# Patient Record
Sex: FEMALE | Race: WHITE | NOT HISPANIC OR LATINO | Employment: FULL TIME | URBAN - METROPOLITAN AREA
[De-identification: names, ages, dates, MRNs, and addresses within clinical notes are randomized per-mention and may not be internally consistent; named-entity substitution may affect disease eponyms.]

---

## 2021-04-03 ENCOUNTER — APPOINTMENT (EMERGENCY)
Dept: CT IMAGING | Facility: HOSPITAL | Age: 56
DRG: 563 | End: 2021-04-03
Payer: COMMERCIAL

## 2021-04-03 ENCOUNTER — HOSPITAL ENCOUNTER (INPATIENT)
Facility: HOSPITAL | Age: 56
LOS: 2 days | DRG: 563 | End: 2021-04-07
Attending: EMERGENCY MEDICINE | Admitting: INTERNAL MEDICINE
Payer: COMMERCIAL

## 2021-04-03 ENCOUNTER — APPOINTMENT (EMERGENCY)
Dept: RADIOLOGY | Facility: HOSPITAL | Age: 56
DRG: 563 | End: 2021-04-03
Payer: COMMERCIAL

## 2021-04-03 DIAGNOSIS — S00.81XA FOREHEAD ABRASION: Primary | ICD-10-CM

## 2021-04-03 DIAGNOSIS — S06.9X9A HEAD INJURY WITH LOSS OF CONSCIOUSNESS (HCC): ICD-10-CM

## 2021-04-03 DIAGNOSIS — S63.502A WRIST SPRAIN, LEFT, INITIAL ENCOUNTER: ICD-10-CM

## 2021-04-03 DIAGNOSIS — S80.211A KNEE ABRASION, RIGHT, INITIAL ENCOUNTER: ICD-10-CM

## 2021-04-03 DIAGNOSIS — R26.2 AMBULATORY DYSFUNCTION: ICD-10-CM

## 2021-04-03 DIAGNOSIS — S82.141A CLOSED FRACTURE OF RIGHT TIBIAL PLATEAU, INITIAL ENCOUNTER: ICD-10-CM

## 2021-04-03 DIAGNOSIS — M25.561 RIGHT KNEE PAIN: ICD-10-CM

## 2021-04-03 PROBLEM — S63.509A WRIST SPRAIN: Status: ACTIVE | Noted: 2021-04-03

## 2021-04-03 PROBLEM — K21.9 GERD (GASTROESOPHAGEAL REFLUX DISEASE): Status: ACTIVE | Noted: 2021-04-03

## 2021-04-03 PROBLEM — E03.9 HYPOTHYROIDISM: Status: ACTIVE | Noted: 2021-04-03

## 2021-04-03 PROBLEM — S82.143A TIBIAL PLATEAU FRACTURE: Status: ACTIVE | Noted: 2021-04-03

## 2021-04-03 PROBLEM — E66.9 OBESITY: Status: ACTIVE | Noted: 2021-04-03

## 2021-04-03 LAB
ANION GAP SERPL CALCULATED.3IONS-SCNC: 10 MMOL/L (ref 4–13)
BASOPHILS # BLD AUTO: 0.04 THOUSANDS/ΜL (ref 0–0.1)
BASOPHILS NFR BLD AUTO: 1 % (ref 0–1)
BUN SERPL-MCNC: 14 MG/DL (ref 5–25)
CALCIUM SERPL-MCNC: 9.8 MG/DL (ref 8.3–10.1)
CHLORIDE SERPL-SCNC: 105 MMOL/L (ref 100–108)
CO2 SERPL-SCNC: 26 MMOL/L (ref 21–32)
CREAT SERPL-MCNC: 0.74 MG/DL (ref 0.6–1.3)
EOSINOPHIL # BLD AUTO: 0.03 THOUSAND/ΜL (ref 0–0.61)
EOSINOPHIL NFR BLD AUTO: 0 % (ref 0–6)
ERYTHROCYTE [DISTWIDTH] IN BLOOD BY AUTOMATED COUNT: 13 % (ref 11.6–15.1)
GFR SERPL CREATININE-BSD FRML MDRD: 91 ML/MIN/1.73SQ M
GLUCOSE P FAST SERPL-MCNC: 112 MG/DL (ref 65–99)
GLUCOSE SERPL-MCNC: 112 MG/DL (ref 65–140)
HCT VFR BLD AUTO: 37.8 % (ref 34.8–46.1)
HGB BLD-MCNC: 12.5 G/DL (ref 11.5–15.4)
IMM GRANULOCYTES # BLD AUTO: 0.03 THOUSAND/UL (ref 0–0.2)
IMM GRANULOCYTES NFR BLD AUTO: 0 % (ref 0–2)
LYMPHOCYTES # BLD AUTO: 1.71 THOUSANDS/ΜL (ref 0.6–4.47)
LYMPHOCYTES NFR BLD AUTO: 22 % (ref 14–44)
MCH RBC QN AUTO: 26.3 PG (ref 26.8–34.3)
MCHC RBC AUTO-ENTMCNC: 33.1 G/DL (ref 31.4–37.4)
MCV RBC AUTO: 79 FL (ref 82–98)
MONOCYTES # BLD AUTO: 0.56 THOUSAND/ΜL (ref 0.17–1.22)
MONOCYTES NFR BLD AUTO: 7 % (ref 4–12)
NEUTROPHILS # BLD AUTO: 5.39 THOUSANDS/ΜL (ref 1.85–7.62)
NEUTS SEG NFR BLD AUTO: 70 % (ref 43–75)
NRBC BLD AUTO-RTO: 0 /100 WBCS
PLATELET # BLD AUTO: 165 THOUSANDS/UL (ref 149–390)
PMV BLD AUTO: 11.9 FL (ref 8.9–12.7)
POTASSIUM SERPL-SCNC: 4 MMOL/L (ref 3.5–5.3)
RBC # BLD AUTO: 4.76 MILLION/UL (ref 3.81–5.12)
SODIUM SERPL-SCNC: 141 MMOL/L (ref 136–145)
WBC # BLD AUTO: 7.76 THOUSAND/UL (ref 4.31–10.16)

## 2021-04-03 PROCEDURE — 73700 CT LOWER EXTREMITY W/O DYE: CPT

## 2021-04-03 PROCEDURE — 73564 X-RAY EXAM KNEE 4 OR MORE: CPT

## 2021-04-03 PROCEDURE — 99285 EMERGENCY DEPT VISIT HI MDM: CPT

## 2021-04-03 PROCEDURE — 80048 BASIC METABOLIC PNL TOTAL CA: CPT | Performed by: EMERGENCY MEDICINE

## 2021-04-03 PROCEDURE — 99284 EMERGENCY DEPT VISIT MOD MDM: CPT | Performed by: EMERGENCY MEDICINE

## 2021-04-03 PROCEDURE — 73110 X-RAY EXAM OF WRIST: CPT

## 2021-04-03 PROCEDURE — 85025 COMPLETE CBC W/AUTO DIFF WBC: CPT | Performed by: EMERGENCY MEDICINE

## 2021-04-03 PROCEDURE — 70450 CT HEAD/BRAIN W/O DYE: CPT

## 2021-04-03 PROCEDURE — 99220 PR INITIAL OBSERVATION CARE/DAY 70 MINUTES: CPT | Performed by: INTERNAL MEDICINE

## 2021-04-03 PROCEDURE — 36415 COLL VENOUS BLD VENIPUNCTURE: CPT | Performed by: EMERGENCY MEDICINE

## 2021-04-03 RX ORDER — MAGNESIUM HYDROXIDE/ALUMINUM HYDROXICE/SIMETHICONE 120; 1200; 1200 MG/30ML; MG/30ML; MG/30ML
30 SUSPENSION ORAL EVERY 6 HOURS PRN
Status: DISCONTINUED | OUTPATIENT
Start: 2021-04-03 | End: 2021-04-07 | Stop reason: HOSPADM

## 2021-04-03 RX ORDER — ACETAMINOPHEN 325 MG/1
650 TABLET ORAL EVERY 6 HOURS PRN
Status: DISCONTINUED | OUTPATIENT
Start: 2021-04-03 | End: 2021-04-07 | Stop reason: HOSPADM

## 2021-04-03 RX ORDER — DOCUSATE SODIUM 100 MG/1
100 CAPSULE, LIQUID FILLED ORAL 2 TIMES DAILY
Status: DISCONTINUED | OUTPATIENT
Start: 2021-04-03 | End: 2021-04-07 | Stop reason: HOSPADM

## 2021-04-03 RX ORDER — PANTOPRAZOLE SODIUM 20 MG/1
20 TABLET, DELAYED RELEASE ORAL
Status: DISCONTINUED | OUTPATIENT
Start: 2021-04-04 | End: 2021-04-07 | Stop reason: HOSPADM

## 2021-04-03 RX ORDER — SIMETHICONE 80 MG
80 TABLET,CHEWABLE ORAL 4 TIMES DAILY PRN
Status: DISCONTINUED | OUTPATIENT
Start: 2021-04-03 | End: 2021-04-07 | Stop reason: HOSPADM

## 2021-04-03 RX ORDER — NAPROXEN 500 MG/1
500 TABLET ORAL 2 TIMES DAILY WITH MEALS
Qty: 14 TABLET | Refills: 0 | Status: SHIPPED | OUTPATIENT
Start: 2021-04-03 | End: 2021-04-17 | Stop reason: HOSPADM

## 2021-04-03 RX ORDER — ONDANSETRON 2 MG/ML
4 INJECTION INTRAMUSCULAR; INTRAVENOUS EVERY 6 HOURS PRN
Status: DISCONTINUED | OUTPATIENT
Start: 2021-04-03 | End: 2021-04-07 | Stop reason: HOSPADM

## 2021-04-03 RX ORDER — CALCIUM CARBONATE 200(500)MG
1000 TABLET,CHEWABLE ORAL DAILY PRN
Status: DISCONTINUED | OUTPATIENT
Start: 2021-04-03 | End: 2021-04-07 | Stop reason: HOSPADM

## 2021-04-03 RX ORDER — ACETAMINOPHEN 325 MG/1
650 TABLET ORAL ONCE
Status: COMPLETED | OUTPATIENT
Start: 2021-04-03 | End: 2021-04-03

## 2021-04-03 RX ORDER — OXYCODONE HYDROCHLORIDE 5 MG/1
5 TABLET ORAL EVERY 4 HOURS PRN
Status: DISCONTINUED | OUTPATIENT
Start: 2021-04-03 | End: 2021-04-07 | Stop reason: HOSPADM

## 2021-04-03 RX ORDER — CYCLOBENZAPRINE HCL 10 MG
5 TABLET ORAL 3 TIMES DAILY PRN
Status: DISCONTINUED | OUTPATIENT
Start: 2021-04-03 | End: 2021-04-07 | Stop reason: HOSPADM

## 2021-04-03 RX ORDER — HYDROMORPHONE HCL/PF 1 MG/ML
0.2 SYRINGE (ML) INJECTION EVERY 4 HOURS PRN
Status: DISCONTINUED | OUTPATIENT
Start: 2021-04-03 | End: 2021-04-06

## 2021-04-03 RX ORDER — OXYCODONE HYDROCHLORIDE 5 MG/1
2.5 TABLET ORAL EVERY 4 HOURS PRN
Status: DISCONTINUED | OUTPATIENT
Start: 2021-04-03 | End: 2021-04-07 | Stop reason: HOSPADM

## 2021-04-03 RX ORDER — OMEPRAZOLE 20 MG/1
20 CAPSULE, DELAYED RELEASE ORAL DAILY
COMMUNITY
Start: 2021-01-29

## 2021-04-03 RX ADMIN — ACETAMINOPHEN 650 MG: 325 TABLET, FILM COATED ORAL at 18:08

## 2021-04-03 RX ADMIN — OXYCODONE HYDROCHLORIDE 5 MG: 5 TABLET ORAL at 22:22

## 2021-04-03 RX ADMIN — DOCUSATE SODIUM 100 MG: 100 CAPSULE, LIQUID FILLED ORAL at 22:22

## 2021-04-03 NOTE — ED CARE HANDOFF
Emergency Department Sign Out Note        Sign out and transfer of care from Dr Krystin Plasencia  See Separate Emergency Department note  The patient, Qamar Hurt, was evaluated by the previous provider for Mechanical fall, head, left wrist and right knee injury  Workup Completed:  XRs, Right Knee CT  ED Course / Workup Pending (followup):                                    ED Course as of Apr 03 2130   Sat Apr 03, 2021 2006 Patient received in sign-out from Dr Krystin Plasencia for follow of CT results, that shows Comminuted, nondisplaced tibial plateau fracture  2007 Patient evaluated at bedside, informed about the CT findings, will put knee immobilizer for right knee  Patient also complains of left wrist pain, mild swelling noted, x-rays reviewed, no clearly displaced fracture noted, however  subtle fracture of right lower radius possible, will put sugar-tong splint for left wrist/forearm  Patient is up-to-date with her tetanus, states that she got it at [facility] last year after accident  Will try ambulation with walker after knee immobilizer and wrist splint  2038 Case was discussed with Dr Nael Fraga, agree with Knee immobilizer, NWB, likely non-op; also possible fracture lower end of radius, agree with splinting; d/c from ortho standpoint, follow up in Office      2112 Patient not able get up or walk, lives by herself, we will admit for ambulatory dysfunction          Procedures  MDM    Disposition  Final diagnoses:   Forehead abrasion   Knee abrasion, right, initial encounter   Head injury with loss of consciousness ([facility] [address] )   Wrist sprain, left, initial encounter   Right knee pain   Closed fracture of right tibial plateau, initial encounter   Ambulatory dysfunction     Time reflects when diagnosis was documented in both MDM as applicable and the Disposition within this note     Time User Action Codes Description Comment    4/3/2021  5:49 PM Sylvia Torrez Add [S00 81XA] Forehead abrasion 4/3/2021  5:49 PM Sylvia Torrez Add [S80 211A] Knee abrasion, right, initial encounter     4/3/2021  6:11 PM Ibrahima Torrez 48 [Z52 1A4N] Head injury with loss of consciousness ([facility] [address] )     4/3/2021  6:51 PM Ibrahima Torrez 48 [U90 863R] Wrist sprain, left, initial encounter     4/3/2021  6:51 PM Shan, [address] Right knee pain     4/3/2021  8:50 PM Kelsie Vega Add [S07 080C] Closed fracture of right tibial plateau, initial encounter     4/3/2021  9:14 PM Kelsie Vega Add [R26 2] Ambulatory dysfunction       ED Disposition     ED Disposition Condition Date/Time Comment    Admit Stable Sat Apr 3, 2021  9:13 PM Case was discussed with Dr Aurelio Purvis and the patient's admission status was agreed to be Admission Status: observation status to the service of Dr Aurelio Purvis  Follow-up Information    None       Patient's Medications   Discharge Prescriptions    NAPROXEN (NAPROSYN) 500 MG TABLET    Take 1 tablet (500 mg total) by mouth 2 (two) times a day with meals       Start Date: 4/3/2021  End Date: --       Order Dose: 500 mg       Quantity: 14 tablet    Refills: 0     No discharge procedures on file         ED Provider  Electronically Signed by     Chris Delgadillo MD  04/03/21 1715

## 2021-04-03 NOTE — ED PROVIDER NOTES
History  Chief Complaint   Patient presents with    Head Injury w/LOC     patient with mechanical fall forward landing on face and hands  left wrist pain and forhead abrasion  patient states was out for approx a minute and once she woke up was "in a daze" patient currently awake and alert O x4     63 y o  F p/w fall  Pt states she missed a step and fell forward  Struck head  Reports LOC  Also having left wrist and right knee pain  Abrasion to forehead and right knee  Reports UTD w/ Tetanus vaccination  Denies changes in vision, CP, abd pain, N/V, focal deficits, numbness/tingling, anticoagulant use  History provided by:  Patient   used: No    Fall  Mechanism of injury: fall    Injury location:  Head/neck, shoulder/arm and leg  Head/neck injury location:  Head  Shoulder/arm injury location:  L wrist  Leg injury location:  R knee  Arrived directly from scene: yes    Fall:     Fall occurred:  Standing  Prior to arrival data:     Loss of consciousness: yes      Amnesic to event: no      Immobilization:  None  Associated symptoms: headaches and loss of consciousness    Associated symptoms: no abdominal pain, no chest pain, no nausea, no neck pain and no vomiting    Risk factors: no anticoagulation therapy        Prior to Admission Medications   Prescriptions Last Dose Informant Patient Reported? Taking? cyclobenzaprine (FLEXERIL) 5 mg tablet   No No   Sig: Take 1 tablet by mouth 3 (three) times a day as needed for muscle spasms   levothyroxine 200 mcg tablet   Yes Yes   Sig: Take 200 mcg by mouth daily   omeprazole (PriLOSEC) 20 mg delayed release capsule   Yes Yes   Sig: Take 20 mg by mouth daily      Facility-Administered Medications: None       Past Medical History:   Diagnosis Date    Disease of thyroid gland        Past Surgical History:   Procedure Laterality Date    GASTRIC BYPASS      HERNIA REPAIR         History reviewed  No pertinent family history    I have reviewed and agree with the history as documented  E-Cigarette/Vaping     E-Cigarette/Vaping Substances     Social History     Tobacco Use    Smoking status: Never Smoker    Smokeless tobacco: Never Used   Substance Use Topics    Alcohol use: No    Drug use: No       Review of Systems   Cardiovascular: Negative for chest pain  Gastrointestinal: Negative for abdominal pain, nausea and vomiting  Musculoskeletal: Negative for neck pain  Left wrist pain, right knee pain   Skin: Positive for wound (Abrasion to forehead and right knee)  Neurological: Positive for loss of consciousness and headaches  All other systems reviewed and are negative  Physical Exam  Physical Exam  Vitals signs and nursing note reviewed  Constitutional:       General: She is not in acute distress  Appearance: Normal appearance  She is well-developed  She is not ill-appearing, toxic-appearing or diaphoretic  HENT:      Head: Normocephalic and atraumatic  No raccoon eyes, Ruiz's sign, abrasion, contusion or laceration  Right Ear: Tympanic membrane and external ear normal  No laceration or drainage  No hemotympanum  Left Ear: Tympanic membrane and external ear normal  No laceration or drainage  No hemotympanum  Nose: Nose normal    Eyes:      General:         Right eye: No discharge  Left eye: No discharge  Extraocular Movements: Extraocular movements intact  Conjunctiva/sclera:      Right eye: No hemorrhage  Left eye: No hemorrhage  Pupils: Pupils are equal, round, and reactive to light  Neck:      Musculoskeletal: Full passive range of motion without pain and normal range of motion  Normal range of motion  No spinous process tenderness or muscular tenderness  Vascular: No JVD  Trachea: Trachea normal  No tracheal tenderness or tracheal deviation  Cardiovascular:      Rate and Rhythm: Normal rate and regular rhythm  Heart sounds: Normal heart sounds  No murmur   No friction rub  Pulmonary:      Effort: Pulmonary effort is normal  No accessory muscle usage, respiratory distress or retractions  Breath sounds: Normal breath sounds  No stridor  No decreased breath sounds, wheezing, rhonchi or rales  Chest:      Chest wall: No tenderness  Abdominal:      General: There is no distension  Palpations: Abdomen is soft  Abdomen is not rigid  There is no mass  Tenderness: There is no abdominal tenderness  There is no guarding or rebound  Musculoskeletal: Normal range of motion  General: No deformity  Left wrist: She exhibits tenderness  She exhibits no swelling, no crepitus and no deformity  Right knee: She exhibits no ecchymosis and no deformity  Tenderness (generalized) found  Cervical back: She exhibits no bony tenderness  Thoracic back: She exhibits no bony tenderness  Lumbar back: She exhibits no bony tenderness  Skin:     General: Skin is warm and dry  Coloration: Skin is not pale  Findings: Abrasion (Forehead, right knee) present  No bruising, ecchymosis or laceration  Neurological:      Mental Status: She is alert  GCS: GCS eye subscore is 4  GCS verbal subscore is 5  GCS motor subscore is 6  Cranial Nerves: No cranial nerve deficit  Sensory: No sensory deficit  Motor: Motor function is intact  Psychiatric:         Behavior: Behavior normal  Behavior is cooperative           Vital Signs  ED Triage Vitals   Temperature Pulse Respirations Blood Pressure SpO2   04/03/21 1811 04/03/21 1739 04/03/21 1739 04/03/21 1739 04/03/21 1739   98 5 °F (36 9 °C) 72 18 153/67 100 %      Temp Source Heart Rate Source Patient Position - Orthostatic VS BP Location FiO2 (%)   04/03/21 1811 04/03/21 1739 -- -- --   Oral Monitor         Pain Score       04/03/21 1808       Worst Possible Pain           Vitals:    04/03/21 1739   BP: 153/67   Pulse: 72         Visual Acuity  Visual Acuity      Most Recent Value R Pupil Size (mm)  3          ED Medications  Medications   acetaminophen (TYLENOL) tablet 650 mg (650 mg Oral Given 4/3/21 1808)       Diagnostic Studies  Results Reviewed     None                 CT head without contrast   Final Result by Khris Johns MD (04/03 1823)      No acute intracranial abnormality  Workstation performed: [id number]         XR knee 4+ vw right injury    (Results Pending)   XR wrist 3+ views LEFT    (Results Pending)   CT lower extremity wo contrast right    (Results Pending)              Procedures  Procedures         ED Course  ED Course as of Apr 03 1856   Sat Apr 03, 2021 1808 Pt given Tylenol  1850 Questionable tibial plateau fx vs nutrient channel on knee xray  Will order CT scan to r/o fx  Xray wrist without obvious fx    Pt updated on CT head and xray wrist                                               MDM  Number of Diagnoses or Management Options     Amount and/or Complexity of Data Reviewed  Tests in the radiology section of CPT®: ordered and reviewed        Disposition  Final diagnoses:   Forehead abrasion   Knee abrasion, right, initial encounter   Head injury with loss of consciousness ([address] )   Wrist sprain, left, initial encounter   Right knee pain     Time reflects when diagnosis was documented in both MDM as applicable and the Disposition within this note     Time User Action Codes Description Comment    4/3/2021  5:49 PM Shan, [address] Forehead abrasion     4/3/2021  5:49 PM Shan, [address] Knee abrasion, right, initial encounter     4/3/2021  6:11 PM Mamadou Maxwell [L90 7Y2W] Head injury with loss of consciousness ([address] )     4/3/2021  6:51 PM Bri Remerton Wrist sprain, left, initial encounter     4/3/2021  6:51 PM Shan, [address] Right knee pain       ED Disposition     None      Follow-up Information    None         Patient's Medications   Discharge Prescriptions NAPROXEN (NAPROSYN) 500 MG TABLET    Take 1 tablet (500 mg total) by mouth 2 (two) times a day with meals       Start Date: 4/3/2021  End Date: --       Order Dose: 500 mg       Quantity: 14 tablet    Refills: 0     No discharge procedures on file      PDMP Review     None          ED Provider  Electronically Signed by           Cary Leary 24, DO  04/03/21 7558

## 2021-04-04 LAB
ANION GAP SERPL CALCULATED.3IONS-SCNC: 8 MMOL/L (ref 4–13)
BUN SERPL-MCNC: 11 MG/DL (ref 5–25)
CALCIUM SERPL-MCNC: 9 MG/DL (ref 8.3–10.1)
CHLORIDE SERPL-SCNC: 107 MMOL/L (ref 100–108)
CO2 SERPL-SCNC: 27 MMOL/L (ref 21–32)
CREAT SERPL-MCNC: 0.72 MG/DL (ref 0.6–1.3)
ERYTHROCYTE [DISTWIDTH] IN BLOOD BY AUTOMATED COUNT: 13.1 % (ref 11.6–15.1)
GFR SERPL CREATININE-BSD FRML MDRD: 94 ML/MIN/1.73SQ M
GLUCOSE SERPL-MCNC: 113 MG/DL (ref 65–140)
HCT VFR BLD AUTO: 33.9 % (ref 34.8–46.1)
HGB BLD-MCNC: 10.9 G/DL (ref 11.5–15.4)
MCH RBC QN AUTO: 25.8 PG (ref 26.8–34.3)
MCHC RBC AUTO-ENTMCNC: 32.2 G/DL (ref 31.4–37.4)
MCV RBC AUTO: 80 FL (ref 82–98)
PLATELET # BLD AUTO: 129 THOUSANDS/UL (ref 149–390)
PMV BLD AUTO: 11.3 FL (ref 8.9–12.7)
POTASSIUM SERPL-SCNC: 3.9 MMOL/L (ref 3.5–5.3)
RBC # BLD AUTO: 4.22 MILLION/UL (ref 3.81–5.12)
SODIUM SERPL-SCNC: 142 MMOL/L (ref 136–145)
WBC # BLD AUTO: 5.03 THOUSAND/UL (ref 4.31–10.16)

## 2021-04-04 PROCEDURE — 99244 OFF/OP CNSLTJ NEW/EST MOD 40: CPT | Performed by: PHYSICIAN ASSISTANT

## 2021-04-04 PROCEDURE — 85027 COMPLETE CBC AUTOMATED: CPT | Performed by: INTERNAL MEDICINE

## 2021-04-04 PROCEDURE — 99232 SBSQ HOSP IP/OBS MODERATE 35: CPT | Performed by: INTERNAL MEDICINE

## 2021-04-04 PROCEDURE — 80048 BASIC METABOLIC PNL TOTAL CA: CPT | Performed by: INTERNAL MEDICINE

## 2021-04-04 RX ADMIN — OXYCODONE HYDROCHLORIDE 5 MG: 5 TABLET ORAL at 05:14

## 2021-04-04 RX ADMIN — PANTOPRAZOLE SODIUM 20 MG: 20 TABLET, DELAYED RELEASE ORAL at 06:05

## 2021-04-04 RX ADMIN — ENOXAPARIN SODIUM 40 MG: 40 INJECTION SUBCUTANEOUS at 09:01

## 2021-04-04 RX ADMIN — OXYCODONE HYDROCHLORIDE 5 MG: 5 TABLET ORAL at 17:11

## 2021-04-04 RX ADMIN — LEVOTHYROXINE SODIUM 175 MCG: 100 TABLET ORAL at 06:05

## 2021-04-04 RX ADMIN — HYDROMORPHONE HYDROCHLORIDE 0.2 MG: 1 INJECTION, SOLUTION INTRAMUSCULAR; INTRAVENOUS; SUBCUTANEOUS at 21:08

## 2021-04-04 NOTE — OCCUPATIONAL THERAPY NOTE
Occupational Therapy Cancellation Note        Patient Name: Kulwant Fulton  OBZFU'C Date: 4/4/2021      OT consult received  Pt w/ R tibial plateau fracture after a fall, will await orthopedic consult prior to completing OT evaluation      Gary Tan MS, OTR/L

## 2021-04-04 NOTE — CONSULTS
Orthopedics   Ludin Marlena 63 y o  female MRN: [id number]  Unit/Bed#: E2 -01      Chief Complaint:   right knee pain, left wrist pain    HPI:   63 y o  female status post fall complaining of right knee pain and left wrist pain  Patient states she tripped and fell forward onto her right knee and left wrist and bumped her head on the curb  She currently notes severe right knee pain as well as left wrist pain  She does have a small abrasion on her forehead  Pain is made worse with motion or contact to the area and improves somewhat with rest   Patient denies any paresthesias at the knee or hand  No chest pain or shortness of breath  She does note significant difficulty ambulating  Review Of Systems:   · Skin: Normal  · Neuro: See HPI  · Musculoskeletal: See HPI  · 14 point review of systems negative except as stated above     Past Medical History:   Past Medical History:   Diagnosis Date    Disease of thyroid gland        Past Surgical History:   Past Surgical History:   Procedure Laterality Date    GASTRIC BYPASS      HERNIA REPAIR         Family History:  Family history reviewed and non-contributory  History reviewed  No pertinent family history      Social History:  Social History     Socioeconomic History    Marital status: /Civil Union     Spouse name: None    Number of children: None    Years of education: None    Highest education level: None   Occupational History    None   Social Needs    Financial resource strain: None    Food insecurity     Worry: None     Inability: None    Transportation needs     Medical: None     Non-medical: None   Tobacco Use    Smoking status: Never Smoker    Smokeless tobacco: Never Used   Substance and Sexual Activity    Alcohol use: No     Frequency: Never     Drinks per session: Patient refused     Binge frequency: Never    Drug use: No    Sexual activity: Not Currently   Lifestyle    Physical activity     Days per week: None     Minutes per session: None    Stress: None   Relationships    Social connections     Talks on phone: None     Gets together: None     Attends Mu-ism service: None     Active member of club or organization: None     Attends meetings of clubs or organizations: None     Relationship status: None    Intimate partner violence     Fear of current or ex partner: None     Emotionally abused: None     Physically abused: None     Forced sexual activity: None   Other Topics Concern    None   Social History Narrative    None       Allergies:   No Known Allergies        Labs:  0   Lab Value Date/Time    HCT 33 9 (L) 04/04/2021 0513    HCT 37 8 04/03/2021 2121    HCT 34 (L) 10/27/2016 2226    HGB 10 9 (L) 04/04/2021 0513    HGB 12 5 04/03/2021 2121    HGB 11 6 10/27/2016 2226    WBC 5 03 04/04/2021 0513    WBC 7 76 04/03/2021 2121       Meds:    Current Facility-Administered Medications:     acetaminophen (TYLENOL) tablet 650 mg, 650 mg, Oral, Q6H PRN, Barry Tim DO    aluminum-magnesium hydroxide-simethicone (MYLANTA) oral suspension 30 mL, 30 mL, Oral, Q6H PRN, Barry Tim DO    calcium carbonate (TUMS) chewable tablet 1,000 mg, 1,000 mg, Oral, Daily PRN, Barry Tim DO    cyclobenzaprine (FLEXERIL) tablet 5 mg, 5 mg, Oral, TID PRN, Pee Watkins DO    docusate sodium (COLACE) capsule 100 mg, 100 mg, Oral, BID, Barry Tim DO, 100 mg at 04/03/21 2222    enoxaparin (LOVENOX) subcutaneous injection 40 mg, 40 mg, Subcutaneous, Q24H [address], Barry Tim DO, 40 mg at 04/04/21 0901    HYDROmorphone (DILAUDID) injection 0 2 mg, 0 2 mg, Intravenous, Q4H PRN, Barry Tim DO    levothyroxine tablet 175 mcg, 175 mcg, Oral, Early Morning, Barry Tim DO, 175 mcg at 04/04/21 0605    ondansetron (ZOFRAN) injection 4 mg, 4 mg, Intravenous, Q6H PRN, Barry D Tim, DO    oxyCODONE (ROXICODONE) IR tablet 2 5 mg, 2 5 mg, Oral, Q4H PRN, Barry Tim DO    oxyCODONE (ROXICODONE) IR tablet 5 mg, 5 mg, Oral, Q4H PRN, Pee Watkins DO, 5 mg at 04/04/21 0514    pantoprazole (PROTONIX) EC tablet 20 mg, 20 mg, Oral, Early Morning, Barry Tim DO, 20 mg at 04/04/21 0605    simethicone (MYLICON) chewable tablet 80 mg, 80 mg, Oral, 4x Daily PRN, Barry Tim DO    Blood Culture:   No results found for: BLOODCX    Wound Culture:   No results found for: WOUNDCULT    Ins and Outs:  No intake/output data recorded  Physical Exam:   /69 (BP Location: Right arm)   Pulse 90   Temp 98 9 °F (37 2 °C) (Temporal)   Resp 18   Ht 5' 5" (1 651 m)   Wt 99 7 kg (219 lb 12 8 oz)   SpO2 97%   BMI 36 58 kg/m²   Gen: Alert and oriented to person, place, time  HEENT: EOMI, eyes clear, moist mucus membranes, hearing intact  Respiratory: Bilateral chest rise  No audible wheezing found  Cardiovascular: Regular Rate and Rhythm  Abdomen: soft nontender/nondistended  Musculoskeletal: right lower extremity  · Skin with some superficial healing abrasions, no erythema or signs of infection   · Knee immobilizer in place  · Tender to palpation over entire knee and proximal tibia  · Painful knee range of motion  · Unable to tolerate varus/valgus stress secondary to pain  · Sensation intact dp/sp/tib/abhishek/saph  · Intact ankle dorsi/plantar flexion, EHL/FHL  · Leg soft and compressible, no pain with passive stretch  · Palpable PT and DP pulses    Left upper extremity:       Radiology:   I personally reviewed the films  X-rays right knee shows nondisplaced tibial plateau fracture  CT right knee shows same with fracture lines intersecting at the medial and lateral plateaus  No significant articular incongruity    Xrays of the left wrist with small cortical abnormality noted at the dorsal rim of the distal radius, no additional osseous abnormalities noted   No official read as of yet      _*_*_*_*_*_*_*_*_*_*_*_*_*_*_*_*_*_*_*_*_*_*_*_*_*_*_*_*_*_*_*_*_*_*_*_*_*_*_*_*_*    Assessment:  63 y o  female status post fall with right non-displaced tibial plateau fracture and left wrist sprain vs occult nondisplaced fracture    Plan:   · Non weight bearing right lower extremity in knee immobilizer, NWB left wrist  · Discussed with the patient that this fracture is non-displaced and could be treated either surgically or nonoperatively with close outpatient monitoring  Will continue with non-operative treatment for now, but will discuss with Dr Ming Galeana regarding possible fixation  NPO at midnight   · For now maintain KI, if decision to proceed nonop advise transition to TROM locked in extension  NWB either way  · Analgesics for pain  · PT/OT for ambulation, recommend platform walker due to wrist injury   · Will await official read for wrist xray, possibly small dorsal fracture but overall alignment is anatomic  Maintain splint and NWB   · Body mass index is 36 58 kg/m²  moderately obese  Recommend behavior modifications, nutrition and physical activity    · Dispo: Ortho will follow      Edu Loja PA-C

## 2021-04-04 NOTE — H&P
[facility] 1965[facility] 63 [facility]  [facility] MRN: [id number]  Unit/Bed#: ED 17 Encounter: [id number]  Primary Care Provider: Charles Temple MD   Date and time admitted to hospital: 4/3/2021  5:31 PM    Assessment and Plan  Wrist sprain  Assessment & Plan  Right wrist sprain, currently splinted    GERD (gastroesophageal reflux disease)  Assessment & Plan  Resume home medication    Hypothyroidism  Assessment & Plan  Recently reduced to 175 mcg of levothyroxine - will resume    Tibial plateau fracture  Assessment & Plan  Comminuted, nondisplaced tibial plateau fracture  Formal consultation placed with the orthopedic service    * Ambulatory dysfunction  Assessment & Plan  Patient with ambulation dysfunction after fall  She fell out of her car and subsequently struck her head  There was a brief loss of consciousness  In the emergency room she is unable to ambulate due to pain  She does have a right leg tibial plateau fracture as well as a left wrist sprain  Will place formal orthopedics consultation  PT OT consult  Pain control          Code Status: Level 1 - Full Code **    VTE Prophylaxis: Enoxaparin (Lovenox)  / sequential compression device     POLST: There is no POLST form on file for this patient (pre-hospital)  Discussion with family:  Discussed with daughter at bedside    Anticipated Length of Stay:  Patient will be admitted on an Observation basis with an anticipated length of stay of  less than 2 midnights  Justification for Hospital Stay: Ambulatory dysfunction     Total Time for Visit, including Counseling / Coordination of Care: 45 minutes  Greater than 50% of this total time spent on direct patient counseling and coordination of care  Chief Complaint:     Chief Complaint   Patient presents with    Head Injury w/LOC     patient with mechanical fall forward landing on face and hands  left wrist pain and forhead abrasion   patient states was out for approx a minute and once she woke up was "in a daze" patient currently awake and alert O x4       History of Present Illness:    Claudio Espinoza is a 63 y o  female who presents with fall while getting out of her car  Patient subsequently struck her head on a curb, was a brief loss of consciousness however she was able to call for help  She was brought to the emergency room and complained of right leg pain as well as left wrist pain  She was found to have a left wrist fracture as well as a right tibial plateau fracture  At the time my examination the patient's leg is splinted, her pain is well controlled  She denies any fevers or chills, no nausea vomiting or diarrhea  She was unable to ambulate in the emergency room due to her pain  Per the emergency room staff the patient lives alone although her daughter is at bedside  Review of Systems:    A complete and comprehensive 14 point organ system review was performed and all other systems are negative other than stated above in the HPI    Past Medical and Surgical History:     Past Medical History:   Diagnosis Date    Disease of thyroid gland        Past Surgical History:   Procedure Laterality Date    GASTRIC BYPASS      HERNIA REPAIR         Meds/Allergies:    Prior to Admission medications    Medication Sig Start Date End Date Taking? Authorizing Provider   levothyroxine 200 mcg tablet Take 200 mcg by mouth daily   Yes Historical Provider, MD   omeprazole (PriLOSEC) 20 mg delayed release capsule Take 20 mg by mouth daily 1/29/21  Yes Historical Provider, MD   cyclobenzaprine (FLEXERIL) 5 mg tablet Take 1 tablet by mouth 3 (three) times a day as needed for muscle spasms 10/27/16   Nay Sierra MD   naproxen (NAPROSYN) 500 mg tablet Take 1 tablet (500 mg total) by mouth 2 (two) times a day with meals 4/3/21   Sylvia Torrez, DO     I have reviewed home medications with patient personally      Allergies: No Known Allergies    Social History:     Marital Status: /Civil Union   Occupation:  Unknown    Substance Use History:   Social History     Substance and Sexual Activity   Alcohol Use No     Social History     Tobacco Use   Smoking Status Never Smoker   Smokeless Tobacco Never Used     Social History     Substance and Sexual Activity   Drug Use No       Family History:    History reviewed  No pertinent family history  Physical Exam:     Vitals:   Blood Pressure: 153/67 (04/03/21 1739)  Pulse: 72 (04/03/21 1739)  Temperature: 98 5 °F (36 9 °C) (04/03/21 1811)  Temp Source: Oral (04/03/21 1811)  Respirations: 18 (04/03/21 1739)  SpO2: 100 % (04/03/21 1739)      General: well appearing, no acute distress  HEENT: atraumatic, PERRLA, moist mucosa, normal pharynx, normal tonsils and adenoids, normal tongue, no fluid in sinuses  Neck: Trachea midline, no carotid bruit, no masses  Respiratory: normal chest wall expansion, CTA B, no r/r/w, no rubs  Cardiovascular: RRR, no m/r/g, Normal S1 and S2  Abdomen: Soft, non-tender, non-distended, normal bowel sounds in all quadrants, no hepatosplenomegaly, no tympany  Rectal: deferred  Musculoskeletal:  Right leg splinted, left wrist splinted  Integumentary: warm, dry, and pink, with no rash, purpura, or petechia  Heme/Lymph: no lymphadenopathy, no bruises  Neurological: Cranial Nerves II-XII grossly intact  Psychiatric: cooperative with normal mood, affect, and cognition    Additional Data:     Lab Results: I have personally reviewed pertinent reports  Results from last 7 days   Lab Units 04/03/21  2121   WBC Thousand/uL 7 76   HEMOGLOBIN g/dL 12 5   HEMATOCRIT % 37 8   PLATELETS Thousands/uL 165   NEUTROS PCT % 70   LYMPHS PCT % 22   MONOS PCT % 7   EOS PCT % 0                           Imaging: I have personally reviewed pertinent reports  CT lower extremity wo contrast right   Final Result by Geronimo Davis DO (04/03 1956)      Comminuted, nondisplaced tibial plateau fracture           The study was marked in [facility] 3 for immediate notification  Workstation performed: [id number]         CT head without contrast   Final Result by Kelly Santos MD (04/03 1823)      No acute intracranial abnormality  Workstation performed: [id number]         XR knee 4+ vw right injury    (Results Pending)   XR wrist 3+ views LEFT    (Results Pending)       EKG, Pathology, and Other Studies Reviewed on Admission:   · Reviewed previous imaging study    Allscripts / Epic Records Reviewed: Yes     ** Please Note: This note was completed in part utilizing M-Modal Fluency Direct Software  Grammatical errors, random word insertions, spelling mistakes, and incomplete sentences may be an occasional consequence of this system secondary to software limitations, ambient noise, and hardware issues  If you have any questions or concerns about the content, text, or information contained within the body of this dictation, please contact the provider for clarification  **

## 2021-04-04 NOTE — ASSESSMENT & PLAN NOTE
Patient with ambulation dysfunction after fall  She fell out of her car and subsequently struck her head  There was a brief loss of consciousness    In the emergency room she is unable to ambulate due to pain  She does have a right leg tibial plateau fracture as well as a left wrist sprain  Will place formal orthopedics consultation  PT OT consult  Pain control

## 2021-04-04 NOTE — PROGRESS NOTES
Progress Note - Elva Harry 63 y o  female MRN: [id number]    Unit/Bed#: E2 -01 Encounter: [id number]    Assessment/Plan:    Tibial plateau fracture   await orthopedic consult and evaluation narcotic pain control with knee immobilizer    Wrist sprain    Splinted, await orthopedic evaluation    Hypothyroid    continue Synthroid 175 micrograms daily    Ambulatory dysfunction  PT to evaluate    GERD     continue PPI for acid    Subjective:   Complains of severe pain in the right knee, discomfort of left wrist denies chest pain shortness of breath nausea vomiting no fevers chills appetite good      Objective:     Vitals: Blood pressure 122/69, pulse 90, temperature 98 9 °F (37 2 °C), temperature source Temporal, resp  rate 18, height 5' 5" (1 651 m), weight 99 7 kg (219 lb 12 8 oz), SpO2 97 %  ,Body mass index is 36 58 kg/m²        Results from last 7 days   Lab Units 04/04/21  0513   WBC Thousand/uL 5 03   HEMOGLOBIN g/dL 10 9*   HEMATOCRIT % 33 9*   PLATELETS Thousands/uL 129*     Results from last 7 days   Lab Units 04/04/21  0513   POTASSIUM mmol/L 3 9   CHLORIDE mmol/L 107   CO2 mmol/L 27   BUN mg/dL 11   CREATININE mg/dL 0 72   CALCIUM mg/dL 9 0       Scheduled Meds:  Current Facility-Administered Medications   Medication Dose Route Frequency Provider Last Rate    acetaminophen  650 mg Oral Q6H PRN Barry Tim, DO      aluminum-magnesium hydroxide-simethicone  30 mL Oral Q6H PRN Ash Bowling, DO      calcium carbonate  1,000 mg Oral Daily PRN Ash Bowling, DO      cyclobenzaprine  5 mg Oral TID PRN Ash Bowling, DO      docusate sodium  100 mg Oral BID Barry Tim, DO      enoxaparin  40 mg Subcutaneous Q24H [facility] Barry Calhoun, DO      HYDROmorphone  0 2 mg Intravenous Q4H PRN Ash Bowling, DO      levothyroxine  175 mcg Oral Early Morning Barry Tim, DO      ondansetron  4 mg Intravenous Q6H PRN Ash Bowling, DO      oxyCODONE  2 5 mg Oral Q4H PRN Ash Bowling, DO      oxyCODONE  5 mg Oral Q4H PRN Kartik Sun, DO      pantoprazole  20 mg Oral Early Morning Barry Tim, DO      simethicone  80 mg Oral 4x Daily PRN Barry Tim, DO         Continuous Infusions:         Physical exam:  General appearance:  Alert no distress interaction appropriate  Head/Eyes:  Nonicteric PERRLOEMI  Neck:  Supple  Lungs: CTA bilateral no wheezing rhonchi or rales  Heart: normal S1 S2 regular  Abdomen: Soft nontender with bowel sounds  Extremities: no edema right knee swollen mobilizer, left wrist Ace wrapped  Skin: no rash    Invasive Devices     Peripheral Intravenous Line            Peripheral IV 04/03/21 Right Forearm less than 1 day                  VTE Pharmacologic Prophylaxis:  Lovenox      Counseling / Coordination of Care  Total floor / unit time spent today 30  minutes  Greater than 50% of total time was spent with the patient and / or family counseling and / or coordination of care    A description of the counseling / coordination of care:

## 2021-04-04 NOTE — UTILIZATION REVIEW
Initial Clinical Review    Admission: Date/Time/Statement: e  Admission Orders (From admission, onward)     Ordered        04/03/21 2115  Place in Observation  Once         04/03/21 2116  Place in Observation  Once                 romaine  Orders Placed This Encounter   Procedures    Place in Observation     Standing Status:   Standing     Number of Occurrences:   1     Order Specific Question:   Level of Care     Answer:   Med Surg [16]    Place in Observation     Standing Status:   Standing     Number of Occurrences:   1     Order Specific Question:   Level of Care     Answer:   Med Surg [16]     Order Specific Question:   Bed Type     Answer:   Of[place] [4]     ED Arrival Information     Expected Arrival Acuity Means of Arrival Escorted By Service Admission Type    - 4/3/2021 17:31 Urgent Ambulance [facility] ([address]) [address] Urgent    Arrival Complaint    Fall        Chief Complaint   Patient presents with    Head Injury w/LOC     patient with mechanical fall forward landing on face and hands  left wrist pain and forhead abrasion  patient states was out for approx a minute and once she woke up was "in a daze" patient currently awake and alert O x4     Assessment/Plan:  63 y o  female who presented to ED via EMS after falling while getting out of her car  Patient subsequently struck her head on a curb, was a brief loss of consciousness however she was able to call for help  She was brought to the emergency room and complained of right leg pain as well as left wrist pain  She was found to have a left wrist fracture as well as a right tibial plateau fracture   Patient unable to walk due to pain   PMHx GERD,hypothyroidism Plan PT/OT pain control     04-04-21   await orthopedic consult and evaluation narcotic pain control with knee immobilizer    ED Triage Vitals   Temperature Pulse Respirations Blood Pressure SpO2   04/03/21 1811 04/03/21 1739 04/03/21 1739 04/03/21 1739 04/03/21 1739   98 5 °F (36 9 °C) 72 18 153/67 100 %      Temp Source Heart Rate Source Patient Position - Orthostatic VS BP Location FiO2 (%)   04/03/21 1811 04/03/21 1739 04/03/21 2202 04/03/21 2202 --   Oral Monitor Lying Right arm       Pain Score       04/03/21 1808       Worst Possible Pain       Pl  Additional Vital Signs:   Date/Time  Temp  Pulse  Resp  BP  MAP (mmHg)  SpO2  O2 Device  Patient Position - Orthostatic VS   04/04/21 0700  98 9 °F (37 2 °C)  90  18  122/69  --  97 %  None (Room air)  Lying   04/03/21 2202  98 6 °F (37 °C)  68  18  125/60  80  100 %  None (Room air)  Lying   04/03/21 1811  98 5 °F (36 9 °C)  --  --  --  --  --  --         Pertinent Labs/Diagnostic Test Results:       Results from last 7 days   Lab Units 04/04/21  0513 04/03/21 2121   WBC Thousand/uL 5 03 7 76   HEMOGLOBIN g/dL 10 9* 12 5   HEMATOCRIT % 33 9* 37 8   PLATELETS Thousands/uL 129* 165   NEUTROS ABS Thousands/µL  --  5 39         Results from last 7 days   Lab Units 04/04/21  0513 04/03/21  2121   SODIUM mmol/L 142 141   POTASSIUM mmol/L 3 9 4 0   CHLORIDE mmol/L 107 105   CO2 mmol/L 27 26   ANION GAP mmol/L 8 10   BUN mg/dL 11 14   CREATININE mg/dL 0 72 0 74   EGFR ml/min/1 73sq m 94 91   CALCIUM mg/dL 9 0 9 8             Results from last 7 days   Lab Units 04/04/21  0513 04/03/21 2121   GLUCOSE RANDOM mg/dL 113 112     CT lower extremity  04-03-21  Comminuted, nondisplaced tibial plateau fracture     CT head 04-03-21  WNL          ED Treatment:   Medication Administration from 04/03/2021 1731 to 04/03/2021 2200       Date/Time Order Dose Route Action     04/03/2021 1808 acetaminophen (TYLL) tablet 650 mg 650 mg Oral Given        Past Medical History:   Diagnosis Date    Disease of thyroid gland      Present on Admission:  **None**      Admitting Diagnosis: Head injury [S09 90XA]  Right knee pain [M25 561]  Forehead abrasion [S00 81XA]  Knee abrasion, right, initial encounter [S80 211A]  Wrist sprain, left, initial encounter [S63 502A]  Ambulatory dysfunction [R26 2]  Closed fracture of right tibial plateau, initial encounter [S82 141A]  Head injury with loss of consciousness ([facility] [address] ) [I40 8Q5S]  Age/Sex: 63 y o  female  Admission Orders:  Scheduled Medications:  docusate sodium, 100 mg, Oral, BID  enoxaparin, 40 mg, Subcutaneous, Q24H DELROY  levothyroxine, 175 mcg, Oral, Early Morning  pantoprazole, 20 mg, Oral, Early Morning      Continuous IV Infusions:     PRN Meds:  acetaminophen, 650 mg, Oral, Q6H PRN  aluminum-magnesium hydroxide-simethicone, 30 mL, Oral, Q6H PRN  calcium carbonate, 1,000 mg, Oral, Daily PRN  cyclobenzaprine, 5 mg, Oral, TID PRN  HYDROmorphone, 0 2 mg, Intravenous, Q4H PRN  ondansetron, 4 mg, Intravenous, Q6H PRN  oxyCODONE, 2 5 mg, Oral, Q4H PRN  oxyCODONE, 5 mg, Oral, Q4H PRN  x2  simethicone, 80 mg, Oral, 4x Daily PRN        IP CONSULT TO ORTHOPEDIC SURGERY   PT/OT  Knee immobilizer  Left wrist slpint    Network Utilization Review Department  ATTENTION: Please call with any questions or concerns to [phone] and carefully listen to the prompts so that you are directed to the right person  All voicemails are confidential   Vasile Vera all requests for admission clinical reviews, approved or denied determinations and any other requests to dedicated fax number below belonging to the campus where the patient is receiving treatment   List of dedicated fax numbers for the Facilities:  [address] DENIALS (Administrative/Medical Necessity) [phone]   [address] (Maternity/NICU/Pediatrics) [phone]   [address] (  Eloy Magdaleno "Tatiana" 103) [address] [facility] [phone] 187 [facility] Sherwin Rodriguez 1481 [address] [place] [address] [facility] [place]51 [phone]

## 2021-04-04 NOTE — PLAN OF CARE
Problem: Prexisting or High Potential for Compromised Skin Integrity  Goal: Skin integrity is maintained or improved  Description: INTERVENTIONS:  - Identify patients at risk for skin breakdown  - Assess and monitor skin integrity  - Assess and monitor nutrition and hydration status  - Monitor labs   - Assess for incontinence   - Turn and reposition patient  - Assist with mobility/ambulation  - Relieve pressure over bony prominences  - Avoid friction and shearing  - Provide appropriate hygiene as needed including keeping skin clean and dry  - Evaluate need for skin moisturizer/barrier cream  - Collaborate with interdisciplinary team   - Patient/family teaching  - Consider wound care consult   Outcome: Progressing     Problem: DISCHARGE PLANNING - CARE MANAGEMENT  Goal: Discharge to post-acute care or home with appropriate resources  Description: INTERVENTIONS:  - Conduct assessment to determine patient/family and health care team treatment goals, and need for post-acute services based on payer coverage, community resources, and patient preferences, and barriers to discharge  - Address psychosocial, clinical, and financial barriers to discharge as identified in assessment in conjunction with the patient/family and health care team  - Arrange appropriate level of post-acute services according to patients   needs and preference and payer coverage in collaboration with the physician and health care team  - Communicate with and update the patient/family, physician, and health care team regarding progress on the discharge plan  - Arrange appropriate transportation to post-acute venues  Outcome: Progressing     Problem: NEUROSENSORY - ADULT  Goal: Achieves stable or improved neurological status  Description: INTERVENTIONS  - Monitor and report changes in neurological status  - Monitor vital signs such as temperature, blood pressure, glucose, and any other labs ordered   - Initiate measures to prevent increased The pharmacy called stating the iron script that was sent is not covered under insurance because it is over the counter  The pharmacy is requesting a script for Ferrous sulfate 324mg EC tablets to be sent instead  intracranial pressure  - Monitor for seizure activity and implement precautions if appropriate      Outcome: Progressing  Goal: Achieves maximal functionality and self care  Description: INTERVENTIONS  - Monitor swallowing and airway patency with patient fatigue and changes in neurological status  - Encourage and assist patient to increase activity and self care     - Encourage visually impaired, hearing impaired and aphasic patients to use assistive/communication devices  Outcome: Progressing     Problem: SKIN/TISSUE INTEGRITY - ADULT  Goal: Skin integrity remains intact  Description: INTERVENTIONS  - Identify patients at risk for skin breakdown  - Assess and monitor skin integrity  - Assess and monitor nutrition and hydration status  - Monitor labs (i e  albumin)  - Assess for incontinence   - Turn and reposition patient  - Assist with mobility/ambulation  - Relieve pressure over bony prominences  - Avoid friction and shearing  - Provide appropriate hygiene as needed including keeping skin clean and dry  - Evaluate need for skin moisturizer/barrier cream  - Collaborate with interdisciplinary team (i e  Nutrition, Rehabilitation, etc )   - Patient/family teaching  Outcome: Progressing  Goal: Incision(s), wounds(s) or drain site(s) healing without S/S of infection  Description: INTERVENTIONS  - Assess and document risk factors for skin impairment   - Assess and document dressing, incision, wound bed, drain sites and surrounding tissue  - Consider nutrition services referral as needed  - Oral mucous membranes remain intact  - Provide patient/ family education  Outcome: Progressing     Problem: MUSCULOSKELETAL - ADULT  Goal: Maintain or return mobility to safest level of function  Description: INTERVENTIONS:  - Assess patient's ability to carry out ADLs; assess patient's baseline for ADL function and identify physical deficits which impact ability to perform ADLs (bathing, care of mouth/teeth, toileting, grooming, dressing, etc )  - Assess/evaluate cause of self-care deficits   - Assess range of motion  - Assess patient's mobility  - Assess patient's need for assistive devices and provide as appropriate  - Encourage maximum independence but intervene and supervise when necessary  - Involve family in performance of ADLs  - Assess for home care needs following discharge   - Consider OT consult to assist with ADL evaluation and planning for discharge  - Provide patient education as appropriate  Outcome: Progressing  Goal: Maintain proper alignment of affected body part  Description: INTERVENTIONS:  - Support, maintain and protect limb and body alignment  - Provide patient/ family with appropriate education  Outcome: Progressing     Problem: PAIN - ADULT  Goal: Verbalizes/displays adequate comfort level or baseline comfort level  Description: Interventions:  - Encourage patient to monitor pain and request assistance  - Assess pain using appropriate pain scale  - Administer analgesics based on type and severity of pain and evaluate response  - Implement non-pharmacological measures as appropriate and evaluate response  - Consider cultural and social influences on pain and pain management  - Notify physician/advanced practitioner if interventions unsuccessful or patient reports new pain  Outcome: Progressing     Problem: INFECTION - ADULT  Goal: Absence or prevention of progression during hospitalization  Description: INTERVENTIONS:  - Assess and monitor for signs and symptoms of infection  - Monitor lab/diagnostic results  - Monitor all insertion sites, i e  indwelling lines, tubes, and drains  - Monitor endotracheal if appropriate and nasal secretions for changes in amount and color  - [place] appropriate cooling/warming therapies per order  - Administer medications as ordered  - Instruct and encourage patient and family to use good hand hygiene technique  - Identify and instruct in appropriate isolation precautions for identified infection/condition  Outcome: Progressing  Goal: Absence of fever/infection during neutropenic period  Description: INTERVENTIONS:  - Monitor WBC    Outcome: Progressing     Problem: SAFETY ADULT  Goal: Patient will remain free of falls  Description: INTERVENTIONS:  - Assess patient frequently for physical needs  -  Identify cognitive and physical deficits and behaviors that affect risk of falls    -  [place] fall precautions as indicated by assessment   - Educate patient/family on patient safety including physical limitations  - Instruct patient to call for assistance with activity based on assessment  - Modify environment to reduce risk of injury  - Consider OT/PT consult to assist with strengthening/mobility  Outcome: Progressing  Goal: Maintain or return to baseline ADL function  Description: INTERVENTIONS:  -  Assess patient's ability to carry out ADLs; assess patient's baseline for ADL function and identify physical deficits which impact ability to perform ADLs (bathing, care of mouth/teeth, toileting, grooming, dressing, etc )  - Assess/evaluate cause of self-care deficits   - Assess range of motion  - Assess patient's mobility; develop plan if impaired  - Assess patient's need for assistive devices and provide as appropriate  - Encourage maximum independence but intervene and supervise when necessary  - Involve family in performance of ADLs  - Assess for home care needs following discharge   - Consider OT consult to assist with ADL evaluation and planning for discharge  - Provide patient education as appropriate  Outcome: Progressing  Goal: Maintain or return mobility status to optimal level  Description: INTERVENTIONS:  - Assess patient's baseline mobility status (ambulation, transfers, stairs, etc )    - Identify cognitive and physical deficits and behaviors that affect mobility  - Identify mobility aids required to assist with transfers and/or ambulation (gait belt, sit-to-stand, lift, walker, cane, etc )  - [place] fall precautions as indicated by assessment  - Record patient progress and toleration of activity level on Mobility SBAR; progress patient to next Phase/Stage  - Instruct patient to call for assistance with activity based on assessment  - Consider rehabilitation consult to assist with strengthening/weightbearing, etc   Outcome: Progressing     Problem: DISCHARGE PLANNING  Goal: Discharge to home or other facility with appropriate resources  Description: INTERVENTIONS:  - Identify barriers to discharge w/patient and caregiver  - Arrange for needed discharge resources and transportation as appropriate  - Identify discharge learning needs (meds, wound care, etc )  - Arrange for interpretive services to assist at discharge as needed  - Refer to Case Management Department for coordinating discharge planning if the patient needs post-hospital services based on physician/advanced practitioner order or complex needs related to functional status, cognitive ability, or social support system  Outcome: Progressing     Problem: Knowledge Deficit  Goal: Patient/family/caregiver demonstrates understanding of disease process, treatment plan, medications, and discharge instructions  Description: Complete learning assessment and assess knowledge base    Interventions:  - Provide teaching at level of understanding  - Provide teaching via preferred learning methods  Outcome: Progressing

## 2021-04-04 NOTE — PHYSICAL THERAPY NOTE
PT Cancel Note      PT orders received, chart reviewed  Pt  NWB at this time for L wrist and R LE s/p fall  Imaging pending and ortho consult placed  TBD if surgical candidate vs non-operative management  PT cancel for now, continue to follow      Víctor Anderson PT

## 2021-04-04 NOTE — ASSESSMENT & PLAN NOTE
Comminuted, nondisplaced tibial plateau fracture    Formal consultation placed with the orthopedic service

## 2021-04-04 NOTE — PLAN OF CARE
Problem: Prexisting or High Potential for Compromised Skin Integrity  Goal: Skin integrity is maintained or improved  Description: INTERVENTIONS:  - Identify patients at risk for skin breakdown  - Assess and monitor skin integrity  - Assess and monitor nutrition and hydration status  - Monitor labs   - Assess for incontinence   - Turn and reposition patient  - Assist with mobility/ambulation  - Relieve pressure over bony prominences  - Avoid friction and shearing  - Provide appropriate hygiene as needed including keeping skin clean and dry  - Evaluate need for skin moisturizer/barrier cream  - Collaborate with interdisciplinary team   - Patient/family teaching  - Consider wound care consult   Outcome: Progressing     Problem: DISCHARGE PLANNING - CARE MANAGEMENT  Goal: Discharge to post-acute care or home with appropriate resources  Description: INTERVENTIONS:  - Conduct assessment to determine patient/family and health care team treatment goals, and need for post-acute services based on payer coverage, community resources, and patient preferences, and barriers to discharge  - Address psychosocial, clinical, and financial barriers to discharge as identified in assessment in conjunction with the patient/family and health care team  - Arrange appropriate level of post-acute services according to patients   needs and preference and payer coverage in collaboration with the physician and health care team  - Communicate with and update the patient/family, physician, and health care team regarding progress on the discharge plan  - Arrange appropriate transportation to post-acute venues  Outcome: Progressing     Problem: NEUROSENSORY - ADULT  Goal: Achieves stable or improved neurological status  Description: INTERVENTIONS  - Monitor and report changes in neurological status  - Monitor vital signs such as temperature, blood pressure, glucose, and any other labs ordered   - Initiate measures to prevent increased intracranial pressure  - Monitor for seizure activity and implement precautions if appropriate      Outcome: Progressing  Goal: Achieves maximal functionality and self care  Description: INTERVENTIONS  - Monitor swallowing and airway patency with patient fatigue and changes in neurological status  - Encourage and assist patient to increase activity and self care     - Encourage visually impaired, hearing impaired and aphasic patients to use assistive/communication devices  Outcome: Progressing     Problem: SKIN/TISSUE INTEGRITY - ADULT  Goal: Skin integrity remains intact  Description: INTERVENTIONS  - Identify patients at risk for skin breakdown  - Assess and monitor skin integrity  - Assess and monitor nutrition and hydration status  - Monitor labs (i e  albumin)  - Assess for incontinence   - Turn and reposition patient  - Assist with mobility/ambulation  - Relieve pressure over bony prominences  - Avoid friction and shearing  - Provide appropriate hygiene as needed including keeping skin clean and dry  - Evaluate need for skin moisturizer/barrier cream  - Collaborate with interdisciplinary team (i e  Nutrition, Rehabilitation, etc )   - Patient/family teaching  Outcome: Progressing  Goal: Incision(s), wounds(s) or drain site(s) healing without S/S of infection  Description: INTERVENTIONS  - Assess and document risk factors for skin impairment   - Assess and document dressing, incision, wound bed, drain sites and surrounding tissue  - Consider nutrition services referral as needed  - Oral mucous membranes remain intact  - Provide patient/ family education  Outcome: Progressing     Problem: MUSCULOSKELETAL - ADULT  Goal: Maintain or return mobility to safest level of function  Description: INTERVENTIONS:  - Assess patient's ability to carry out ADLs; assess patient's baseline for ADL function and identify physical deficits which impact ability to perform ADLs (bathing, care of mouth/teeth, toileting, grooming, dressing, etc )  - Assess/evaluate cause of self-care deficits   - Assess range of motion  - Assess patient's mobility  - Assess patient's need for assistive devices and provide as appropriate  - Encourage maximum independence but intervene and supervise when necessary  - Involve family in performance of ADLs  - Assess for home care needs following discharge   - Consider OT consult to assist with ADL evaluation and planning for discharge  - Provide patient education as appropriate  Outcome: Progressing  Goal: Maintain proper alignment of affected body part  Description: INTERVENTIONS:  - Support, maintain and protect limb and body alignment  - Provide patient/ family with appropriate education  Outcome: Progressing     Problem: PAIN - ADULT  Goal: Verbalizes/displays adequate comfort level or baseline comfort level  Description: Interventions:  - Encourage patient to monitor pain and request assistance  - Assess pain using appropriate pain scale  - Administer analgesics based on type and severity of pain and evaluate response  - Implement non-pharmacological measures as appropriate and evaluate response  - Consider cultural and social influences on pain and pain management  - Notify physician/advanced practitioner if interventions unsuccessful or patient reports new pain  Outcome: Progressing     Problem: INFECTION - ADULT  Goal: Absence or prevention of progression during hospitalization  Description: INTERVENTIONS:  - Assess and monitor for signs and symptoms of infection  - Monitor lab/diagnostic results  - Monitor all insertion sites, i e  indwelling lines, tubes, and drains  - Monitor endotracheal if appropriate and nasal secretions for changes in amount and color  - [place] appropriate cooling/warming therapies per order  - Administer medications as ordered  - Instruct and encourage patient and family to use good hand hygiene technique  - Identify and instruct in appropriate isolation precautions for identified infection/condition  Outcome: Progressing  Goal: Absence of fever/infection during neutropenic period  Description: INTERVENTIONS:  - Monitor WBC    Outcome: Progressing     Problem: SAFETY ADULT  Goal: Patient will remain free of falls  Description: INTERVENTIONS:  - Assess patient frequently for physical needs  -  Identify cognitive and physical deficits and behaviors that affect risk of falls    -  [place] fall precautions as indicated by assessment   - Educate patient/family on patient safety including physical limitations  - Instruct patient to call for assistance with activity based on assessment  - Modify environment to reduce risk of injury  - Consider OT/PT consult to assist with strengthening/mobility  Outcome: Progressing  Goal: Maintain or return to baseline ADL function  Description: INTERVENTIONS:  -  Assess patient's ability to carry out ADLs; assess patient's baseline for ADL function and identify physical deficits which impact ability to perform ADLs (bathing, care of mouth/teeth, toileting, grooming, dressing, etc )  - Assess/evaluate cause of self-care deficits   - Assess range of motion  - Assess patient's mobility; develop plan if impaired  - Assess patient's need for assistive devices and provide as appropriate  - Encourage maximum independence but intervene and supervise when necessary  - Involve family in performance of ADLs  - Assess for home care needs following discharge   - Consider OT consult to assist with ADL evaluation and planning for discharge  - Provide patient education as appropriate  Outcome: Progressing  Goal: Maintain or return mobility status to optimal level  Description: INTERVENTIONS:  - Assess patient's baseline mobility status (ambulation, transfers, stairs, etc )    - Identify cognitive and physical deficits and behaviors that affect mobility  - Identify mobility aids required to assist with transfers and/or ambulation (gait belt, sit-to-stand, lift, walker, cane, etc )  - [place] fall precautions as indicated by assessment  - Record patient progress and toleration of activity level on Mobility SBAR; progress patient to next Phase/Stage  - Instruct patient to call for assistance with activity based on assessment  - Consider rehabilitation consult to assist with strengthening/weightbearing, etc   Outcome: Progressing     Problem: DISCHARGE PLANNING  Goal: Discharge to home or other facility with appropriate resources  Description: INTERVENTIONS:  - Identify barriers to discharge w/patient and caregiver  - Arrange for needed discharge resources and transportation as appropriate  - Identify discharge learning needs (meds, wound care, etc )  - Arrange for interpretive services to assist at discharge as needed  - Refer to Case Management Department for coordinating discharge planning if the patient needs post-hospital services based on physician/advanced practitioner order or complex needs related to functional status, cognitive ability, or social support system  Outcome: Progressing     Problem: Knowledge Deficit  Goal: Patient/family/caregiver demonstrates understanding of disease process, treatment plan, medications, and discharge instructions  Description: Complete learning assessment and assess knowledge base  Interventions:  - Provide teaching at level of understanding  - Provide teaching via preferred learning methods  Outcome: Progressing     Problem: Potential for Falls  Goal: Patient will remain free of falls  Description: INTERVENTIONS:  - Assess patient frequently for physical needs  -  Identify cognitive and physical deficits and behaviors that affect risk of falls    -  [place] fall precautions as indicated by assessment   - Educate patient/family on patient safety including physical limitations  - Instruct patient to call for assistance with activity based on assessment  - Modify environment to reduce risk of injury  - Consider OT/PT consult to assist with strengthening/mobility  Outcome: Progressing

## 2021-04-05 PROBLEM — R42 DIZZINESS: Status: ACTIVE | Noted: 2021-04-05

## 2021-04-05 PROCEDURE — 97163 PT EVAL HIGH COMPLEX 45 MIN: CPT

## 2021-04-05 PROCEDURE — 99231 SBSQ HOSP IP/OBS SF/LOW 25: CPT | Performed by: PHYSICIAN ASSISTANT

## 2021-04-05 PROCEDURE — 99254 IP/OBS CNSLTJ NEW/EST MOD 60: CPT

## 2021-04-05 PROCEDURE — 97167 OT EVAL HIGH COMPLEX 60 MIN: CPT

## 2021-04-05 PROCEDURE — 99232 SBSQ HOSP IP/OBS MODERATE 35: CPT | Performed by: INTERNAL MEDICINE

## 2021-04-05 PROCEDURE — 97760 ORTHOTIC MGMT&TRAING 1ST ENC: CPT

## 2021-04-05 RX ORDER — SODIUM CHLORIDE 9 MG/ML
100 INJECTION, SOLUTION INTRAVENOUS CONTINUOUS
Status: DISCONTINUED | OUTPATIENT
Start: 2021-04-05 | End: 2021-04-07

## 2021-04-05 RX ADMIN — HYDROMORPHONE HYDROCHLORIDE 0.2 MG: 1 INJECTION, SOLUTION INTRAMUSCULAR; INTRAVENOUS; SUBCUTANEOUS at 09:10

## 2021-04-05 RX ADMIN — ENOXAPARIN SODIUM 40 MG: 40 INJECTION SUBCUTANEOUS at 09:52

## 2021-04-05 RX ADMIN — DOCUSATE SODIUM 100 MG: 100 CAPSULE, LIQUID FILLED ORAL at 17:34

## 2021-04-05 RX ADMIN — PANTOPRAZOLE SODIUM 20 MG: 20 TABLET, DELAYED RELEASE ORAL at 05:37

## 2021-04-05 RX ADMIN — DOCUSATE SODIUM 100 MG: 100 CAPSULE, LIQUID FILLED ORAL at 09:52

## 2021-04-05 RX ADMIN — CYCLOBENZAPRINE HYDROCHLORIDE 5 MG: 10 TABLET, FILM COATED ORAL at 09:51

## 2021-04-05 RX ADMIN — LEVOTHYROXINE SODIUM 175 MCG: 100 TABLET ORAL at 05:37

## 2021-04-05 RX ADMIN — SODIUM CHLORIDE 100 ML/HR: 0.9 INJECTION, SOLUTION INTRAVENOUS at 11:14

## 2021-04-05 RX ADMIN — OXYCODONE HYDROCHLORIDE 5 MG: 5 TABLET ORAL at 23:23

## 2021-04-05 RX ADMIN — SODIUM CHLORIDE 100 ML/HR: 0.9 INJECTION, SOLUTION INTRAVENOUS at 23:16

## 2021-04-05 RX ADMIN — OXYCODONE HYDROCHLORIDE 5 MG: 5 TABLET ORAL at 14:51

## 2021-04-05 RX ADMIN — OXYCODONE HYDROCHLORIDE 5 MG: 5 TABLET ORAL at 09:51

## 2021-04-05 NOTE — UTILIZATION REVIEW
Continued Stay Review    Date: 4/5/21                          Current Patient Class:   Observation  Current Level of Care:    Med surg    HPI:55 y o  female initially admitted on    Observation  4/3  @    2115    With   Left wrist/right  Tibia fracture  04/05/21 1632  Inpatient Admission Once     Question Answer Comment   Level of Care Med Surg    Estimated length of stay More than 2 Midnights    Certification I certify that inpatient services are medically necessary for this patient for a duration of greater than two midnights  See H&P and MD Progress Notes for additional information about the patient's course of treatment  04/05/21 1632     Observation   4/3  @  2115  Changed to Ip admission  4/5  @  1632  The patient will continue to require additional inpatient hospital stay due to Ongoing PT OT evaluation, IV hydration and rehabilitation set up  Ortho consult  ( 4/4)     Plan  NWB  RLE and L wrist   RLE  In  Knee immobilizer  Plan non operative management  For now  Continue pain control as  Needed  Assessment/Plan:   4/5   Continue conservative management for right knee and left wrist, bot fractures are  Nondisplaced at this time  Maintain splint  Left wrist   Remains  NWB  RLE  And  L wrist  Needs  PT/OT  STR  Recommended  Contue  Pain control  4/6      Continue  PT/OT  Continue  Conservative  Treatment for  Right knee and left wrist    Maintain splint  Left wrist   Remains  NWB   RLE  And L wrist   Continue  Pain control  Needs  STR      Pertinent Labs/Diagnostic Results:       Results from last 7 days   Lab Units 04/04/21  0513 04/03/21 2121   WBC Thousand/uL 5 03 7 76   HEMOGLOBIN g/dL 10 9* 12 5   HEMATOCRIT % 33 9* 37 8   PLATELETS Thousands/uL 129* 165   NEUTROS ABS Thousands/µL  --  5 39         Results from last 7 days   Lab Units 04/04/21  0513 04/03/21 2121   SODIUM mmol/L 142 141   POTASSIUM mmol/L 3 9 4 0   CHLORIDE mmol/L 107 105   CO2 mmol/L 27 26   ANION GAP mmol/L 8 10   BUN mg/dL 11 14   CREATININE mg/dL 0 72 0 74   EGFR ml/min/1 73sq m 94 91   CALCIUM mg/dL 9 0 9 8             Results from last 7 days   Lab Units 04/04/21  0513 04/03/21  2121   GLUCOSE RANDOM mg/dL 113 112         Vital Signs:   98 °F (36 7 °C)  94  18  115/55  --  96 %  None (Room air)  Lying     04/05/21 1105  --  --  --  121/75  --  --  --  Sitting - Orthostatic VS   04/05/21 1100  --  --  --  111/63  --  --  --  Lying - Orthostatic VS   04/05/21 0700  97 8 °F (36 6 °C)  81  18  117/73  --  97 %  None (Room air)  Lying         Medications:   Scheduled Medications:  docusate sodium, 100 mg, Oral, BID  enoxaparin, 40 mg, Subcutaneous, Q24H DELROY  levothyroxine, 175 mcg, Oral, Early Morning  pantoprazole, 20 mg, Oral, Early Morning      Continuous IV Infusions:  sodium chloride, 100 mL/hr, Intravenous, Continuous      PRN Meds:  acetaminophen, 650 mg, Oral, Q6H PRN  aluminum-magnesium hydroxide-simethicone, 30 mL, Oral, Q6H PRN  calcium carbonate, 1,000 mg, Oral, Daily PRN  cyclobenzaprine, 5 mg, Oral, TID PRN  HYDROmorphone, 0 2 mg, Intravenous, Q4H PRN   ( x1   4/5  Thus far)  ondansetron, 4 mg, Intravenous, Q6H PRN  oxyCODONE, 2 5 mg, Oral, Q4H PRN  oxyCODONE, 5 mg, Oral, Q4H PRN  simethicone, 80 mg, Oral, 4x Daily PRN        Discharge Plan:    TBD    Network Utilization Review Department  ATTENTION: Please call with any questions or concerns to [phone] and carefully listen to the prompts so that you are directed to the right person  All voicemails are confidential   Lia Ortega all requests for admission clinical reviews, approved or denied determinations and any other requests to dedicated fax number below belonging to the campus where the patient is receiving treatment   List of dedicated fax numbers for the Facilities:  [address] DENIALS (Administrative/Medical Necessity) [phone]   [address] (Maternity/NICU/Pediatrics) [phone] [address] [facility] [phone]   [address] [phone]   [address] [address] [place] [address] Sherwin Rodriguez [facility] [phone]   [place] [id number] [phone]

## 2021-04-05 NOTE — PLAN OF CARE
Problem: OCCUPATIONAL THERAPY ADULT  Goal: Performs self-care activities at highest level of function for planned discharge setting  See evaluation for individualized goals  Description: Treatment Interventions: ADL retraining, Functional transfer training, UE strengthening/ROM, Endurance training, Cognitive reorientation, Patient/family training, Equipment evaluation/education, Compensatory technique education, Fine motor coordination activities, Energy conservation, Activityengagement, Continued evaluation          See flowsheet documentation for full assessment, interventions and recommendations  Note: Limitation: Decreased ADL status, Decreased UE strength, Decreased Safe judgement during ADL, Decreased cognition, Decreased endurance, Decreased self-care trans, Decreased high-level ADLs(NWB LUE & RLE)  Prognosis: Good  Assessment: Pt is a 63 y o  female admitted to [address] [id number] on 4/3/21 with c/o L wrist pain and R LE pain after falling out of her car and hitting her head on the curb c brief LOC  Pt noted with ambulatory dysfunction c head injury (forehead abrasion) CT head (-), L distal radial fx, and R tibial plateau fx  Pt is currently NWB L wrist w sugar-tong splint, NWB RLE in hinged knee brace locked in extension and is a high falls risk  Pt is able to utilize platform walker for OOB mobility per Ortho  Pt PMH includes Gastric bypass, hypothyroidism, and GERD  At baseline PTA, pt states independence with all aspects of her ADLs, transfers, ambulation with no AD; + falls=1, +, + home alone  During evaluation of functional mobility, pt demonstrated mod A x2 c sit<>stand transfers using platform RW with cueing throughout for safety and positioning  Pt had dizziness, increased pain, and hypotension when standing  BP taken and reported to [facility] [place]  Limited functional mobility assessment 2* hypotension and pain   For the initial eval, pt demonstrated deficits c activity tolerance (currently fair, 10-15 min), functional mobility, functional balance, ADL status, L UE strength, and transfer safety  Pt would benefit from continued OT tx to improve their level of independence and address the above deficits, 3-5xwk/1-2 wks  Currently recommend D/C to [facility] when medically appropriate  OT will continue to follow pt on OT caseload with the following goals       OT Discharge Recommendation: Post-Acute Rehabilitation Services  OT - OK to Discharge: Yes(recommend d/c to STR when medically appropriate)

## 2021-04-05 NOTE — PROGRESS NOTES
Orthopaedic Surgery - Progress Note  Elzbieta Biggs (50 y o  female)   : 1965   MRN: [id number]  Date: 2021   Encounter: [id number]   Unit/Bed#: E2 -01    Assessment / Plan  Right knee nondisplaced tibial plateau fracture  Left wrist distal radius nondisplaced fracture    · Continue conservative treatment for both the right knee and left wrist   Both fractures are nondisplaced at this time  · Maintain sugar-tong splint left wrist   · Patient will be fitted with a TROM brace right knee locked in extension  · Patient should maintain nonweightbearing on both the right leg and left wrist   Order was placed for a platform walker on the left side so she can bear weight through her elbow in the sugar-tong splint  · Continue with ice and analgesics as needed  · PT for gait training with platform walker  · Patient's placement versus discharge home pending therapy  Will need to plan outpatient follow-up with Dr Misti Berry in 1 week for repeat x-rays of both the left wrist and right knee  Subjective  53-year-old female status post fall on 2021 where she sustained a right tibial plateau fracture and left wrist distal radius fracture  While lying in bed the patient feels her pain is controlled but she does not feel she can move her right leg well at this time  She has not been out of bed with physical therapy up to this point  She denies any distal numbness or tingling in either the right leg or the left arm at this time  She is tolerating the left sugar-tong splint well  Vitals  Temp:  [97 8 °F (36 6 °C)-98 2 °F (36 8 °C)] 97 8 °F (36 6 °C)  HR:  [81-90] 81  Resp:  [16-18] 18  BP: (117-140)/(60-75) 117/73  Body mass index is 36 58 kg/m²  I/O last 24 hours: In: 118 [P O :118]  Out: 1900 [Urine:1900]    Right Knee Exam  Alignment:  Normal knee alignment  Inspection:  Patient does have moderate right knee swelling with some superficial ecchymosis and excoriations anteriorly    Palpation: Moderate tenderness at Area generally around the right knee at this time  ROM:  Not tested  Strength:  5/5 AT, GSC, PT, and peroneals  Stability:  Not tested  Tests:  No pertinent positive or negative tests  Patella:  Not tested  Neurovascular:  Sensation intact in DP/SP/Cochran/Sa/T nerve distributions  Sensation intact in all digital nerve distributions  Toes warm and perfused  Gait:  Not tested  Left Hand & Wrist Exam  Alignment:  Alignment is maintained in sugar-tong splint at this time  Inspection:  Known notable swelling or discoloration of the hand or fingers  Sugar-tong splint is clean in place at this time  Palpation:  Mild tenderness at The area around the fracture site  ROM:  Not tested  Strength:  Not tested  Stability:  Not tested  Tests:  No pertinent positive or negative tests  Neurovascular:  Sensation intact in Ax/R/M/U nerve distributions  Sensation intact in all digital nerve distributions  Fingers warm and perfused       Lab Results  (I have personally reviewed pertinent lab results )  Results from last 7 days   Lab Units 04/04/21  0513 04/03/21  2121   WBC Thousand/uL 5 03 7 76   HEMOGLOBIN g/dL 10 9* 12 5   HEMATOCRIT % 33 9* 37 8   PLATELETS Thousands/uL 129* 165         Results from last 7 days   Lab Units 04/04/21  0513 04/03/21  2121   POTASSIUM mmol/L 3 9 4 0   CHLORIDE mmol/L 107 105   CO2 mmol/L 27 26   BUN mg/dL 11 14   CREATININE mg/dL 0 72 0 74   EGFR ml/min/1 73sq m 94 91   CALCIUM mg/dL 9 0 9 8               Angel Duncan PA-C

## 2021-04-05 NOTE — CONSULTS
PHYSICAL MEDICINE AND REHABILITATION CONSULT NOTE  Cameron Ashford 63 y o  female MRN: [id number]  Unit/Bed#: E2 -01 Encounter: [id number]    Requested by:   Carmen Bingham MD  Reason for Consultation:  Rehabilitation medicine evaluation and assistance with appropriate disposition  Primary Rehabilitation Diagnosis:   Orthopedic Disorders:  08 4  Major Multiple Fractures    Etiologic Diagnosis:  R tibial plateau fracture, L distal radius fracture      Assessment and Recommendations:  Cameron Ashford is a 63 y o  female with PMH of hypothyroidism, GERD, who recently fell while getting out of her care in which she apparently fell on her face and hands, hitting her head with brief LOC followed by pain in L wrist and R leg who presented to hospital unable to ambulate  CT head showed mild frontal/forehead hematoma without acute intracranial findings  Imaging also revealed R comminuted nondisplaced tibial plateau fracture and left distal radius fracture  Orthopedics consulted and recommended NWB of R leg and L wrist   She is to use sugar tong splint for wrist fracture and TROM brace locked in extension for R leg  Patient may WB thru L elbow and use platform walker for ambulation  Patient recommended ortho follow-up with repeat x-rays in about 1 week (around 4/12)  Course also notable for some dizziness and orthostatic hypotension and she has been receiving IVF  Prior Level of Function and Social history:    Independent with ADLs, ambulation, and cognition  [place] with 2-3 MICHELLE  She works 2 [employer]  Son lives with her but works during day  Has other supportive daughter that lives in area and could possibly assist or have her stay with her        Current Level of Function:    Bed mobility mod assist x2, transfers mod assist x2  Pending OT assessment > expected to have significant ADLs needs    Decline in ADLs and mobility:  Multifactorial:      Acute: Pain, Decreased ROM - RLE knee brace locked into extension, LUE sugar tong brace, NWB precautions RUE and L wrist  Chronic:  Obesity  - Optimal management of each as listed    Primary rehab diagnosis:    - R comminuted nondisplaced tibial plateau fracture and left distal radius fracture    - NWB of RLE and NWB L wrist can WB thru L elbow using platform walker; TROM brace locked in extension for R leg    - Continue PT, OT while in acute setting to improve functional mobility, transfers, upper and lower body strengthening, conditioning, balance, and gait training with appropriate assistive device    - Follow-up orthopedics with repeat films around 4/12    Overall Medical Status:  - +symptomatic orthostasis otherwise vitals stable, patient saturating well on room air; anemia, constipation   - Recent labs - Hb dropped from 12 5 to 10 9 since yesterday; platelet count dropped from 165 to 129  - Additional Work-up/management prior to potential transfer to rehab - none   - Continue to monitor vitals, labs, exam closely     Anemia - fair drop in Hb since yesterday  - Monitor labs, vitals including orthostatics  - Consider ferrous sulfate and Vit C    Dizziness + orthostasis  - Monitor for symptomatic orthostasis - daily and PRN  - DEMETRIUS hose and abdominal binder prior to and when OOB PRN   - Monitor Hb and hydration status     Pain - significant at times particularly RLE   - Mgmt per primary team currently: APAP 650mg Q6H PRN; Oxy 2 5-5mg Q4H PRN; dilaudid IV 0 2mg IV Q4H PRN  for BTP, cyclobenzaprine 5mg TID PRN spasms   Counseled on and continue to encourage deep breathing/relaxation/behavioral pain management techniques  - hold opiates, muscle relaxants, for oversedation, AMS, or RR<12   - Optimize pain control but limit oversedation    Mood - Risk of difficulty coping   - Supportive counseling     Cognition - Grossly intact  -  Monitor for potential deficits     Hydration -   - ensure adequate hydration but avoid fluid overload     Nutrition -   - ensure optimal intake Bladder function - intact  - monitor symptoms and output     Bowel function - LBM a few days ago but does not want to take more meds for fear of needing to stool in bed  - Pt education on importance of adequate stooling; mobilize OOB as able   - When patient allows intensify regimen - colace/senna BID; PRN miralax/dulcolax supp     Encounter for skin care - risk of skin breakdown   - Frequent turning, Q2H, EHOB cushion when OOB in chair  - Consider hydragard BID for buttocks, sacrum, and heels  - Monitor closely       VTE prophylaxis   - As outlined by primary team      # Other medical issues:     Per primary service     Disposition recommendation:   IRF/ARC - patient is expected to be a good candidate for transfer to ARC/IRF pending insurance authorization and completion of OT showing appropriate functional needs which is expected    RATIONALE:   I have reviewed this patient's medical and functional history, hospital course, skilled therapy notes, and have evaluated the patient in person  In my professional judgment and rehabilitation experience, this patient MEETS the medical necessity criteria for an inpatient ARU stay:   A  Condition requires supervision by a rehabilitation physician, defined as a licensed physician with specialized training and experience in inpatient rehabilitation  The requirement for medical supervision means that the rehabilitation physician must conduct face-to-face visits with the patient at least 3 days per week throughout the patient's stay in the IRF to assess the patient both medically and functionally, as well as to modify the course of treatment as needed to maximize the patient's capacity to benefit from the rehabilitation process  B  Patient requires an intensive and coordinated interdisciplinary team approach of providing rehabilitation    Under current industry standards, this intensive rehabilitation therapy program generally consists of at least 3 hours of therapy per day at least 5 days per week  In certain well-documented cases, this intensive rehabilitation therapy program might instead consist of at least 15 hours of intensive rehabilitation therapy within a 7 consecutive day period, beginning with the date of admission to the IRF  C  Patient is reasonably expected to adequately participate, and benefit significantly from, the intensive rehabilitation therapy program at time of admission to the IRF  The patient can only be expected to benefit significantly from the intensive rehabilitation therapy program if the patient's condition and functional status are such that the patient can reasonably be expected to make measurable improvement (that will be of practical value to improve the patient's functional capacity or adaptation to impairments) as a result of the rehabilitation treatment, and if such improvement can be expected to be made within a prescribed period of time  The patient need not be expected to achieve complete independence in the domain of self-care nor be expected to return to his or her prior level of functioning in order to meet this standard  D  The patient must require the active and ongoing therapeutic intervention of multiple therapy disciplines (PT, OT, SLP, or prosthetics/orthotics), one of which must be PT or OT  Medicare Benefit Policy Manual Chapter 1 - Inpatient Hospital Services Covered Under Part A; 110 2 - Inpatient Rehabilitation Facility Medical Necessity Criteria NameRegulator es  pdf       ==================================================================    Chief Complaint:  Pain    History of Present Illness:   Zev Driscoll is a 63 y o  female with PMH of hypothyroidism, GERD, who recently fell while getting out of her care in which she apparently fell on her face and hands, hitting her head with brief LOC followed by pain in L wrist and R leg who presented to hospital unable to ambulate  CT head showed mild frontal/forehead hematoma without acute intracranial findings  Imaging also revealed R comminuted nondisplaced tibial plateau fracture and left distal radius fracture  Orthopedics consulted and recommended NWB of R leg and L wrist   She is to use sugar tong splint for wrist fracture and TROM brace locked in extension for R leg  Patient may WB thru L elbow and use platform walker for ambulation  Patient recommended ortho follow-up with repeat x-rays in about 1 week (around 4/12)  Course also notable for some dizziness and orthostatic hypotension and she has been receiving IVF  On eval, patient reports significant R knee/leg pain at times and at times throbbing worse with moving; She reports less significant L wrist pain  She notes some soreness where she hit her forehead but no other headache, visual changes, focal changes, in strength or sensation, or light sensitivity; She noted some lightheadedness with changes in position she reports rarely having that at home  Patient denies CP, SOB, cough  She reports last BM a few days ago but is hesitant to take meds and stool in bed  She denies urinary problem fever, chills, sweats, calf pain, or other new complaints  Review of Systems:     Complete review of systems obtained  Please see HPI for details with other significant symptoms or history listed here: Otherwise, 14 point review of systems completed and was otherwise unremarkable      No Known Allergies    Current Facility-Administered Medications:     acetaminophen (TYLENOL) tablet 650 mg, 650 mg, Oral, Q6H PRN, Barry Tim DO    aluminum-magnesium hydroxide-simethicone (MYLANTA) oral suspension 30 mL, 30 mL, Oral, Q6H PRN, Reina Ness, DO    calcium carbonate (TUMS) chewable tablet 1,000 mg, 1,000 mg, Oral, Daily PRN, Reina Epp, DO    cyclobenzaprine (FLEXERIL) tablet 5 mg, 5 mg, Oral, TID PRN, Reina Ness DO, 5 mg at 04/05/21 0951    docusate sodium (COLACE) capsule 100 mg, 100 mg, Oral, BID, Barry Tim, DO, 100 mg at 04/05/21 7113    enoxaparin (LOVENOX) subcutaneous injection 40 mg, 40 mg, Subcutaneous, Q24H DELROY, Barry Tim, DO, 40 mg at 04/05/21 6460    HYDROmorphone (DILAUDID) injection 0 2 mg, 0 2 mg, Intravenous, Q4H PRN, Barry Tim, DO, 0 2 mg at 04/05/21 0910    levothyroxine tablet 175 mcg, 175 mcg, Oral, Early Morning, Barry Tim, DO, 175 mcg at 04/05/21 0537    ondansetron (ZOFRAN) injection 4 mg, 4 mg, Intravenous, Q6H PRN, Barry Tim DO    oxyCODONE (ROXICODONE) IR tablet 2 5 mg, 2 5 mg, Oral, Q4H PRN, Barry Tim, DO    oxyCODONE (ROXICODONE) IR tablet 5 mg, 5 mg, Oral, Q4H PRN, Barry Tim, DO, 5 mg at 04/05/21 1451    pantoprazole (PROTONIX) EC tablet 20 mg, 20 mg, Oral, Early Morning, Barry Tim, DO, 20 mg at 04/05/21 0537    simethicone (MYLICON) chewable tablet 80 mg, 80 mg, Oral, 4x Daily PRN, Barry Tim, DO    sodium chloride 0 9 % infusion, 100 mL/hr, Intravenous, Continuous, Elsi Du MD, Last Rate: 100 mL/hr at 04/05/21 1114, 100 mL/hr at 04/05/21 1114    Past Medical History:   Diagnosis Date    Disease of thyroid gland        Past Surgical History:   Procedure Laterality Date    GASTRIC BYPASS      HERNIA REPAIR        History reviewed  No pertinent family history      Social History available currently in EMR:  (See additional SH as outlined above)   Social History     Socioeconomic History    Marital status: /Civil Union     Spouse name: None    Number of children: None    Years of education: None    Highest education level: None   Occupational History    None   Social Needs    Financial resource strain: None    Food insecurity     Worry: None     Inability: None    Transportation needs     Medical: None     Non-medical: None   Tobacco Use    Smoking status: Never Smoker    Smokeless tobacco: Never Used   Substance and Sexual Activity    Alcohol use: Not Currently     Frequency: Never     Drinks per session: Patient refused     Binge frequency: Never    Drug use: No    Sexual activity: Not Currently   Lifestyle    Physical activity     Days per week: None     Minutes per session: None    Stress: None   Relationships    Social connections     Talks on phone: None     Gets together: None     Attends Muslim service: None     Active member of club or organization: None     Attends meetings of clubs or organizations: None     Relationship status: None    Intimate partner violence     Fear of current or ex partner: None     Emotionally abused: None     Physically abused: None     Forced sexual activity: None   Other Topics Concern    None   Social History Narrative    None       Physical Examination:   Temp:  [97 8 °F (36 6 °C)-98 2 °F (36 8 °C)] 97 8 °F (36 6 °C)  HR:  [81-90] 81  Resp:  [16-18] 18  BP: (111-140)/(60-75) 121/75  General: Awake, alert in NAD  HENT: MMM, L forehead swelling/contusion  Neck: Supple, no lymphadenopathy  Respiratory: Unlabored breathing, breath sounds equal, Lungs CTA, no wheezes, rales, or rhonchi  Cardiovascular: Regular rate and rhythm, no murmurs, rubs, or gallops  Gastrointestinal: Soft, non-tender, somewhat-distended,hypooactive bowel sounds  Genitourinary: No payne  SkiN/MSK/Extremities:    L arm in sugar tong brace able to wiggle fingers with adequate temp and cap refill; RLE in brace locked in extension; neurovascularly intact  Neurologic/Psych:   MENTAL STATUS: awake, oriented to person, place, time, and situation  Affect: Euthymic   CN II-XII: intact  Strength/MMT:  RUE full; prox RUE full; LLE full; RLE limited due to brace  FTN intact on R     Data:  Lab Results   Component Value Date    HGB 10 9 (L) 04/04/2021    HCT 33 9 (L) 04/04/2021    WBC 5 03 04/04/2021    K 3 9 04/04/2021     04/04/2021    GLUCOSE 110 10/27/2016    CREATININE 0 72 04/04/2021    BUN 11 04/04/2021         Xr Wrist 3+ Views Left    Result Date: 4/4/2021  Impression: Likely acute, nondisplaced distal radial metaphysis fracture  This is evidenced by cortical step off over the dorsal radial aspect of the bone  Workstation performed: [id number]     Xr Knee 4+ Vw Right Injury    Result Date: 4/4/2021  Impression: Acute, nondisplaced, nondepressed tibial plateau fracture  Workstation performed: [id number]     Ct Head Without Contrast    Result Date: 4/3/2021  Impression: No acute intracranial abnormality  Workstation performed: [id number]     Ct Lower Extremity Wo Contrast Right    Result Date: 4/3/2021  Impression: Comminuted, nondisplaced tibial plateau fracture  The study was marked in [facility] for immediate notification  Workstation performed: [id number]       Pertinent labs and imaging reviewed  Additional medical goals while in ARF:    Orthopedic Disorder: polytrauma R tibial fx, L radial fx with braces and NWB precautions causing impaired mobility, ADLs, and gait:  intensive skilled therapies with physical therapy and occupational therapy with close oversight and management by rehab specialized physician in acute rehabilitation setting to most expeditiously and effectively improve functional mobility, transfers, upper and lower body strengthening, conditioning, balance, and gait training with appropriate assistive device  Patient will have optimal supervision and management of patient's underlying orthopedic disorder with specialized rehabilitation physician during this period of recovery to ensure most expeditious and optimal recovery with decreased risks of fall/injury and other complications including acute worsening of ortho disorder, decrease risk of VTE, PNA, and skin ulceration    Orthostasis/Cardiopulmonary function/Anemia: Ensure cardiopulmonary stability and optimize cardiopulmonary function not only at rest but with activity as patient's activity level significantly increases in acute rehab compared with prior to transfer in preparation for safe discharge from ARC   Must closely and frequently monitor blood pressure, HR, anemia to ensure adequate cardiac output during ADLs and ambulation as patient is at increased risk for orthostatic hypotension/syncope and potential injury if not monitored for and managed adequately  Blood pressure management:    Frequent monitoring of blood pressure with appropriate adjustments in blood pressure medication management to optimize blood pressure control and prevent/limit renal complications  Monitoring impact of blood pressure and side-effects of blood pressure medications at rest and with activity  Pain management:  Pain will improve with frequent evaluation of pain, careful adjustments in medications, frequent re-evaluation of patient's pain and medical/neurologic status to ensure optimal pain control, avoidance of potential serious and even life-threatening side-effects and drug interactions, as well as weaning pain medications as soon as possible to decrease risk of short and long-term use  Inpatient rehabilitation education/teaching: To be provided to patient and typically family/caregiver (if able to be identified) by all skilled therapists, rehab nursing, case management, and rehab specialized physician to ensure optimal recovery and decrease risks of complications in both acute rehabilitation setting as well as after discharge  Anxiety: Patient's anxiety and it's impact on therapy participation and functional recovery will improve during course with supportive counseling, relaxation/breathing techniques and if necessary medication management  Requires frequent re-assessment and close management to ensure anxiety/depression management during acute rehab course with planning for appropriate outpatient management to ensure optimal mental health and functional recovery      Bowel dysfunction: Appropriate bowel management with appropriate toileting program from rehab nursing and staff with oversight management by rehabilitation physicians which include adjustments in medications to optimize appropriate bowel function and prevent/decrease risk of constipation and bowel obstruction  Obesity:  Close monitoring of nutrition status, nutrition specialist with adjustments in diet   on appropriate short and long term nutrition and activity  Obesity increases complexity of patient's overall condition and causes unique challenges during this part of patient's recovery process  Supervise and if necessary make adjustments in rehab nursing care and skilled therapy care to ensure appropriate toileting, bed mobility, other ADLs, and ambulation to decrease risk of falls/injuries, VTE, skin breakdown/ulceration and optimize functional recovery  Thank you for allowing the PM&R service to participate in the care of this patient  We will continue to follow Cris Hyde's progress with you  Please do not hesitate to call with questions or concerns      Gaetano Grey MD, [facility]  Physical Medicine and Rehabilitation  Brain Injury Medicine

## 2021-04-05 NOTE — PLAN OF CARE
Problem: PHYSICAL THERAPY ADULT  Goal: Performs mobility at highest level of function for planned discharge setting  See evaluation for individualized goals  Description: Treatment/Interventions: Functional transfer training, LE strengthening/ROM, Elevations, Therapeutic exercise, Patient/family training, Equipment eval/education, Bed mobility, Compensatory technique education, Continued evaluation, Spoke to nursing, OT, Gait training(wc mobility)  Equipment Recommended: (will continue to evaluate DME needs)       See flowsheet documentation for full assessment, interventions and recommendations  Note: Prognosis: Good  Problem List: Decreased strength, Orthopedic restrictions, Pain, Decreased range of motion, Decreased endurance, Impaired balance, Decreased mobility, Obesity  Assessment: Phoenix Ugarte is a 63 y o  female admitted to [facility] on 4/3/2021 for Ambulatory dysfunction  Per ortho note 4/5/21: "Right knee nondisplaced tibial plateau fracture  Left wrist distal radius nondisplaced fracture  Continue conservative treatment for both the right knee and left wrist " PT was consulted and pt was seen on 4/5/2021 for mobility assessment and d/c planning  Pt presents NWB L wrist w splint, NWB RLE in HKB locked in extension, high fall risk  Pt is currently functioning at a moderate assistance x2 level for bed mobility, moderate assistance x2 level for transfers  Also mod Ax1-2 to laterally scoot along EOB 2'  Pt demonstrated good understanding of NWB LUE and RLE  Agreeable to HKB at this time  Brace fit in supine w no reported change in pain with brace on  However did experience some discomfort with repositioning of RLE  OT present to help with repositioning/support of RLE when brace being fit  Demonstrated good compliance w NWB L wrist during bed mobility   Able to stand w LUE on platform RW and RUE pushing from EOB w bed height slightly elevated, Ax1 to support stand from the hips/trunk and RLE supported Ax1 for comfort  Upon standing pt w increase in pain  Continue to require Ax1 to support RLE  Tolerated standing about 20" prior to needing to sit d/t pain and onset of dizziness  Vital signs did show drop in BP w positional changes; improved w return to supine positioning  Depending on progress in therapy (compliance w WBS, standing endurance, ambulatory balance), would also consider trial of slide board transf and wc mobility vs hopping w platform RW  Pt will benefit from continued skilled IP PT to address the above mentioned impairments  in order to maximize recovery and increase functional independence when completing mobility and ADLs  At this time PT recommendations for d/c are STR given environmental and social barriers, decline in function secondary to abovementioned factors, need for further mobility training d/t change in WBS, high fall risk  Barriers to Discharge: Inaccessible home environment, Decreased caregiver support  Barriers to Discharge Comments: [place]ona, +home alone  PT Discharge Recommendation: Post-Acute Rehabilitation Services     PT - OK to Discharge: Yes    See flowsheet documentation for full assessment

## 2021-04-05 NOTE — PROGRESS NOTES
[address] 47  Progress Note Dioni Ni 1965, 63 y o  female MRN: [id number]  Unit/Bed#: E2 -01 Encounter: [id number]  Primary Care Provider: Herbert Head MD   Date and time admitted to hospital: 4/3/2021  5:31 PM    * Tibial plateau fracture  Assessment & Plan  Comminuted, nondisplaced RT  tibial plateau fracture  Plan for conservative treatment and non operative management  Currently has a right knee immobilizer in place  Patient will be fitted with a TROM brace right knee locked in extension  Patient should maintain nonweightbearing onright leg and left wrist   Ambulate using a  platform walker on the left side so she can bear weight through her elbow in the sugar-tong splint  Continue with ice and analgesics as needed  · Patient seen by Physical therapy and recommended rehabilitation  Physiatry consultation will be obtained  Dizziness  Assessment & Plan  Noted to have orthostatic hypotension upon PT evaluation  Unclear if that contributed to her fall  Continue with IV hydration  Continue to follow orthostatic vitals  Left wrist distal radius nondisplaced fracture  Assessment & Plan  Status post fall  X-rays with left wrist distal radius nondisplaced fracture  Currently has splint in place  Continue with nonweightbearing and ambulate with a platform walker  Seen by orthopedics  Non operative management  Close outpatient orthopedic follow-up on discharge    GERD (gastroesophageal reflux disease)  Assessment & Plan  Resume home medication with Protonix and Mylanta  Hypothyroidism  Assessment & Plan  Recently reduced to 175 mcg of levothyroxine -      VTE Pharmacologic Prophylaxis:   Pharmacologic: Heparin  Mechanical VTE Prophylaxis in Place: No    Patient Centered Rounds: I have performed bedside rounds with nursing staff today      Discussions with Specialists or Other Care Team Provider:  Discussed with orthopedics in case management    Education and Discussions with Family / Patient:  Discussed with patient's sister over the phone  Time Spent for Care: 20 minutes  More than 50% of total time spent on counseling and coordination of care as described above  Current Length of Stay: 0 day(s)    Current Patient Status: Observation   Certification Statement: The patient will continue to require additional inpatient hospital stay due to Ongoing PT OT evaluation, IV hydration and rehabilitation set up  Discharge Plan:  Currently not medically stable for discharge  Any greater than 2 midnight stay for IV hydration and rehabilitation set up    Code Status: Level 1 - Full Code      Subjective:   Patient seen and examined at bedside, reports dizziness  Complains of moderate pain in the right lower extremity and left wrist   Denies any chest pain or shortness of breath  Objective:     Vitals:   Temp (24hrs), Av °F (36 7 °C), Min:97 8 °F (36 6 °C), Max:98 2 °F (36 8 °C)    Temp:  [97 8 °F (36 6 °C)-98 2 °F (36 8 °C)] 97 8 °F (36 6 °C)  HR:  [81-90] 81  Resp:  [16-18] 18  BP: (111-140)/(60-75) 121/75  SpO2:  [97 %-99 %] 97 %  Body mass index is 36 58 kg/m²  Input and Output Summary (last 24 hours): Intake/Output Summary (Last 24 hours) at 2021 1438  Last data filed at 2021 0501  Gross per 24 hour   Intake 118 ml   Output 1200 ml   Net -1082 ml       Physical Exam:     Physical Exam  Constitutional:       Appearance: She is obese  HENT:      Head: Normocephalic  Mouth/Throat:      Mouth: Mucous membranes are moist    Eyes:      Pupils: Pupils are equal, round, and reactive to light  Cardiovascular:      Rate and Rhythm: Normal rate and regular rhythm  Pulses: Normal pulses  Heart sounds: Normal heart sounds  Pulmonary:      Effort: Pulmonary effort is normal       Breath sounds: Normal breath sounds  Abdominal:      General: Abdomen is flat  Palpations: Abdomen is soft     Musculoskeletal:      Comments: Right knee immobilizer in place  Left wrist splint in place   Neurological:      General: No focal deficit present  Mental Status: She is alert and oriented to person, place, and time  Mental status is at baseline  Psychiatric:         Mood and Affect: Mood normal          Additional Data:     Labs:    Results from last 7 days   Lab Units 04/04/21  0513 04/03/21  2121   WBC Thousand/uL 5 03 7 76   HEMOGLOBIN g/dL 10 9* 12 5   HEMATOCRIT % 33 9* 37 8   PLATELETS Thousands/uL 129* 165   NEUTROS PCT %  --  70   LYMPHS PCT %  --  22   MONOS PCT %  --  7   EOS PCT %  --  0     Results from last 7 days   Lab Units 04/04/21  0513   SODIUM mmol/L 142   POTASSIUM mmol/L 3 9   CHLORIDE mmol/L 107   CO2 mmol/L 27   BUN mg/dL 11   CREATININE mg/dL 0 72   ANION GAP mmol/L 8   CALCIUM mg/dL 9 0   GLUCOSE RANDOM mg/dL 113                           * I Have Reviewed All Lab Data Listed Above  * Additional Pertinent Lab Tests Reviewed:  [facility] 65 Admission Reviewed    Imaging:    Imaging Reports Reviewed Today Include: Xrays reviewed      Recent Cultures (last 7 days):           Last 24 Hours Medication List:   Current Facility-Administered Medications   Medication Dose Route Frequency Provider Last Rate    acetaminophen  650 mg Oral Q6H PRN Barry Tim, DO      aluminum-magnesium hydroxide-simethicone  30 mL Oral Q6H PRN Manny Moritz, DO      calcium carbonate  1,000 mg Oral Daily PRN Manny Moritz, DO      cyclobenzaprine  5 mg Oral TID PRN Manny Moritz, DO      docusate sodium  100 mg Oral BID Barry Tim, DO      enoxaparin  40 mg Subcutaneous Q24H [facility] Barry Bo, DO      HYDROmorphone  0 2 mg Intravenous Q4H PRN Manny Moritz, DO      levothyroxine  175 mcg Oral Early Morning Barry Tim, DO      ondansetron  4 mg Intravenous Q6H PRN Manny Moritz, DO      oxyCODONE  2 5 mg Oral Q4H PRN Manny Moritz, DO      oxyCODONE  5 mg Oral Q4H PRN Manny Moritz, DO      pantoprazole  20 mg Oral Early Morning Hamilton Rung, DO      simethicone  80 mg Oral 4x Daily PRN Rommel Rung, DO      sodium chloride  100 mL/hr Intravenous Continuous Jurgen Lujan  mL/hr (04/05/21 1114)        Today, Patient Was Seen By: Jurgen Lujan MD    ** Please Note: Dictation voice to text software may have been used in the creation of this document   **

## 2021-04-05 NOTE — PROGRESS NOTES
Due to the patients difficulty standing, we are allowed to do lying and sitting Ortho pressures   Per provider

## 2021-04-05 NOTE — ASSESSMENT & PLAN NOTE
Noted to have orthostatic hypotension upon PT evaluation  Unclear if that contributed to her fall  Continue with IV hydration  Continue to follow orthostatic vitals

## 2021-04-05 NOTE — ASSESSMENT & PLAN NOTE
Status post fall  X-rays with left wrist distal radius nondisplaced fracture  Currently has splint in place  Continue with nonweightbearing and ambulate with a platform walker  Seen by orthopedics  Non operative management    Close outpatient orthopedic follow-up on discharge

## 2021-04-05 NOTE — CASE MANAGEMENT
LOS: 2 GMLOS: N/A RISK OF READMISSION: N/A BUNDLE: NO    CM met with pt along with Ame Mckee, [facility] liaison  Pt called her dtr Magali Apgar to include her in the conversation  Pt lives in a ranch home; [address], with her son  Pt's son works full time; dtr states she can stay w/ her after rehab  Dtr also works full time but can accommodate a w//c for her if needed  ADL's are normally completed independently with no DME use  Dtr asking about DME for at home; CM advised her a CM at rehab will assist   Ame Mckee presented dtr and pt with information about the [facility]  Pt's dtr is an OT and understands what care her mom is going to require  Pt and dtr both agreeable to [facility] starting the authorization process  CM will advise when a determination has been made  A post acute care recommendation was made by your care team for Acute Rehab  Discussed [facility] with both patient and POA  List of facilities given to both patient and POA via in person  both patient and POA aware the list is custom filtered for them by preference  and that [facility] post acute providers are designated  CM reviewed d/c planning process including the following: identifying help at home, patient preference for d/c planning needs, Discharge Lounge, [facility] Meds to Bed program, availability of treatment team to discuss questions or concerns patient and/or family may have regarding understanding medications and recognizing signs and symptoms once discharged  CM also encouraged patient to follow up with all recommended appointments after discharge  Patient advised of importance for patient and family to participate in managing patients medical well being

## 2021-04-05 NOTE — ASSESSMENT & PLAN NOTE
Comminuted, nondisplaced RT  tibial plateau fracture  Plan for conservative treatment and non operative management  Currently has a right knee immobilizer in place  Patient will be fitted with a TROM brace right knee locked in extension  Patient should maintain nonweightbearing onright leg and left wrist   Ambulate using a  platform walker on the left side so she can bear weight through her elbow in the sugar-tong splint  Continue with ice and analgesics as needed  · Patient seen by Physical therapy and recommended rehabilitation  Physiatry consultation will be obtained

## 2021-04-05 NOTE — PLAN OF CARE
Problem: Prexisting or High Potential for Compromised Skin Integrity  Goal: Skin integrity is maintained or improved  Description: INTERVENTIONS:  - Identify patients at risk for skin breakdown  - Assess and monitor skin integrity  - Assess and monitor nutrition and hydration status  - Monitor labs   - Assess for incontinence   - Turn and reposition patient  - Assist with mobility/ambulation  - Relieve pressure over bony prominences  - Avoid friction and shearing  - Provide appropriate hygiene as needed including keeping skin clean and dry  - Evaluate need for skin moisturizer/barrier cream  - Collaborate with interdisciplinary team   - Patient/family teaching  - Consider wound care consult   Outcome: Progressing     Problem: DISCHARGE PLANNING - CARE MANAGEMENT  Goal: Discharge to post-acute care or home with appropriate resources  Description: INTERVENTIONS:  - Conduct assessment to determine patient/family and health care team treatment goals, and need for post-acute services based on payer coverage, community resources, and patient preferences, and barriers to discharge  - Address psychosocial, clinical, and financial barriers to discharge as identified in assessment in conjunction with the patient/family and health care team  - Arrange appropriate level of post-acute services according to patients   needs and preference and payer coverage in collaboration with the physician and health care team  - Communicate with and update the patient/family, physician, and health care team regarding progress on the discharge plan  - Arrange appropriate transportation to post-acute venues  Outcome: Progressing     Problem: NEUROSENSORY - ADULT  Goal: Achieves stable or improved neurological status  Description: INTERVENTIONS  - Monitor and report changes in neurological status  - Monitor vital signs such as temperature, blood pressure, glucose, and any other labs ordered   - Initiate measures to prevent increased intracranial pressure  - Monitor for seizure activity and implement precautions if appropriate      Outcome: Progressing  Goal: Achieves maximal functionality and self care  Description: INTERVENTIONS  - Monitor swallowing and airway patency with patient fatigue and changes in neurological status  - Encourage and assist patient to increase activity and self care     - Encourage visually impaired, hearing impaired and aphasic patients to use assistive/communication devices  Outcome: Progressing     Problem: SKIN/TISSUE INTEGRITY - ADULT  Goal: Skin integrity remains intact  Description: INTERVENTIONS  - Identify patients at risk for skin breakdown  - Assess and monitor skin integrity  - Assess and monitor nutrition and hydration status  - Monitor labs (i e  albumin)  - Assess for incontinence   - Turn and reposition patient  - Assist with mobility/ambulation  - Relieve pressure over bony prominences  - Avoid friction and shearing  - Provide appropriate hygiene as needed including keeping skin clean and dry  - Evaluate need for skin moisturizer/barrier cream  - Collaborate with interdisciplinary team (i e  Nutrition, Rehabilitation, etc )   - Patient/family teaching  Outcome: Progressing  Goal: Incision(s), wounds(s) or drain site(s) healing without S/S of infection  Description: INTERVENTIONS  - Assess and document risk factors for skin impairment   - Assess and document dressing, incision, wound bed, drain sites and surrounding tissue  - Consider nutrition services referral as needed  - Oral mucous membranes remain intact  - Provide patient/ family education  Outcome: Progressing     Problem: MUSCULOSKELETAL - ADULT  Goal: Maintain or return mobility to safest level of function  Description: INTERVENTIONS:  - Assess patient's ability to carry out ADLs; assess patient's baseline for ADL function and identify physical deficits which impact ability to perform ADLs (bathing, care of mouth/teeth, toileting, grooming, dressing, etc )  - Assess/evaluate cause of self-care deficits   - Assess range of motion  - Assess patient's mobility  - Assess patient's need for assistive devices and provide as appropriate  - Encourage maximum independence but intervene and supervise when necessary  - Involve family in performance of ADLs  - Assess for home care needs following discharge   - Consider OT consult to assist with ADL evaluation and planning for discharge  - Provide patient education as appropriate  Outcome: Progressing  Goal: Maintain proper alignment of affected body part  Description: INTERVENTIONS:  - Support, maintain and protect limb and body alignment  - Provide patient/ family with appropriate education  Outcome: Progressing     Problem: PAIN - ADULT  Goal: Verbalizes/displays adequate comfort level or baseline comfort level  Description: Interventions:  - Encourage patient to monitor pain and request assistance  - Assess pain using appropriate pain scale  - Administer analgesics based on type and severity of pain and evaluate response  - Implement non-pharmacological measures as appropriate and evaluate response  - Consider cultural and social influences on pain and pain management  - Notify physician/advanced practitioner if interventions unsuccessful or patient reports new pain  Outcome: Progressing     Problem: INFECTION - ADULT  Goal: Absence or prevention of progression during hospitalization  Description: INTERVENTIONS:  - Assess and monitor for signs and symptoms of infection  - Monitor lab/diagnostic results  - Monitor all insertion sites, i e  indwelling lines, tubes, and drains  - Monitor endotracheal if appropriate and nasal secretions for changes in amount and color  - [place] appropriate cooling/warming therapies per order  - Administer medications as ordered  - Instruct and encourage patient and family to use good hand hygiene technique  - Identify and instruct in appropriate isolation precautions for identified infection/condition  Outcome: Progressing  Goal: Absence of fever/infection during neutropenic period  Description: INTERVENTIONS:  - Monitor WBC    Outcome: Progressing     Problem: DISCHARGE PLANNING  Goal: Discharge to home or other facility with appropriate resources  Description: INTERVENTIONS:  - Identify barriers to discharge w/patient and caregiver  - Arrange for needed discharge resources and transportation as appropriate  - Identify discharge learning needs (meds, wound care, etc )  - Arrange for interpretive services to assist at discharge as needed  - Refer to Case Management Department for coordinating discharge planning if the patient needs post-hospital services based on physician/advanced practitioner order or complex needs related to functional status, cognitive ability, or social support system  Outcome: Progressing     Problem: Knowledge Deficit  Goal: Patient/family/caregiver demonstrates understanding of disease process, treatment plan, medications, and discharge instructions  Description: Complete learning assessment and assess knowledge base  Interventions:  - Provide teaching at level of understanding  - Provide teaching via preferred learning methods  Outcome: Progressing     Problem: Potential for Falls  Goal: Patient will remain free of falls  Description: INTERVENTIONS:  - Assess patient frequently for physical needs  -  Identify cognitive and physical deficits and behaviors that affect risk of falls    -  [place] fall precautions as indicated by assessment   - Educate patient/family on patient safety including physical limitations  - Instruct patient to call for assistance with activity based on assessment  - Modify environment to reduce risk of injury  - Consider OT/PT consult to assist with strengthening/mobility  Outcome: Progressing

## 2021-04-05 NOTE — PHYSICAL THERAPY NOTE
PHYSICAL THERAPY EVALUATION          Patient Name: Marc Graham  ABNIH'W Date: 4/5/2021   Orthotic Fit: [id number]  PT EVALUATION: [id number]    63 y o     [id number]    Head injury [S09 90XA]  Right knee pain [M25 561]  Forehead abrasion [S00 81XA]  Knee abrasion, right, initial encounter [S80 211A]  Wrist sprain, left, initial encounter [S63 502A]  Ambulatory dysfunction [R26 2]  Closed fracture of right tibial plateau, initial encounter [S82 141A]  Head injury with loss of consciousness ([facility] [address] ) [D46 5K3G]    Past Medical History:   Diagnosis Date    Disease of thyroid gland      Past Surgical History:   Procedure Laterality Date    GASTRIC BYPASS      HERNIA REPAIR          04/05/21 1013   PT Last Visit   PT Visit Date 04/05/21   Note Type   Note type Evaluation   Pain Assessment   Pain Assessment Tool 0-10   Pain Score Worst Possible Pain   Pain Location/Orientation Orientation: Right;Location: Leg   Hospital Pain Intervention(s) Repositioned; Ambulation/increased activity; Elevated; Emotional support; Rest  (declining ice)   Multiple Pain Sites Yes   Pain 2   Pain Score 2 8   Pain Location/Orientation 2 Orientation: Left; Other (Comment)  (wrist)   Hospital Pain Intervention(s) 2 Cold applied;Repositioned; Ambulation/increased activity; Elevated; Emotional support; Rest   Home Living   Type of [address] [place] [address] One level;Stairs to enter without rails   Bathroom Shower/Tub Tub/shower unit   [employer] Standard   Additional Comments 2 MICHELLE  Prior Function   Level of [place] Independent with ADLs and functional mobility   Lives With Son  (adult son)   ADL Assistance Independent   IADLs Independent   Falls in the last 6 months 1 to 4  (1 related to admission)   Vocational Full time employment  (two full time jobs)   Comments pt indep pta without use of an AD  +   reports local family however both children work days/ full time   Restrictions/Precautions   Weight Bearing Precautions Per Order Yes   ADAN Weight Bearing Per Order NWB  (distally at wrist; appropriate for platform walker per ortho)   RLE Weight Bearing Per Order NWB   Braces or Orthoses LE Immobilizer;Splint  (HKB locked in extension RLE; long splint LUE)   Other Precautions WBS; Fall Risk;Pain  (ortho restrictions)   General   Additional Pertinent History pt admitted 4/3/21 for ambulatory dysfunction  activity as tolerated orders  per ortho, NWB LUE at the wrist w sugar tong splint, NWB RLE w HKB locked in extension  Cognition   Overall Cognitive Status WFL   Arousal/Participation Cooperative   Orientation Level Oriented X4   Memory Within functional limits   Following Commands Follows all commands and directions without difficulty   RLE Assessment   RLE Assessment X  (2- hip abd/add and ankle d/t pain)   LLE Assessment   LLE Assessment WFL  (4/5)   Coordination   Movements are Fluid and Coordinated 0   Coordination and Movement Description antalgic   Sensation WFL   Light Touch   RLE Light Touch Grossly intact   LLE Light Touch Grossly intact   Bed Mobility   Supine to Sit 3  Moderate assistance   Additional items Assist x 2;Bedrails;HOB elevated; Increased time required;Verbal cues;LE management   Sit to Supine 3  Moderate assistance   Additional items Assist x 2; Increased time required;Verbal cues;LE management   Additional Comments A for RLE and trunk support  cues for technique   Transfers   Sit to Stand 3  Moderate assistance   Additional items Assist x 2; Increased time required;Verbal cues; Other  (platform RW; bed elevated)   Stand to Sit 3  Moderate assistance   Additional items Assist x 2; Increased time required;Verbal cues; Other  (platform walker)   Ambulation/Elevation   Gait pattern Not tested  (d/t poor standing tolerance)   Balance   Static Sitting Fair   Dynamic Sitting Fair -   Static Standing Poor +   Dynamic Standing Poor   Endurance Deficit   Endurance Deficit Yes   Endurance Deficit Description pain, hypotension w positional changes  vitals during session: 109/66 EOB pre standing, 84/72 seated post standing, 119/67 supine end of session   Activity Tolerance   Activity Tolerance Patient limited by pain;Treatment limited secondary to medical complications (Comment); Other (Comment)  (hypotension, WBS)   Medical Staff Made Aware Luke LENTZ; Gabo Blair OTS   Nurse Made Aware Emily RN   Assessment   Prognosis Good   Problem List Decreased strength;Orthopedic restrictions;Pain;Decreased range of motion;Decreased endurance; Impaired balance;Decreased mobility;Obesity   Assessment Bolivar López is a 63 y o  female admitted to [facility] on 4/3/2021 for Ambulatory dysfunction  Per ortho note 4/5/21: "Right knee nondisplaced tibial plateau fracture  Left wrist distal radius nondisplaced fracture  Continue conservative treatment for both the right knee and left wrist " PT was consulted and pt was seen on 4/5/2021 for mobility assessment and d/c planning  Pt presents NWB L wrist w splint, NWB RLE in HKB locked in extension, high fall risk  Pt is currently functioning at a moderate assistance x2 level for bed mobility, moderate assistance x2 level for transfers  Also mod Ax1-2 to laterally scoot along EOB 2'  Pt demonstrated good understanding of NWB LUE and RLE  Agreeable to HKB at this time  Brace fit in supine w no reported change in pain with brace on  However did experience some discomfort with repositioning of RLE  OT present to help with repositioning/support of RLE when brace being fit  Demonstrated good compliance w NWB L wrist during bed mobility  Able to stand w LUE on platform RW and RUE pushing from EOB w bed height slightly elevated, Ax1 to support stand from the hips/trunk and RLE supported Ax1 for comfort  Upon standing pt w increase in pain  Continue to require Ax1 to support RLE  Tolerated standing about 20" prior to needing to sit d/t pain and onset of dizziness   Vital signs did show drop in BP w positional changes; improved w return to supine positioning  Depending on progress in therapy (compliance w WBS, standing endurance, ambulatory balance), would also consider trial of slide board transf and wc mobility vs hopping w platform RW  Pt will benefit from continued skilled IP PT to address the above mentioned impairments  in order to maximize recovery and increase functional independence when completing mobility and ADLs  At this time PT recommendations for d/c are STR given environmental and social barriers, decline in function secondary to abovementioned factors, need for further mobility training d/t change in WBS, high fall risk  Barriers to Discharge Inaccessible home environment;Decreased caregiver support   Barriers to Discharge Comments keyona, +home alone   Goals   Patient Goals to be able to be indep again and go home   STG Expiration Date 04/19/21   Short Term Goal #1 1)  Pt will perform bed mobility with S demonstrating appropriate technique 100% of the time in order to improve function  2)  Perform all transfers (sit<>stand, pivot, slide board) with min A demonstrating safe and appropriate technique 100% of the time in order to improve ability to negotiate safely in home environment  3) PT for ambulation when appropriate  4)  Improve overall strength and balance 1/2 grade in order to optimize ability to perform functional tasks and reduce fall risk  5) Increase activity tolerance to 45 minutes in order to improve endurance to functional tasks  6) wc mobility >50' at mod I level to demonstrate ability to negotiate household distances  7) PT for ongoing patient and family/caregiver education, DME needs and d/c planning in order to promote highest level of function in least restrictive environment  8) Maintain WBS of LUE and % during functional tasks  PT Treatment Day 1   Plan   Treatment/Interventions Functional transfer training;LE strengthening/ROM; Elevations; Therapeutic exercise;Patient/family training;Equipment eval/education; Bed mobility; Compensatory technique education;Continued evaluation;Spoke to nursing;OT;Gait training  (wc mobility)   PT Frequency Other (Comment)  (5-7x)   Recommendation   PT Discharge Recommendation Post-Acute Rehabilitation Services   Equipment Recommended   (will continue to evaluate DME needs)   PT - OK to Discharge Yes   Additional Comments when medically stable   AM-PAC Basic Mobility Inpatient   Turning in Bed Without Bedrails 1   Lying on Back to Sitting on Edge of Flat Bed 1   Moving Bed to Chair 1   Standing Up From Chair 1   Walk in Room 1   Climb 3-5 Stairs 1   Basic Mobility Inpatient Raw Score 6   Turning Head Towards Sound 4   Follow Simple Instructions 4   Low Function Basic Mobility Raw Score 14   Low Function Basic Mobility Standardized Score 22 01   History: co - morbidities, social background, fall risk, WBS/ ortho restricitions  Exam: impairments in systems including musculoskeletal (strength), neuromuscular (balance, transfers, motor function and sensation), AM-PAC, joint integrity, cardiopulmonary, cognition  Clinical: unstable/unpredictable; hypotension, discharge planning, therapy needs  Complexity:high      Jesús Bryan, PT     The patient's AM-PAC Basic Mobility Inpatient Short Form Low Function Raw Score 14 , Standardized Score is 22 01  A standardized score less 42 9 suggests the patient may benefit from discharge to post-acute rehab services  Please also refer to the recommendation of the Physical Therapist for safe discharge planning

## 2021-04-05 NOTE — OCCUPATIONAL THERAPY NOTE
Occupational Therapy Evaluation (Time=0940-1014)     Patient Name: Guy Sommer  DVADRIANY'Q Date: 4/5/2021  Problem List  Principal Problem:    Tibial plateau fracture  Active Problems:    Ambulatory dysfunction    Hypothyroidism    GERD (gastroesophageal reflux disease)    Left wrist distal radius nondisplaced fracture    Obesity    Dizziness    Past Medical History  Past Medical History:   Diagnosis Date    Disease of thyroid gland      Past Surgical History  Past Surgical History:   Procedure Laterality Date    GASTRIC BYPASS      HERNIA REPAIR          04/05/21 1014   OT Last Visit   OT Visit Date 04/05/21   Note Type   Note type Evaluation   Restrictions/Precautions   Weight Bearing Precautions Per Order Yes   LUE Weight Bearing Per Order NWB  (NWB L wrist; allowed use of platform walker)   RLE Weight Bearing Per Order NWB   Braces or Orthoses LE Immobilizer;Splint  (sugar-tong splint to LUE; TROM locked in extensions)   Other Precautions WBS; Fall Risk;Pain  (Orthostatic Blood pressure)   Pain Assessment   Pain Assessment Tool 0-10   Pain Score Worst Possible Pain   Pain Location/Orientation Orientation: Right;Location: Leg   Hospital Pain Intervention(s) Repositioned; Emotional support;Medication (See MAR)   Multiple Pain Sites Yes   Pain 2   Pain Score 2 8   Pain Location/Orientation 2 Orientation: Left  (wrist)   Hospital Pain Intervention(s) 2 Cold applied;Repositioned; Ambulation/increased activity; Emotional support   Home Living   Type of [address] [place] [address] One level;Performs ADLs on one level; Able to live on main level with bedroom/bathroom;Stairs to enter without rails  (2 keyona without rails)   Bathroom Shower/Tub Tub/shower unit   Bathroom Toilet Standard   Bathroom Equipment   (States having none)   Home Equipment   (States having no DME)   Prior Function   Level of [place] Independent with ADLs and functional mobility   Lives With Son  (Son works throughout the day)   Receives Help From Family   ADL Assistance Independent   IADLs Independent   Falls in the last 6 months 1 to 4  (1)   Vocational Full time employment  (Two full-time jobs (works at [employer] and [employer]))   Lifestyle   Autonomy PTA, pt states independence with all aspects of her ADLs, transfers, ambulation with no AD; + falls=1, +, + home alone  Reciprocal Relationships Supportive Son   Service to Others Works at [email or web address] and [employer] in [employer] [facility] Getting good sleep   Psychosocial   Psychosocial (WDL) WDL   Subjective   Subjective "I am open to therapy, I need it "   ADL   Where Assessed Edge of bed   Eating Assistance 6  Modified independent   Grooming Assistance 6  Modified Independent   UB Bathing Assistance 4  Minimal Assistance   LB [address] 1  [address] 4  [address] 1  [address]  2  Maximal Assistance   Bed Mobility   Rolling R 3  Moderate assistance   Additional items Increased time required;Verbal cues;LE management;Assist x 2   Supine to Sit 3  Moderate assistance   Additional items Assist x 2; Increased time required;LE management;Verbal cues   Sit to Supine 3  Moderate assistance   Additional items Assist x 2; Increased time required;Verbal cues;LE management   Transfers   Sit to Stand 3  Moderate assistance   Additional items Assist x 2; Increased time required;Verbal cues  (Platform RW, bed elevated)   Stand to Sit 3  Moderate assistance   Additional items Assist x 2; Increased time required;Verbal cues  (LE management; platform RW)   Other 3  Moderate assistance   Additional items Assist x 2  (Lateral scoots;  A for LE management and trunk support)   Additional Comments BP=99/62 (1st EOB), 109/66 (2nd EOB post leg pumps), 84/72 (post stand at bedside), 119/67 (supine post activity); HR=82-94 bpm; SpO2=99% on RA; nsg aware   Functional Mobility   Functional Mobility   (Limited assessment 2* pain and hypotension)   Additional Comments Pt was able to stand at bedside c platform walker while adhering to WBS precautions for 45-60 sec  Balance   Static Sitting Fair   Dynamic Sitting Fair -   Static Standing Poor +   Dynamic Standing Poor   Activity Tolerance   Activity Tolerance Patient limited by fatigue;Patient limited by pain;Treatment limited secondary to medical complications (Comment)  (hypotension, NWB status)   Medical Staff Made Aware TRINITY [place]; RN Chau Ruibn   Nurse Made Aware Yes   RUE Assessment   RUE Assessment WFL   RUE Strength   RUE Overall Strength Within Functional Limits - strength 5/5   LUE Assessment   LUE Assessment X  (Shr AROM=WFL;elbow/wrist=NT,actively moving digits)   LUE Strength   LUE Overall Strength Other (Comment)  (Shr strength= 4+/5 2* distal pain; elbow=NT)   Hand Function   Gross Motor Coordination Impaired  (R= WFL; L=limited 2* radial fx/pain)   Fine Motor Coordination Impaired  (R= WFL; L=limited 2* radial fx/pain)   Sensation   Light Touch No apparent deficits   Proprioception   Proprioception No apparent deficits   Vision-Basic Assessment   Current Vision Wears glasses all the time   Vision - Complex Assessment   Acuity Able to read clock/calendar on wall without difficulty   Perception   Inattention/Neglect Appears intact   Cognition   Overall Cognitive Status WFL   Arousal/Participation Alert; Responsive; Cooperative   Attention Within functional limits   Orientation Level Oriented X4   Memory Within functional limits   Following Commands Follows all commands and directions without difficulty   Assessment   Limitation Decreased ADL status; Decreased UE strength;Decreased Safe judgement during ADL;Decreased cognition;Decreased endurance;Decreased self-care trans;Decreased high-level ADLs  (NWB LUE & RLE)   Prognosis Good   Assessment Pt is a 63 y o  female admitted to [facility]  on 4/3/21 with c/o L wrist pain and R LE pain after falling out of her car and hitting her head on the curb c brief LOC  Pt noted with ambulatory dysfunction c head injury (forehead abrasion) CT head (-), L distal radial fx, and R tibial plateau fx  Pt is currently NWB L wrist w sugar-tong splint, NWB RLE in hinged knee brace locked in extension and is a high falls risk  Pt is able to utilize platform walker for OOB mobility per Ortho  Pt PMH includes Gastric bypass, hypothyroidism, and GERD  At baseline PTA, pt states independence with all aspects of her ADLs, transfers, ambulation with no AD; + falls=1, +, + home alone  During evaluation of functional mobility, pt demonstrated mod A x2 c sit<>stand transfers using platform RW with cueing throughout for safety and positioning  Pt had dizziness, increased pain, and hypotension when standing  BP taken and reported to ns  Limited functional mobility assessment 2* hypotension and pain  For the initial eval, pt demonstrated deficits c activity tolerance (currently fair, 10-15 min), functional mobility, functional balance, ADL status, L UE strength, and transfer safety  Pt would benefit from continued OT tx to improve their level of independence and address the above deficits, 3-5xwk/1-2 wks  Currently recommend D/C to STR when medically appropriate  OT will continue to follow pt on OT caseload with the following goals     Goals   Patient Goals To maximize independence and return home   STG Time Frame   (1-7)   Short Term Goal #1 Pt will independently identify 2-3 fall risks and their appropriate corrective actions to decrease falls risk in their home environment   Short Term Goal #2 Pt will improve functional mobility to min A for ADL/IADL/leisure tasks with platform walker demonstrating G balance/safe technique/adherence to WBS   Short Term Goal  Pt will demonstrate safe transfer technique and adherence to WBS when transferring between all room surfaces (i e  bed, recliner, toilet) 100% of the time   LTG Time Frame   (7-14) Long Term Goal #1 Pt will demonstrate improved activity tolerance to G (20-30 minutes) with 1-2 rest breaks throughout and standing tolerance for 5-7 minutes while adhering to NWB status for improved engagement with functional tasks  Long Term Goal #2 Pt will demonstrate improved functional balance by 1 grade to assist with ADLs   Long Term Goal Pt will complete all UE and LE bathing/dressing ADL activities with Mod I   Plan   Treatment Interventions ADL retraining;Functional transfer training;UE strengthening/ROM; Endurance training;Cognitive reorientation;Patient/family training;Equipment evaluation/education; Compensatory technique education; Fine motor coordination activities; Energy conservation; Activityengagement;Continued evaluation   Goal Expiration Date 04/19/21   OT Treatment Day 0   OT Frequency 3-5x/wk   Recommendation   OT Discharge Recommendation Post-Acute Rehabilitation Services   OT - OK to Discharge Yes  (recommend d/c to STR when medically appropriate)   AM-PAC Daily Activity Inpatient   Lower Body Dressing 1   Bathing 2   Toileting 2   Upper Body Dressing 2   Grooming 3   Eating 4   Daily Activity Raw Score 14   Daily Activity Standardized Score (Calc for Raw Score >=11) 33 39   AM-PAC Applied Cognition Inpatient   Following a Speech/Presentation 4   Understanding Ordinary Conversation 4   Taking Medications 4   Remembering Where Things Are Placed or Put Away 4   Remembering List of 4-5 Errands 3   Taking Care of Complicated Tasks 3   Applied Cognition Raw Score 22   Applied Cognition Standardized Score 47 83   Kaylee Dust

## 2021-04-05 NOTE — CASE MANAGEMENT
CM notified by CNA that pt's dtr would like to speak with CM  CM met with pt at bedside  SYLVAIN spoke with Myesha Galvin and she is interested in a referral being sent to [facility] for she works for them  CM submitted a referral  CM will advise if they are able to accept

## 2021-04-06 LAB
ANION GAP SERPL CALCULATED.3IONS-SCNC: 8 MMOL/L (ref 4–13)
BASOPHILS # BLD AUTO: 0.03 THOUSANDS/ΜL (ref 0–0.1)
BASOPHILS NFR BLD AUTO: 1 % (ref 0–1)
BUN SERPL-MCNC: 14 MG/DL (ref 5–25)
CALCIUM SERPL-MCNC: 9 MG/DL (ref 8.3–10.1)
CHLORIDE SERPL-SCNC: 106 MMOL/L (ref 100–108)
CO2 SERPL-SCNC: 27 MMOL/L (ref 21–32)
CREAT SERPL-MCNC: 0.58 MG/DL (ref 0.6–1.3)
EOSINOPHIL # BLD AUTO: 0.12 THOUSAND/ΜL (ref 0–0.61)
EOSINOPHIL NFR BLD AUTO: 3 % (ref 0–6)
ERYTHROCYTE [DISTWIDTH] IN BLOOD BY AUTOMATED COUNT: 13.1 % (ref 11.6–15.1)
GFR SERPL CREATININE-BSD FRML MDRD: 103 ML/MIN/1.73SQ M
GLUCOSE SERPL-MCNC: 97 MG/DL (ref 65–140)
HCT VFR BLD AUTO: 35.8 % (ref 34.8–46.1)
HGB BLD-MCNC: 11.5 G/DL (ref 11.5–15.4)
IMM GRANULOCYTES # BLD AUTO: 0.01 THOUSAND/UL (ref 0–0.2)
IMM GRANULOCYTES NFR BLD AUTO: 0 % (ref 0–2)
LYMPHOCYTES # BLD AUTO: 1.44 THOUSANDS/ΜL (ref 0.6–4.47)
LYMPHOCYTES NFR BLD AUTO: 35 % (ref 14–44)
MCH RBC QN AUTO: 26.2 PG (ref 26.8–34.3)
MCHC RBC AUTO-ENTMCNC: 32.1 G/DL (ref 31.4–37.4)
MCV RBC AUTO: 82 FL (ref 82–98)
MONOCYTES # BLD AUTO: 0.5 THOUSAND/ΜL (ref 0.17–1.22)
MONOCYTES NFR BLD AUTO: 12 % (ref 4–12)
NEUTROPHILS # BLD AUTO: 2.06 THOUSANDS/ΜL (ref 1.85–7.62)
NEUTS SEG NFR BLD AUTO: 49 % (ref 43–75)
NRBC BLD AUTO-RTO: 0 /100 WBCS
PLATELET # BLD AUTO: 121 THOUSANDS/UL (ref 149–390)
PMV BLD AUTO: 11.8 FL (ref 8.9–12.7)
POTASSIUM SERPL-SCNC: 4.2 MMOL/L (ref 3.5–5.3)
RBC # BLD AUTO: 4.39 MILLION/UL (ref 3.81–5.12)
SODIUM SERPL-SCNC: 141 MMOL/L (ref 136–145)
WBC # BLD AUTO: 4.16 THOUSAND/UL (ref 4.31–10.16)

## 2021-04-06 PROCEDURE — 80048 BASIC METABOLIC PNL TOTAL CA: CPT | Performed by: INTERNAL MEDICINE

## 2021-04-06 PROCEDURE — 85025 COMPLETE CBC W/AUTO DIFF WBC: CPT | Performed by: INTERNAL MEDICINE

## 2021-04-06 PROCEDURE — 99232 SBSQ HOSP IP/OBS MODERATE 35: CPT | Performed by: STUDENT IN AN ORGANIZED HEALTH CARE EDUCATION/TRAINING PROGRAM

## 2021-04-06 PROCEDURE — 97110 THERAPEUTIC EXERCISES: CPT

## 2021-04-06 RX ADMIN — OXYCODONE HYDROCHLORIDE 5 MG: 5 TABLET ORAL at 05:05

## 2021-04-06 RX ADMIN — DOCUSATE SODIUM 100 MG: 100 CAPSULE, LIQUID FILLED ORAL at 08:32

## 2021-04-06 RX ADMIN — LEVOTHYROXINE SODIUM 175 MCG: 100 TABLET ORAL at 05:05

## 2021-04-06 RX ADMIN — DOCUSATE SODIUM 100 MG: 100 CAPSULE, LIQUID FILLED ORAL at 21:52

## 2021-04-06 RX ADMIN — PANTOPRAZOLE SODIUM 20 MG: 20 TABLET, DELAYED RELEASE ORAL at 05:05

## 2021-04-06 RX ADMIN — ENOXAPARIN SODIUM 40 MG: 40 INJECTION SUBCUTANEOUS at 08:32

## 2021-04-06 RX ADMIN — OXYCODONE HYDROCHLORIDE 5 MG: 5 TABLET ORAL at 21:53

## 2021-04-06 NOTE — PLAN OF CARE
Problem: PHYSICAL THERAPY ADULT  Goal: Performs mobility at highest level of function for planned discharge setting  See evaluation for individualized goals  Description: Treatment/Interventions: Functional transfer training, LE strengthening/ROM, Elevations, Therapeutic exercise, Patient/family training, Equipment eval/education, Bed mobility, Compensatory technique education, Continued evaluation, Spoke to nursing, OT, Gait training(wc mobility)  Equipment Recommended: (will continue to evaluate DME needs)       See flowsheet documentation for full assessment, interventions and recommendations  Note: Prognosis: Good  Problem List: Decreased strength, Orthopedic restrictions, Pain, Decreased range of motion, Decreased endurance, Impaired balance, Decreased mobility, Obesity  Assessment: Cris was seen for a f/u session per PT POC  Bed level session given hypotension and RLE pain w activity  Tolerated supine therex well on LLE  End of session pt repositioned in bed, all needs in reach and sister present  Ice to L wrist and elevated on pillow  RLE elevated on pillow w HKB on    Barriers to Discharge: Inaccessible home environment, Decreased caregiver support  Barriers to Discharge Comments: keyona, +home alone  PT Discharge Recommendation: [phone]     PT - OK to Discharge: Yes    See flowsheet documentation for full assessment

## 2021-04-06 NOTE — CASE MANAGEMENT
CM notified by [facility] that they do not have any beds available at this time  CM called pt's dtr to make her aware  She is agreeable to [facility] who does have a bed today  ARC to initiate auth  CM will advise when a determination has been made

## 2021-04-06 NOTE — PHYSICAL THERAPY NOTE
PHYSICAL THERAPY NOTE          Patient Name: Tere Sotelo  OGBSI'O Date: 4/6/2021   Time: 3293-2107       04/06/21 1500   PT Last Visit   PT Visit Date 04/06/21   Note Type   Note Type Treatment   Pain Assessment   Pain Assessment Tool 0-10   Pain Score 3   Pain Location/Orientation Orientation: Left; Other (Comment)  (wrist)   Hospital Pain Intervention(s) Cold applied;Repositioned;Elevated   Pain 2   Pain Score 2 8   Pain Location/Orientation 2 Orientation: Right;Location: Leg   Hospital Pain Intervention(s) 2 Rest   Restrictions/Precautions   LUE Weight Bearing Per Order NWB  (wrist)   RLE Weight Bearing Per Order NWB   Braces or Orthoses LE Immobilizer;Splint  (HKB locked in extension RLE; sugar tong splint LUE)   Other Precautions WBS; Fall Risk;Pain   General   Chart Reviewed Yes   Response to Previous Treatment Patient with no complaints from previous session  Family/Caregiver Present Yes  (sister)   Cognition   Overall Cognitive Status WFL   Arousal/Participation Cooperative   Attention Within functional limits   Orientation Level Oriented X4   Memory Within functional limits   Following Commands Follows all commands and directions without difficulty   Subjective   Subjective "What will the goal of rehab be?"   Endurance Deficit   Endurance Deficit No   Activity Tolerance   Activity Tolerance Patient tolerated treatment well;Patient limited by pain;Treatment limited secondary to medical complications (Comment)  (hypotension)   Nurse Made Aware Nano Aguilar RN   Exercises   Heelslides Supine;Left  (2x15)   Hip Flexion Supine;20 reps; Left  (2x10)   Hip Abduction Supine;20 reps; Left  (2x15)   Knee AROM Short Arc Quad Supine;Left  (2x15)   Ankle Pumps Supine;15 reps;Bilateral  (2 sets)   Assessment   Prognosis Good   Problem List Decreased strength;Orthopedic restrictions;Pain;Decreased range of motion;Decreased endurance; Impaired Repeat hemoglobin of 6.6 after pt received 1unit of PRBC. Dr. Rueda notified. Per MD transfuse another unit of PRBC, after transfusion repeat H&H and give 20mg of IV lasix one time. Notify MD if Hgb remains <7. Will continue to monitor.   balance;Decreased mobility;Obesity   Assessment Cris was seen for a f/u session per PT POC  Bed level session given hypotension and RLE pain w activity  Tolerated supine therex well on LLE  End of session pt repositioned in bed, all needs in reach and sister present  Ice to L wrist and elevated on pillow  RLE elevated on pillow w HKB on     Barriers to Discharge Inaccessible home environment;Decreased caregiver support   Barriers to Discharge Comments keyona, +home alone   Goals   Patient Goals go to rehab   STG Expiration Date 04/19/21   Short Term Goal #1 1)  Pt will perform bed mobility with S demonstrating appropriate technique 100% of the time in order to improve function  2)  Perform all transfers (sit<>stand, pivot, slide board) with min A demonstrating safe and appropriate technique 100% of the time in order to improve ability to negotiate safely in home environment  3) PT for ambulation when appropriate  4)  Improve overall strength and balance 1/2 grade in order to optimize ability to perform functional tasks and reduce fall risk  5) Increase activity tolerance to 45 minutes in order to improve endurance to functional tasks  6) wc mobility >50' at mod I level to demonstrate ability to negotiate household distances  7) PT for ongoing patient and family/caregiver education, DME needs and d/c planning in order to promote highest level of function in least restrictive environment  8) Maintain WBS of LUE and % during functional tasks  PT Treatment Day 2   Plan   Treatment/Interventions Functional transfer training;LE strengthening/ROM; Elevations; Therapeutic exercise;Patient/family training;Equipment eval/education; Bed mobility; Compensatory technique education;Continued evaluation;Spoke to nursing;Gait training  (wc mobility)   Progress Slow progress, medical status limitations  (WBS)   PT Frequency Other (Comment)  (5-7x)   Recommendation   PT Discharge Recommendation Post-Acute Rehabilitation Services   PT - OK to Discharge Yes   Additional Comments when medically stable     Librado Loya, PT

## 2021-04-06 NOTE — ASSESSMENT & PLAN NOTE
BP currently soft, noted orthostatic hypotension upon working with PT  Not on any BP meds, stop IV fluids

## 2021-04-06 NOTE — PROGRESS NOTES
PHYSICAL MEDICINE AND REHABILITATION   PREADMISSION ASSESSMENT     Projected [facility] Diagnoses:  Impairment of mobility, safety and Activities of Daily Living (ADLs) due to Orthopedic Disorders:  08 9  Other Orthopedic Right Tibial Plateau Fracture, L Distal Radius Fracture   Etiologic: Tibial Plateau Fracture, Wrist Fracture   Date of Onset: 4/3  Date of surgery:     PATIENT INFORMATION  Name: Stevie Gates Phone #: [phone] (home)   Address: [address] [place] [address]  YOB: 1965 Age: 63 y o  SS#   Marital Status: /Civil Union  Ethnicity:   Employment Status: currently employed was working 2 jobs prior to injury   Extended Emergency Contact Information  Primary Emergency Contact: [address] Phone: [phone]  Relation: Daughter (Daughter is an Occupational Therapist)   Advance Directive: Full Code     INSURANCE/COVERAGE:     Primary Payor: Rayna Lechance / Plan: Thamas Chyle / Product Type: Blue Fee for Service /   Secondary Payer:<NONE>   Payer Contact:  Payer Contact:   Contact Phone:  Contact Phone:     Authorization #:   Coverage Dates: 4/7/2021- 4/13/2021  LCD: 4/13/21 (update due)   MEDICARE #: NA  Medicare Days: NA   Medical Record #: [id number]     As of 4/7 patient has deductible of $1,350 00/ $1,347 61 has been MET  Patient has Out of Pocket MAX of $5,400 00, [id number] is remaining for 2021  This does not include billing from recent acute care hospital stay            REFERRAL SOURCE:   Referring provider: Kaylee Hogan MD  Referring facility: [address] [place] [address]   Room: [place] [address] /E2 -*  PCP: Jhon Preciado MD PCP phone number: [phone]    MEDICAL INFORMATION  HPI:   Stevie Gates is a 63 y o  female with PMH of hypothyroidism, GERD, who recently fell while getting out of her care in which she apparently fell on her face and hands, hitting her head with brief LOC followed by pain in L wrist and R leg who presented to hospital unable to ambulate  CT head showed mild frontal/forehead hematoma without acute intracranial findings  Imaging also revealed R comminuted nondisplaced tibial plateau fracture and left distal radius fracture  Orthopedics consulted and recommended NWB of R leg and L wrist   She is to use sugar tong splint for wrist fracture and TROM brace locked in extension for R leg  Patient may WB thru L elbow and use platform walker for ambulation  Patient recommended ortho follow-up with repeat x-rays in about 1 week (around )  Course also notable for some dizziness and orthostatic hypotension and she received IVF  Patient was evaluated by PT and OT , post-acute rehab was recommended  A PM&R consult was also competed by Dr Reginald Schroeder who felt patient is a good candidate for acute in-patient rehabilitation  Patient is medically stable and ready for discharge to [facility] today  COVID-19 Testin/7 - negative     Past Medical History:   Past Surgical History:    Allergies:     Past Medical History:   Diagnosis Date    Disease of thyroid gland     Past Surgical History:   Procedure Laterality Date    GASTRIC BYPASS      HERNIA REPAIR       No Known Allergies      Comorbidities/Surgeries in the last 100 days: Right tibial fracture, Left radial fracture, orthostasis, anxiety, obesity, GERD, hypothyroidism     CURRENT VITAL SIGNS:   Temp:  [98 3 °F (36 8 °C)-98 4 °F (36 9 °C)] 98 4 °F (36 9 °C)  HR:  [83-85] 85  Resp:  [16-17] 16  BP: (121-127)/(60-70) 127/60   Intake/Output Summary (Last 24 hours) at 2021 1245  Last data filed at 2021 0630  Gross per 24 hour   Intake 360 ml   Output 1225 ml   Net -865 ml        LABORATORY RESULTS:      Lab Results   Component Value Date    HGB 11 5 2021    HCT 35 8 2021    WBC 4 16 (L) 2021     Lab Results   Component Value Date    BUN 14 2021    K 4 2 2021     2021    GLUCOSE 110 10/27/2016    CREATININE 0 58 (L) 04/06/2021     No results found for: PROTIME, INR     DIAGNOSTIC STUDIES:  Xr Wrist 3+ Views Left    Result Date: 4/4/2021  Impression: Likely acute, nondisplaced distal radial metaphysis fracture  This is evidenced by cortical step off over the dorsal radial aspect of the bone  Workstation performed: [id number]     Xr Knee 4+ Vw Right Injury    Result Date: 4/4/2021  Impression: Acute, nondisplaced, nondepressed tibial plateau fracture  Workstation performed: [id number]     Ct Head Without Contrast    Result Date: 4/3/2021  Impression: No acute intracranial abnormality  Workstation performed: [id number]     Ct Lower Extremity Wo Contrast Right    Result Date: 4/3/2021  Impression: Comminuted, nondisplaced tibial plateau fracture  The study was marked in [facility] for immediate notification   Workstation performed: [id number]       PRECAUTIONS/SPECIAL NEEDS:  Weight Bearing Precautions:  NWB RLE with knee brace locked in extension, NWB to L wrist with splint, may use platform walker to weightbear through elbow   , Anticoagulation:  Lovonix, Safety Concerns and Pain Management, Fall Risk     MEDICATIONS:     Current Facility-Administered Medications:     acetaminophen (TYLENOL) tablet 650 mg, 650 mg, Oral, Q6H PRN, Barry Tim DO    aluminum-magnesium hydroxide-simethicone (MYLANTA) oral suspension 30 mL, 30 mL, Oral, Q6H PRN, Barry Tim DO    calcium carbonate (TUMS) chewable tablet 1,000 mg, 1,000 mg, Oral, Daily PRN, Barry Tim DO    cyclobenzaprine (FLEXERIL) tablet 5 mg, 5 mg, Oral, TID PRN, Bibiana Marisol, DO, 5 mg at 04/05/21 0951    docusate sodium (COLACE) capsule 100 mg, 100 mg, Oral, BID, Barry Tim DO, 100 mg at 04/07/21 0856    enoxaparin (LOVENOX) subcutaneous injection 40 mg, 40 mg, Subcutaneous, Q24H DELROY, Barry Tim DO, 40 mg at 04/07/21 0858    levothyroxine tablet 175 mcg, 175 mcg, Oral, Early Morning, Barry Tim DO, 175 mcg at 04/07/21 0585   ondansetron (ZOFRAN) injection 4 mg, 4 mg, Intravenous, Q6H PRN, Barry Tim DO    oxyCODONE (ROXICODONE) IR tablet 2 5 mg, 2 5 mg, Oral, Q4H PRN, Barry Tim DO    oxyCODONE (ROXICODONE) IR tablet 5 mg, 5 mg, Oral, Q4H PRN, Barry Tim, , 5 mg at 04/06/21 2153    pantoprazole (PROTONIX) EC tablet 20 mg, 20 mg, Oral, Early Morning, Barry Tim DO, Stopped at 04/07/21 0608    simethicone (MYLICON) chewable tablet 80 mg, 80 mg, Oral, 4x Daily PRN, Barry Tim DO    sodium chloride 0 9 % infusion, 100 mL/hr, Intravenous, Continuous, Honey Weir MD, Stopped at 04/06/21 1105    SKIN INTEGRITY:   Abrasion right forehead    PRIOR LEVEL OF FUNCTION:  She lives in a(n) single family home  [facility]  Self Care: Independent, Indoor Mobility: Independent, Stairs (in/outdoor): Independent and Cognition: Independent    FALLS IN THE LAST 6 MONTHS: 1    HOME ENVIRONMENT:  The living area: can live on one level  There are 3 steps to enter the home  The patient will not have 24 hour supervision/physical assistance available upon discharge  PREVIOUS DME:  Equipment in home (previous DME): None    FUNCTIONAL STATUS:  Physical Therapy Occupational Therapy Speech Therapy   04/07/21 1008    PT Last Visit   PT Visit Date 04/07/21   Note Type   Note Type Treatment   Pain Assessment   Pain Assessment Tool 0-10   Pain Score 5   Pain Location/Orientation Orientation: Right;Location: Leg   Hospital Pain Intervention(s) Ambulation/increased activity;Repositioned;Cold applied   Restrictions/Precautions   Weight Bearing Precautions Per Order Yes   LUE Weight Bearing Per Order NWB   RLE Weight Bearing Per Order NWB   Braces or Orthoses LE Immobilizer;Splint  (HKB locked in extension RLE; sugar tong splint LUE)   General   Chart Reviewed Yes   Response to Previous Treatment Patient reporting fatigue but able to participate     Family/Caregiver Present No   Subjective   Subjective Willing to participate in therapy this AM    Transfers   Sit to Stand 4  Minimal assistance   Additional items Assist x 2;Armrests; Increased time required;Verbal cues   Stand to Sit 4  Minimal assistance   Additional items Assist x 2;Armrests; Increased time required;Verbal cues   Toilet transfer 4  Minimal assistance   Additional items Assist x 2;Armrests; Increased time required;Verbal cues; Commode   Ambulation/Elevation   Gait pattern Decreased foot clearance; Short stride  (hop c RW)   Gait Assistance 4  Minimal assist   Additional items Assist x 2;Verbal cues; Tactile cues; Other (Comment)  (with 3rd present for close chair follow)   Assistive Device Rolling walker;L platform   Distance 5'   Balance   Static Sitting Fair   Dynamic Sitting Fair -   Static Standing Poor +   Dynamic Standing Poor   Ambulatory Poor   Endurance Deficit   Endurance Deficit Yes   Endurance Deficit Description pain/fatigue   Activity Tolerance   Activity Tolerance Patient tolerated treatment well   Medical Staff Made Aware Rena Douglas 78    Nurse Made Aware yes   Exercises   THR Supine;Sitting;10 reps;AAROM; Bilateral   Assessment   Prognosis Good   Problem List Decreased strength;Decreased range of motion;Decreased endurance; Impaired balance;Decreased mobility; Decreased safety awareness; Obesity; Decreased skin integrity;Orthopedic restrictions;Pain   Assessment Pt  seated on bedside commode upon my arrival, with OTR and OTS present  Performance of transfers being able to complete with A of 2 and cues provided for hand placement/technique  Pt  able to maintain appropriate WBing restrictions throughout treatment session  Pt  able to progress with an increased amb  trial with use of RW and A of 2 with cues provided for LE sequencing and 3rd present for a close chair follow  Pt  returned to seated in bedside chair with ice applied and LUE elevated on pillow at end of treatment session   BP measured seated in bedside chair at 129/81, RN aware at end of treatment session  PT will continue to recommend d/c to rehab when medically stable for continued improvement of noted impairments  04/07/21 0900    OT Last Visit   OT Visit Date 04/07/21   Note Type   Note Type Treatment   Restrictions/Precautions   Weight Bearing Precautions Per Order Yes   LUE Weight Bearing Per Order NWB   RLE Weight Bearing Per Order NWB   Braces or Orthoses LE Immobilizer;Splint  (HKB locked in extension RLE; sugar tong splint LUE)   Other Precautions WBS; Fall Risk;Pain   Pain Assessment   Pain Assessment Tool 0-10   Pain Score 5   Pain Location/Orientation Orientation: Right;Location: Leg   Hospital Pain Intervention(s) Repositioned;Cold applied; Emotional support; Ambulation/increased activity   ADL   Where Assessed Chair   Eating Assistance 6  Modified independent   Eating Deficit Increased time to complete   Eating Comments Pt completed beverage management tasks c straw   Grooming Assistance 4  Minimal Assistance   Grooming Deficit Setup; Increased time to complete; Teeth care   Grooming Comments Pt completed brushing hair, brushing teeth, washing face c A for setup including opening toothpaste  UB Bathing Assistance 4  Minimal Assistance   UB Bathing Deficit Increased time to complete;Setup;Right arm  (A c back cleaning/drying)   UB Bathing Comments Pt completed UB bathing c A for back and R arm   LB Bathing Assistance 3  Moderate Assistance   LB Bathing Deficit Left lower leg including foot;Right lower leg including foot; Increased time to complete;Supervision/safety;Setup;Perineal area   LB Bathing Comments Pt completed bathing tasks c A for standing/steadying during pericare  Pt also required A for initial setup of bathing supplies  UB Dressing Assistance 4  Minimal Assistance   UB Dressing Deficit Thread LUE; Increased time to complete;Supervision/safety;Setup;Verbal cueing; Fasteners   UB Dressing Comments Pt completed UE dressing task c A for managing fasteners/tying gown   LB Dressing Assistance 1  Total Assistance   LB Dressing Deficit Don/doff L sock; Increased time to complete;Setup;Verbal cueing;Supervision/safety   Toileting Assistance  4  Minimal Assistance   Toileting Deficit Setup;Steadying;Verbal cueing;Supervison/safety; Increased time to complete; Bedside commode;Clothing management up;Clothing management down   Toileting Comments Used platform walker; Pt completed toileting tasks including pericare and demonstrating safe technique while adhering to WBS  Pt required A for clothing management, steadying while upright to clean, and for setup  Functional Standing Tolerance   Time 2-3 minutes   Activity Pt participated in bathing task, functional transfers, and toileting tasks using a platform walker c Min A for steadying, safety, and cueing for hand placement  Comments Demonstrated adherence to WBS throughout   Bed Mobility   Rolling R 4  Minimal assistance   Additional items Assist x 2; Increased time required;Verbal cues;LE management;HOB elevated   Supine to Sit 4  Minimal assistance   Additional items Assist x 2; Increased time required;Verbal cues;LE management   Sit to Supine Unable to assess  (Pt was OOB in chair post OT session)   Transfers   Sit to Stand 4  Minimal assistance   Additional items Assist x 2; Increased time required;Verbal cues;Armrests  (Platform RW)   Stand to Sit 4  Minimal assistance   Additional items Assist x 2; Increased time required;Armrests; Verbal cues  (Platform RW)   Additional Comments VD=230/64 (EOB), 123/64 (on commode post standing transfer), 129/81 (in chair post ambulation); HR=89-99bpm; SpO2=% on RA; nsg aware   Functional Mobility   Functional Mobility 4  Minimal assistance   Additional Comments x2 for safety and stability +1 c chair to follow   Additional items Rolling walker  (Platform walker attachment for L UE)   Coordination   Gross Motor Intact; able to perform ADL tasks   Fine Motor Intact;  Pt demonstrated some weakness as she required A for opening containers during ADL tasks, but demonstrated ability to grasp toothbrush, apply toothpast, and manipulate brush/comb when combing hair/brushing teeth   Cognition   Overall Cognitive Status [facility]   Arousal/Participation Alert; Responsive; Cooperative   Attention Within functional limits   Orientation Level Oriented X4   Memory Within functional limits   Following Commands Follows all commands and directions without difficulty   Additional Activities   Additional Activities Other (Comment)  (Education for WBS c ADL participation)   Activity Tolerance   Activity Tolerance Patient limited by fatigue;Patient limited by pain; Patient tolerated treatment well   Medical Staff Made Aware TRINITY Desir, [facility]   Assessment   Assessment Pt was seen by OT for a 55-minute tx session with focus on bed mobility, functional balance, functional mobility, toileting, activity tolerance, ADL status, and functional cognition  Pt demonstrated G/F+ sitting balance (EOB) and P+/P standing balance, min A x2 sit<>stand, and min A x2 with ambulation using L Platform RW +1 c chair to follow with cueing throughout for safety and positioning  BP= 110/64(EOB), 123/64 (on commode), 129/81(in chair post amb)  SpO2= 97-99% on RA; Nsg aware  Pt demonstrated improved activity tolerance, functional mobility, and functional balance requiring A c UE and LE ADLs  Pt participated in feeding, bathing, dressing, toileting, and grooming ADL tasks requiring A for setup and stability during functional transfers  Pt participated in functional mobility activity using platform walker c 1 rest break and verbal cues for technique and safety  Pt demonstrated G balance using platform walker with adherence to WBS throughout  Pt's current deviation from baseline ADL status indicates appropriateness to continue OT tx  Currently recommending d/c to STR when medically appropriate  Will continue to follow pt on OT caseload                  CURRENT GAP IN FUNCTION  Prior to Admission: Functional Status: Patient was independent with mobility/ambulation, transfers, ADL's, IADL's  Estimated length of stay: 10 to 14 days    Anticipated Post-Discharge Disposition/Treatment  Disposition: Return to previous home/apartment  Also able to stay with daughter or have family support upon discharge   Outpatient Services: Physical Therapy (PT), Occupational Therapy (OT) and Speech Therapy    BARRIERS TO DISCHARGE  Weakness, Pain, Balance Difficulty, Dizziness, Fatigue, Caregiver Accessibility, Equipment Needs and Lives Alone    INTERVENTIONS FOR DISCHARGE  Adaptive equipment, Patient/Family/Caregiver Education, Arrange DME needs, Home Modifcations, Therapy exercises and Energy conservation education     REQUIRED THERAPY:  Patient will require PT and OT 90 minutes each per day, five days per week to achieve rehab goals  REQUIRED FUNCTIONAL AND MEDICAL MANAGEMENT FOR INPATIENT REHABILITATION:  Skin:  There are no pressure sores currently, Pain Management: Overall pain is moderately controlled, Deep Vein Thrombosis (DVT) Prophylaxis:  heparin  Patient will require ursing education, bowel/bladder management, Internal medicine to monitor and manage medical conditions, PM&R to maximize function and provide medical oversight of rehabilitation program   Patient will also require continue PT/OT interventions, patient/family education and training and additional consults as needed  RECOMMENDED LEVEL OF CARE: Kimberly Hughes is a 63 y o  female with PMH of hypothyroidism, GERD, who recently fell while getting out of her car  in which she apparently fell on her face and hands, hitting her head with brief LOC followed by pain in L wrist and R leg who presented to hospital unable to ambulate  CT head showed mild frontal/forehead hematoma without acute intracranial findings  Imaging also revealed R comminuted nondisplaced tibial plateau fracture and left distal radius fracture  Orthopedics consulted and recommended NWB of R leg and L wrist   She is to use sugar tong splint for wrist fracture and TROM brace locked in extension for R leg  Patient may WB thru L elbow and use platform walker for ambulation  Patient recommended ortho follow-up with repeat x-rays in about 1 week (around 4/12)  Course also notable for some dizziness and orthostatic hypotension and she received IVF  Prior to admission, patient was fully independent and active  Patient worked two jobs and was independent with all aspects of functional mobility, ADLs and IADLS  Patient currently requires Min A x 2 for transfers and ambulation/hopping with use of platform walker up to 5 feet  Patient is able to feed herself with set up, she can complete grooming, UB bathing and UB dressing with Min A, she requires Mod A with LB bathing and Total A with LB dressing  Patient also requires Min A with all aspects of toileting  Patient will require continued PT/OT interventions, nursing education and care for adriano/bladder training, internal medicine to monitor medical conditons, PM&R to maximize function and provide medical oversight of rehabilitation program   In-patient rehab is required to maximize self-care, mobility, strength, endurance and balance for safe discharge home with [facility] services and family support

## 2021-04-06 NOTE — ASSESSMENT & PLAN NOTE
Status post fall  X-rays with left wrist distal radius nondisplaced fracture      Currently has splint in place  Seen by orthopedics recommends non operative management  Follow up with ortho in 1 week from discharge

## 2021-04-06 NOTE — PROGRESS NOTES
[facility] 47  Progress Note - Nyla Strange 1965, 63 y o  female MRN: [id number]  Unit/Bed#: E2 -01 Encounter: [id number]  Primary Care Provider: Eyad Landeros MD   Date and time admitted to hospital: 4/3/2021  5:31 PM    Dizziness  Assessment & Plan  BP currently soft, noted orthostatic hypotension upon working with PT  Not on any BP meds, stop IV fluids    Left wrist distal radius nondisplaced fracture  Assessment & Plan  Status post fall  X-rays with left wrist distal radius nondisplaced fracture  Currently has splint in place  Seen by orthopedics recommends non operative management  Follow up with ortho in 1 week from discharge    GERD (gastroesophageal reflux disease)  Assessment & Plan  Resume home medication with Protonix and Mylanta    Hypothyroidism  Assessment & Plan  Recently reduced to 175 mcg of levothyroxine, continue    * Tibial plateau fracture  Assessment & Plan  Comminuted, nondisplaced RT  tibial plateau fracture  Plan for conservative treatment and non operative management  Fitted with a TROM brace right knee locked in extension  Patient should maintain nonweightbearing onright leg and left wrist  Continue with ice and analgesics as needed  Pending rehab as recommended per PT  Follow up with Ortho in 1 week        VTE Pharmacologic Prophylaxis:   Pharmacologic: Enoxaparin (Lovenox)  Mechanical VTE Prophylaxis in Place: Yes    Patient Centered Rounds: I have performed bedside rounds with nursing staff today  Discussions with Specialists or Other Care Team Provider: Ortho    Education and Discussions with Family / Patient: Patient    Time Spent for Care: 30 minutes  More than 50% of total time spent on counseling and coordination of care as described above      Current Length of Stay: 1 day(s)    Current Patient Status: Inpatient   Certification Statement: The patient will continue to require additional inpatient hospital stay due to pending authorization    Discharge Plan: Tomorrow to [facility]    Code Status: Level 1 - Full Code      Subjective:   Patient doing well today  No complaints  Discussed timing of next COVID vaccine and okay to be delayed vaccine  Objective:     Vitals:   Temp (24hrs), Av 6 °F (37 °C), Min:97 9 °F (36 6 °C), Max:99 5 °F (37 5 °C)    Temp:  [97 9 °F (36 6 °C)-99 5 °F (37 5 °C)] 98 4 °F (36 9 °C)  HR:  [79-84] 84  Resp:  [16] 16  BP: (100-124)/(59-70) 124/70  SpO2:  [96 %-97 %] 96 %  Body mass index is 36 58 kg/m²  Input and Output Summary (last 24 hours): Intake/Output Summary (Last 24 hours) at 2021 1702  Last data filed at 2021 1545  Gross per 24 hour   Intake 240 ml   Output 1225 ml   Net -985 ml       Physical Exam:     Physical Exam  Constitutional:       Appearance: She is obese  HENT:      Head: Normocephalic  Mouth/Throat:      Mouth: Mucous membranes are moist    Eyes:      Pupils: Pupils are equal, round, and reactive to light  Cardiovascular:      Rate and Rhythm: Normal rate and regular rhythm  Pulses: Normal pulses  Heart sounds: Normal heart sounds  Pulmonary:      Effort: Pulmonary effort is normal       Breath sounds: Normal breath sounds  Abdominal:      General: Abdomen is flat  Palpations: Abdomen is soft  Musculoskeletal:      Comments: Right knee immobilizer in place  Left wrist splint in place   Neurological:      General: No focal deficit present  Mental Status: She is alert and oriented to person, place, and time  Mental status is at baseline     Psychiatric:         Mood and Affect: Mood normal          Additional Data:     Labs:    Results from last 7 days   Lab Units 21  0540   WBC Thousand/uL 4 16*   HEMOGLOBIN g/dL 11 5   HEMATOCRIT % 35 8   PLATELETS Thousands/uL 121*   NEUTROS PCT % 49   LYMPHS PCT % 35   MONOS PCT % 12   EOS PCT % 3     Results from last 7 days   Lab Units 21  0540   SODIUM mmol/L 141   POTASSIUM mmol/L 4 2 CHLORIDE mmol/L 106   CO2 mmol/L 27   BUN mg/dL 14   CREATININE mg/dL 0 58*   ANION GAP mmol/L 8   CALCIUM mg/dL 9 0   GLUCOSE RANDOM mg/dL 97                           * I Have Reviewed All Lab Data Listed Above  * Additional Pertinent Lab Tests Reviewed: All Labs For Current Hospital Admission Reviewed    Imaging:    Xr Wrist 3+ Views Left    Result Date: 4/4/2021  Impression: Likely acute, nondisplaced distal radial metaphysis fracture  This is evidenced by cortical step off over the dorsal radial aspect of the bone  Workstation performed: [id number]     Xr Knee 4+ Vw Right Injury    Result Date: 4/4/2021  Impression: Acute, nondisplaced, nondepressed tibial plateau fracture  Workstation performed: [id number]     Ct Head Without Contrast    Result Date: 4/3/2021  Impression: No acute intracranial abnormality  Workstation performed: [id number]     Ct Lower Extremity Wo Contrast Right    Result Date: 4/3/2021  Impression: Comminuted, nondisplaced tibial plateau fracture  The study was marked in [facility] for immediate notification   Workstation performed: [id number]     Recent Cultures (last 7 days):           Last 24 Hours Medication List:   Current Facility-Administered Medications   Medication Dose Route Frequency Provider Last Rate    acetaminophen  650 mg Oral Q6H PRN Evelene Calico, DO      aluminum-magnesium hydroxide-simethicone  30 mL Oral Q6H PRN Evelene Calico, DO      calcium carbonate  1,000 mg Oral Daily PRN Evelene Calico, DO      cyclobenzaprine  5 mg Oral TID PRN Evelene Calico, DO      docusate sodium  100 mg Oral BID Barry Tim, DO      enoxaparin  40 mg Subcutaneous Q24H [address] Barry Cruz, DO      levothyroxine  175 mcg Oral Early Morning Barry Tim, DO      ondansetron  4 mg Intravenous Q6H PRN Evelene Calico, DO      oxyCODONE  2 5 mg Oral Q4H PRN Evelene Calico, DO      oxyCODONE  5 mg Oral Q4H PRN Evelene Calico, DO      pantoprazole  20 mg Oral Early Morning Barry Tim, DO      simethicone 80 mg Oral 4x Daily PRN Kartik Sun, DO      sodium chloride  100 mL/hr Intravenous Continuous Blilie Banuelos MD Stopped (04/06/21 1105)        Today, Patient Was Seen By: Wiliam Barnes MD    ** Please Note: Dictation voice to text software may have been used in the creation of this document   **

## 2021-04-07 ENCOUNTER — HOSPITAL ENCOUNTER (INPATIENT)
Facility: HOSPITAL | Age: 56
LOS: 10 days | Discharge: HOME HEALTH | DRG: 560 | End: 2021-04-17
Attending: PHYSICAL MEDICINE & REHABILITATION | Admitting: PHYSICAL MEDICINE & REHABILITATION
Payer: COMMERCIAL

## 2021-04-07 VITALS
OXYGEN SATURATION: 98 % | SYSTOLIC BLOOD PRESSURE: 127 MMHG | TEMPERATURE: 98.4 F | BODY MASS INDEX: 36.77 KG/M2 | RESPIRATION RATE: 16 BRPM | HEIGHT: 65 IN | DIASTOLIC BLOOD PRESSURE: 60 MMHG | WEIGHT: 220.68 LBS | HEART RATE: 85 BPM

## 2021-04-07 DIAGNOSIS — R52 ACUTE PAIN: ICD-10-CM

## 2021-04-07 DIAGNOSIS — Z78.9 IMPAIRED MOBILITY AND ADLS: ICD-10-CM

## 2021-04-07 DIAGNOSIS — Z74.09 IMPAIRED MOBILITY AND ADLS: ICD-10-CM

## 2021-04-07 DIAGNOSIS — E03.9 HYPOTHYROIDISM: Primary | ICD-10-CM

## 2021-04-07 DIAGNOSIS — E55.9 VITAMIN D DEFICIENCY: ICD-10-CM

## 2021-04-07 DIAGNOSIS — D64.9 ANEMIA: ICD-10-CM

## 2021-04-07 DIAGNOSIS — K21.9 GERD (GASTROESOPHAGEAL REFLUX DISEASE): ICD-10-CM

## 2021-04-07 DIAGNOSIS — S82.143A TIBIAL PLATEAU FRACTURE: ICD-10-CM

## 2021-04-07 DIAGNOSIS — S52.90XA RADIUS FRACTURE: ICD-10-CM

## 2021-04-07 PROBLEM — D69.6 THROMBOCYTOPENIA (HCC): Status: ACTIVE | Noted: 2021-04-07

## 2021-04-07 LAB
FLUAV RNA RESP QL NAA+PROBE: NEGATIVE
FLUBV RNA RESP QL NAA+PROBE: NEGATIVE
RSV RNA RESP QL NAA+PROBE: NEGATIVE
SARS-COV-2 RNA RESP QL NAA+PROBE: NEGATIVE

## 2021-04-07 PROCEDURE — 97530 THERAPEUTIC ACTIVITIES: CPT

## 2021-04-07 PROCEDURE — 0241U HB NFCT DS VIR RESP RNA 4 TRGT: CPT | Performed by: STUDENT IN AN ORGANIZED HEALTH CARE EDUCATION/TRAINING PROGRAM

## 2021-04-07 PROCEDURE — 97535 SELF CARE MNGMENT TRAINING: CPT

## 2021-04-07 PROCEDURE — 99239 HOSP IP/OBS DSCHRG MGMT >30: CPT | Performed by: STUDENT IN AN ORGANIZED HEALTH CARE EDUCATION/TRAINING PROGRAM

## 2021-04-07 PROCEDURE — NC001 PR NO CHARGE

## 2021-04-07 PROCEDURE — 97116 GAIT TRAINING THERAPY: CPT

## 2021-04-07 PROCEDURE — 99222 1ST HOSP IP/OBS MODERATE 55: CPT | Performed by: NURSE PRACTITIONER

## 2021-04-07 PROCEDURE — 97110 THERAPEUTIC EXERCISES: CPT

## 2021-04-07 RX ORDER — MAGNESIUM HYDROXIDE/ALUMINUM HYDROXICE/SIMETHICONE 120; 1200; 1200 MG/30ML; MG/30ML; MG/30ML
30 SUSPENSION ORAL EVERY 6 HOURS PRN
Status: DISCONTINUED | OUTPATIENT
Start: 2021-04-07 | End: 2021-04-17 | Stop reason: HOSPADM

## 2021-04-07 RX ORDER — OXYCODONE HYDROCHLORIDE 5 MG/1
5 TABLET ORAL EVERY 4 HOURS PRN
Status: DISCONTINUED | OUTPATIENT
Start: 2021-04-07 | End: 2021-04-17 | Stop reason: HOSPADM

## 2021-04-07 RX ORDER — ACETAMINOPHEN 325 MG/1
650 TABLET ORAL EVERY 6 HOURS PRN
Status: CANCELLED | OUTPATIENT
Start: 2021-04-07

## 2021-04-07 RX ORDER — SIMETHICONE 80 MG
80 TABLET,CHEWABLE ORAL 4 TIMES DAILY PRN
Status: DISCONTINUED | OUTPATIENT
Start: 2021-04-07 | End: 2021-04-17 | Stop reason: HOSPADM

## 2021-04-07 RX ORDER — CALCIUM CARBONATE 200(500)MG
1000 TABLET,CHEWABLE ORAL DAILY PRN
Status: CANCELLED | OUTPATIENT
Start: 2021-04-07

## 2021-04-07 RX ORDER — POLYETHYLENE GLYCOL 3350 17 G/17G
17 POWDER, FOR SOLUTION ORAL DAILY PRN
Status: DISCONTINUED | OUTPATIENT
Start: 2021-04-07 | End: 2021-04-17 | Stop reason: HOSPADM

## 2021-04-07 RX ORDER — DOCUSATE SODIUM 100 MG/1
100 CAPSULE, LIQUID FILLED ORAL 2 TIMES DAILY
Status: DISCONTINUED | OUTPATIENT
Start: 2021-04-07 | End: 2021-04-17 | Stop reason: HOSPADM

## 2021-04-07 RX ORDER — OXYCODONE HYDROCHLORIDE 5 MG/1
2.5 TABLET ORAL EVERY 4 HOURS PRN
Status: DISCONTINUED | OUTPATIENT
Start: 2021-04-07 | End: 2021-04-17 | Stop reason: HOSPADM

## 2021-04-07 RX ORDER — ACETAMINOPHEN 325 MG/1
650 TABLET ORAL EVERY 6 HOURS PRN
Status: DISCONTINUED | OUTPATIENT
Start: 2021-04-07 | End: 2021-04-17 | Stop reason: HOSPADM

## 2021-04-07 RX ORDER — OXYCODONE HYDROCHLORIDE 5 MG/1
5 TABLET ORAL EVERY 4 HOURS PRN
Status: CANCELLED | OUTPATIENT
Start: 2021-04-07

## 2021-04-07 RX ORDER — ONDANSETRON 2 MG/ML
4 INJECTION INTRAMUSCULAR; INTRAVENOUS EVERY 6 HOURS PRN
Status: CANCELLED | OUTPATIENT
Start: 2021-04-07

## 2021-04-07 RX ORDER — BISACODYL 10 MG
10 SUPPOSITORY, RECTAL RECTAL DAILY PRN
Status: DISCONTINUED | OUTPATIENT
Start: 2021-04-07 | End: 2021-04-17 | Stop reason: HOSPADM

## 2021-04-07 RX ORDER — MAGNESIUM HYDROXIDE/ALUMINUM HYDROXICE/SIMETHICONE 120; 1200; 1200 MG/30ML; MG/30ML; MG/30ML
30 SUSPENSION ORAL EVERY 6 HOURS PRN
Status: CANCELLED | OUTPATIENT
Start: 2021-04-07

## 2021-04-07 RX ORDER — ONDANSETRON 4 MG/1
4 TABLET, ORALLY DISINTEGRATING ORAL EVERY 6 HOURS PRN
Status: DISCONTINUED | OUTPATIENT
Start: 2021-04-07 | End: 2021-04-17 | Stop reason: HOSPADM

## 2021-04-07 RX ORDER — SIMETHICONE 80 MG
80 TABLET,CHEWABLE ORAL 4 TIMES DAILY PRN
Status: CANCELLED | OUTPATIENT
Start: 2021-04-07

## 2021-04-07 RX ORDER — OXYCODONE HYDROCHLORIDE 5 MG/1
2.5 TABLET ORAL EVERY 4 HOURS PRN
Status: CANCELLED | OUTPATIENT
Start: 2021-04-07

## 2021-04-07 RX ORDER — CYCLOBENZAPRINE HCL 10 MG
5 TABLET ORAL 3 TIMES DAILY PRN
Status: CANCELLED | OUTPATIENT
Start: 2021-04-07

## 2021-04-07 RX ORDER — CYCLOBENZAPRINE HCL 10 MG
5 TABLET ORAL 3 TIMES DAILY PRN
Status: DISCONTINUED | OUTPATIENT
Start: 2021-04-07 | End: 2021-04-17 | Stop reason: HOSPADM

## 2021-04-07 RX ORDER — DOCUSATE SODIUM 100 MG/1
100 CAPSULE, LIQUID FILLED ORAL 2 TIMES DAILY
Status: CANCELLED | OUTPATIENT
Start: 2021-04-07

## 2021-04-07 RX ORDER — CALCIUM CARBONATE 200(500)MG
1000 TABLET,CHEWABLE ORAL DAILY PRN
Status: DISCONTINUED | OUTPATIENT
Start: 2021-04-07 | End: 2021-04-17 | Stop reason: HOSPADM

## 2021-04-07 RX ORDER — PANTOPRAZOLE SODIUM 20 MG/1
20 TABLET, DELAYED RELEASE ORAL
Status: DISCONTINUED | OUTPATIENT
Start: 2021-04-08 | End: 2021-04-17 | Stop reason: HOSPADM

## 2021-04-07 RX ORDER — PANTOPRAZOLE SODIUM 20 MG/1
20 TABLET, DELAYED RELEASE ORAL
Status: CANCELLED | OUTPATIENT
Start: 2021-04-08

## 2021-04-07 RX ADMIN — DOCUSATE SODIUM 100 MG: 100 CAPSULE ORAL at 17:25

## 2021-04-07 RX ADMIN — PANTOPRAZOLE SODIUM 20 MG: 20 TABLET, DELAYED RELEASE ORAL at 06:02

## 2021-04-07 RX ADMIN — LEVOTHYROXINE SODIUM 175 MCG: 100 TABLET ORAL at 05:53

## 2021-04-07 RX ADMIN — OXYCODONE HYDROCHLORIDE 5 MG: 5 TABLET ORAL at 20:53

## 2021-04-07 RX ADMIN — DOCUSATE SODIUM 100 MG: 100 CAPSULE, LIQUID FILLED ORAL at 08:56

## 2021-04-07 RX ADMIN — ACETAMINOPHEN 650 MG: 325 TABLET, FILM COATED ORAL at 16:40

## 2021-04-07 RX ADMIN — ENOXAPARIN SODIUM 40 MG: 40 INJECTION SUBCUTANEOUS at 08:58

## 2021-04-07 NOTE — PLAN OF CARE
Problem: PHYSICAL THERAPY ADULT  Goal: Performs mobility at highest level of function for planned discharge setting  See evaluation for individualized goals  Description: Treatment/Interventions: Functional transfer training, LE strengthening/ROM, Elevations, Therapeutic exercise, Patient/family training, Equipment eval/education, Bed mobility, Compensatory technique education, Continued evaluation, Spoke to nursing, OT, Gait training(wc mobility)  Equipment Recommended: (will continue to evaluate DME needs)       See flowsheet documentation for full assessment, interventions and recommendations  Outcome: Progressing  Note: Prognosis: Good  Problem List: Decreased strength, Decreased range of motion, Decreased endurance, Impaired balance, Decreased mobility, Decreased safety awareness, Obesity, Decreased skin integrity, Orthopedic restrictions, Pain  Assessment: Pt  seated on bedside commode upon my arrival, with OTR and OTS present  Performance of transfers being able to complete with A of 2 and cues provided for hand placement/technique  Pt  able to maintain appropriate WBing restrictions throughout treatment session  Pt  able to progress with an increased amb  trial with use of RW and A of 2 with cues provided for LE sequencing and 3rd present for a close chair follow  Pt  returned to seated in bedside chair with ice applied and LUE elevated on pillow at end of treatment session  BP measured seated in bedside chair at 129/81, RN aware at end of treatment session  PT will continue to recommend d/c to rehab when medically stable for continued improvement of noted impairments  Barriers to Discharge: Inaccessible home environment, Decreased caregiver support  Barriers to Discharge Comments: MICHELLE, level of support at home  PT Discharge Recommendation: Post-Acute Rehabilitation Services     PT - OK to Discharge: Yes(if d/c to rehab when medically stable )    See flowsheet documentation for full assessment

## 2021-04-07 NOTE — PHYSICAL THERAPY NOTE
Physical Therapy Progress Note     04/07/21 1008   PT Last Visit   PT Visit Date 04/07/21   Note Type   Note Type Treatment   Pain Assessment   Pain Assessment Tool 0-10   Pain Score 5   Pain Location/Orientation Orientation: Right;Location: Leg   Hospital Pain Intervention(s) Ambulation/increased activity;Repositioned;Cold applied   Restrictions/Precautions   Weight Bearing Precautions Per Order Yes   LUE Weight Bearing Per Order NWB   RLE Weight Bearing Per Order NWB   Braces or Orthoses LE Immobilizer;Splint  (HKB locked in extension RLE; sugar tong splint LUE)   General   Chart Reviewed Yes   Response to Previous Treatment Patient reporting fatigue but able to participate  Family/Caregiver Present No   Subjective   Subjective Willing to participate in therapy this AM    Transfers   Sit to Stand 4  Minimal assistance   Additional items Assist x 2;Armrests; Increased time required;Verbal cues   Stand to Sit 4  Minimal assistance   Additional items Assist x 2;Armrests; Increased time required;Verbal cues   Toilet transfer 4  Minimal assistance   Additional items Assist x 2;Armrests; Increased time required;Verbal cues; Commode   Ambulation/Elevation   Gait pattern Decreased foot clearance; Short stride  (hop c RW)   Gait Assistance 4  Minimal assist   Additional items Assist x 2;Verbal cues; Tactile cues; Other (Comment)  (with 3rd present for close chair follow)   Assistive Device Rolling walker;L platform   Distance 5'   Balance   Static Sitting Fair   Dynamic Sitting Fair -   Static Standing Poor +   Dynamic Standing Poor   Ambulatory Poor   Endurance Deficit   Endurance Deficit Yes   Endurance Deficit Description pain/fatigue   Activity Tolerance   Activity Tolerance Patient tolerated treatment well   Medical Staff Made Aware Rena Burrows 78    Nurse Made Aware yes   Exercises   THR Supine;Sitting;10 reps;AAROM; Bilateral   Assessment   Prognosis Good   Problem List Decreased strength;Decreased range of motion;Decreased endurance; Impaired balance;Decreased mobility; Decreased safety awareness; Obesity; Decreased skin integrity;Orthopedic restrictions;Pain   Assessment Pt  seated on bedside commode upon my arrival, with OTR and OTS present  Performance of transfers being able to complete with A of 2 and cues provided for hand placement/technique  Pt  able to maintain appropriate WBing restrictions throughout treatment session  Pt  able to progress with an increased amb  trial with use of RW and A of 2 with cues provided for LE sequencing and 3rd present for a close chair follow  Pt  returned to seated in bedside chair with ice applied and LUE elevated on pillow at end of treatment session  BP measured seated in bedside chair at 129/81, RN aware at end of treatment session  PT will continue to recommend d/c to rehab when medically stable for continued improvement of noted impairments  Barriers to Discharge Inaccessible home environment;Decreased caregiver support   Barriers to Discharge Comments MICHELLE, level of support at home  Goals   Patient Goals To go to rehab  STG Expiration Date 04/19/21   PT Treatment Day 3   Plan   Treatment/Interventions Functional transfer training;LE strengthening/ROM; Therapeutic exercise; Endurance training;Gait training;Spoke to nursing;Spoke to case management;OT   Progress Progressing toward goals   PT Frequency Other (Comment)  (5-7x/wk)   Recommendation   PT Discharge Recommendation Post-Acute Rehabilitation Services   Equipment Recommended [facility] Recommended Wheeled walker   Change/add to [employer]? Yes, Add Accessories   Walker Accessories Platform attachment - left arm   PT - OK to Discharge Yes  (if d/c to rehab when medically stable  )   AM-PAC Basic Mobility Inpatient   Turning in Bed Without Bedrails 3   Lying on Back to Sitting on Edge of Flat Bed 2   Moving Bed to Chair 2   Standing Up From Chair 2   Walk in Room 2   Climb 3-5 Stairs 1   Basic Mobility Inpatient Raw Score 12   Basic Mobility Standardized Score 32 23     Leilani Bold, PTA

## 2021-04-07 NOTE — DISCHARGE SUMMARY
[facility]  Discharge- Kyle Gum 1965, 63 y o  female MRN: [id number]  Unit/Bed#: E2 -01 Encounter: [id number]  Primary Care Provider: Cris Stark MD   Date and time admitted to hospital: 4/3/2021  5:31 PM    Dizziness  Assessment & Plan  BP currently soft, noted orthostatic hypotension upon working with PT  Not on any BP meds    Left wrist distal radius nondisplaced fracture  Assessment & Plan  Status post fall  X-rays with left wrist distal radius nondisplaced fracture  Currently has splint in place  Seen by orthopedics recommends non operative management  Follow up with ortho in 1 week from discharge    GERD (gastroesophageal reflux disease)  Assessment & Plan  Resume home medication with Protonix and Mylanta    Hypothyroidism  Assessment & Plan  Recently reduced to 175 mcg of levothyroxine, continue  Repeat TSH in 6-8 weeks    * Tibial plateau fracture  Assessment & Plan  Comminuted, nondisplaced RT  tibial plateau fracture  Plan for conservative treatment and non operative management  Fitted with a TROM brace right knee locked in extension    Patient should maintain nonweightbearing onright leg and left wrist  Continue with ice and analgesics as needed  Follow up with Ortho in 1 week  Accepted to [facility] Physician / Practitioner: Ami Moritz, MD  PCP: Cris Stark MD  Admission Date:   Admission Orders (From admission, onward)     Ordered        04/05/21 1632  Inpatient Admission  Once         04/03/21 2115  Place in Observation  Once         04/03/21 2116  Place in Observation  Once                   Discharge Date: 04/07/21    Resolved Problems  Date Reviewed: 4/7/2021    None          Consultations During Hospital Stay:  · Ortho    Procedures Performed:   · None    Significant Findings / Test Results:   · XR Wrist Left  Impression:       Likely acute, nondisplaced distal radial metaphysis fracture   This is evidenced by cortical step off over the dorsal radial aspect of the bone  · XR Knee Right  Impression:       Acute, nondisplaced, nondepressed tibial plateau fracture  · CT LE WO contrast Right  Impression:       Comminuted, nondisplaced tibial plateau fracture  ·     Incidental Findings:   · None     Test Results Pending at Discharge (will require follow up): · None     Outpatient Tests Requested:  · Follow up with Ortho with repeat Xrays of Left wrist and right knee    Complications:  None    Reason for Admission: Fall and Fracture    Hospital Course:     Mary Santiago is a 63 y o  female patient who originally presented to the hospital on 4/3/2021 due to fall  Possibly mechanical as patient may have slipped on mud  Past medical history of GERD, and hypothyroidism  Patient was found to have acute fracture of her left wrist and right knee tibial with Ortho evaluation recommending conservative management with brace of her wrist and the  PT evaluated the patient recommending acute rehab  Discussed with patient and daughter, etiology of fall likely mechanical as patient did not have prior symptoms, denies fever, chills, chest pain or palpitations  She was noted to be orthostatic positive working with OT and was treated IV fluids  Patient was discharged to [address] [place] [address] acute rehab for further evaluation and treatment  To follow up with Orthopedic surgery in 1 week with repeat x-rays of her knee and wrist     Please see above list of diagnoses and related plan for additional information  Condition at Discharge: fair     Discharge Day Visit / Exam:     Subjective:  Patient seen today reports doing well  She was able to work with PT and OT  No events overnight     Vitals: Blood Pressure: 127/60 (04/07/21 0700)  Pulse: 85 (04/07/21 0700)  Temperature: 98 4 °F (36 9 °C) (04/07/21 0700)  Temp Source: Temporal (04/07/21 0700)  Respirations: 16 (04/07/21 0700)  Height: 5' 5" (165 1 cm) (04/03/21 2202)  Weight - Scale: 100 kg (220 lb 10 9 oz) (04/07/21 0533)  SpO2: 98 % (04/07/21 0700)  Exam:   Physical Exam  Constitutional:       Appearance: She is obese  HENT:      Head: Normocephalic  Mouth/Throat:      Mouth: Mucous membranes are moist    Eyes:      Pupils: Pupils are equal, round, and reactive to light  Cardiovascular:      Rate and Rhythm: Normal rate and regular rhythm  Pulses: Normal pulses  Heart sounds: Normal heart sounds  Pulmonary:      Effort: Pulmonary effort is normal       Breath sounds: Normal breath sounds  Abdominal:      General: Abdomen is flat  Palpations: Abdomen is soft  Musculoskeletal:      Comments: Right knee immobilizer in place  Left wrist splint in place   Neurological:      General: No focal deficit present  Mental Status: She is alert and oriented to person, place, and time  Mental status is at baseline  Psychiatric:         Mood and Affect: Mood normal          Discussion with Family: daughter    Discharge instructions/Information to patient and family:   See after visit summary for information provided to patient and family  Provisions for Follow-Up Care:  See after visit summary for information related to follow-up care and any pertinent home health orders  Disposition:     Acute Rehab at [facility]    For Discharges to [facility]:   · Not Applicable to this Patient - Not Applicable to this Patient    Planned Readmission: None     Discharge Statement:  I spent 35 minutes discharging the patient  This time was spent on the day of discharge  I had direct contact with the patient on the day of discharge  Greater than 50% of the total time was spent examining patient, answering all patient questions, arranging and discussing plan of care with patient as well as directly providing post-discharge instructions  Additional time then spent on discharge activities      Discharge Medications:  See after visit summary for reconciled discharge medications provided to patient and family        ** Please Note: This note has been constructed using a voice recognition system **

## 2021-04-07 NOTE — ASSESSMENT & PLAN NOTE
Nutritional counseling as needed  Gastric sleeve surgery 5 years ago  Recently started to gain weight, Endocrine ordered Contrave for patient, but she would prefer nutritional counseling  Has an appointment to follow-up with Weight Management

## 2021-04-07 NOTE — TRANSPORTATION MEDICAL NECESSITY
Section I - General Information    Name of Patient: Brianna Oakley                 : 1965    Medicare #: [id number]  Transport Date: 21 (PCS is valid for round trips on this date and for all repetitive trips in the 60-day range as noted below )  Origin: [address]                                                         Destination: [facility]  Is the pt's stay covered under Medicare Part A (PPS/DRG)   []     Closest appropriate facility? If no, why is transport to more distant facility required? yes  If hospice pt, is this transport related to pt's terminal illness? NA       Section II - Medical Necessity Questionnaire  Ambulance transportation is medically necessary only if other means of transport are contraindicated or would be potentially harmful to the patient  To meet this requirement, the patient must either be "bed confined" or suffer from a condition such that transport by means other than ambulance is contraindicated by the patient's condition  The following questions must be answered by the medical professional signing below for this form to be valid:    1)  Describe the MEDICAL CONDITION (physical and/or mental) of this patient AT [address] [place] [address] that requires the patient to be transported in an ambulance and why transport by other means is contraindicated by the patient's condition:Left wrist distal radius nondisplaced fracture  Tibial plateau fracture  Fitted with a TROM brace right knee locked in extension  2) Is the patient "bed confined" as defined below? Yes  To be "be confined" the patient must satisfy all three of the following conditions: (1) unable to get up from bed without Assistance; AND (2) unable to ambulate; AND (3) unable to sit in a chair or wheelchair  3) Can this patient safely be transported by car or wheelchair van (i e , seated during transport without a medical attendant or monitoring)?    No    4) In addition to completing questions 1-3 above, please check any of the following conditions that apply*:   *Note: supporting documentation for any boxes checked must be maintained in the patient's medical records  If hosp-hosp transfer, describe services needed at 2nd facility not available at 1st facility? Non-Healed Fractures  Unable to maintained seated position for time of transport  Medical attendant required       Section III - Signature of Physician or Healthcare Professional  I certify that the above information is true and correct based on my evaluation of this patient, and represent that the patient requires transport by ambulance and that other forms of transport are contraindicated  I understand that this information will be used by the Centers for Medicare and Medicaid Services (CMS) to support the determination of medical necessity for ambulance services, and I represent that I have personal knowledge of the patient's condition at time of transport  []  If this box is checked, I also certify that the patient is physically or mentally incapable of signing the ambulance service's claim and that the institution with which I am affiliated has furnished care, services, or assistance to the patient  My signature below is made on behalf of the patient pursuant to 42 CFR §424 36(b)(4)  In accordance with 42 CFR §424 37, the specific reason(s) that the patient is physically or mentally incapable of signing the claim form is as follows: N A        Signature of Physician* or Healthcare Professional______________________________________________________________  Signature Date 04/07/21 (For scheduled repetitive transports, this form is not valid for transports performed more than 60 days after this date)    Printed Name & Credentials of Physician or Healthcare Professional (MD, DO, RN, etc )____Rylee Lujan MSKRISSY____________________________  *Form must be signed by patient's attending physician for scheduled, repetitive transports   For non-repetitive, unscheduled ambulance transports, if unable to obtain the signature of the attending physician, any of the following may sign (choose appropriate option below)  [] Physician Assistant []  Clinical Nurse Specialist []  Registered Nurse  []  Nurse Practitioner  [x] Discharge Planner

## 2021-04-07 NOTE — ASSESSMENT & PLAN NOTE
Plt count 121 on 4/6   165 on 4/3  Asymptomatic  Currently on lovenox injections  No acute s/s of bleeding  Repeat CBC tomorrow

## 2021-04-07 NOTE — PROGRESS NOTES
PHYSICAL MEDICINE AND REHABILITATION   PREADMISSION ASSESSMENT     Projected [facility] Diagnoses:  Impairment of mobility, safety and Activities of Daily Living (ADLs) due to Orthopedic Disorders:  08 9  Other Orthopedic Right Tibial Plateau Fracture, L Distal Radius Fracture   Etiologic: Tibial Plateau Fracture, Wrist Fracture   Date of Onset: 4/3  Date of surgery:     PATIENT INFORMATION  Name: Rosalinda Sandhoff Phone #: [phone] (home)   Address: [address] [place] [address]  YOB: 1965 Age: 63 y o  SS#   Marital Status: /Civil Union  Ethnicity:   Employment Status: currently employed was working 2 jobs prior to injury   Extended Emergency Contact Information  Primary Emergency Contact: [address] Phone: [phone]  Relation: Daughter (Daughter is an Occupational Therapist)   Advance Directive: Full Code     INSURANCE/COVERAGE:     Primary Payor: [employer] [address] / Plan: [employer] / Product Type: Blue Fee for Service /   Secondary Payer:<NONE>   Payer Contact:  Payer Contact:   Contact Phone:  Contact Phone:     Authorization #: [id number]  Coverage Dates: 4/7/2021- 4/13/2021  LCD: 4/13/21 (update due) please call [phone] with clinical  Update on 4/13  MEDICARE #: NA  Medicare Days: NA   Medical Record #: [id number]     As of 4/7 patient has deductible of $1,350 00/ $1,347 61 remaining  Patient has Out of Pocket MAX of $5,400 00, [id number] is remaining for 2021  This does not include billing from recent acute care hospital stay            REFERRAL SOURCE:   Referring provider: Jayda Reynolds MD  Referring facility: [address] [place] [address]   Room: [place] [address] /E2 -*  PCP: Jose Phipps MD PCP phone number: [phone]    MEDICAL INFORMATION  HPI:   Rosalinda Sandhoff is a 63 y o  female with PMH of hypothyroidism, GERD, who recently fell while getting out of her care in which she apparently fell on her face and hands, hitting her head with brief LOC followed by pain in L wrist and R leg who presented to hospital unable to ambulate  CT head showed mild frontal/forehead hematoma without acute intracranial findings  Imaging also revealed R comminuted nondisplaced tibial plateau fracture and left distal radius fracture  Orthopedics consulted and recommended NWB of R leg and L wrist   She is to use sugar tong splint for wrist fracture and TROM brace locked in extension for R leg  Patient may WB thru L elbow and use platform walker for ambulation  Patient recommended ortho follow-up with repeat x-rays in about 1 week (around )  Course also notable for some dizziness and orthostatic hypotension and she received IVF  Patient was evaluated by PT and OT , post-acute rehab was recommended  A PM&R consult was also competed by Dr Kiran Bonilla who felt patient is a good candidate for acute in-patient rehabilitation  Patient is medically stable and ready for discharge to [facility] today  COVID-19 Testin/7 - negative     Past Medical History:   Past Surgical History:    Allergies:     Past Medical History:   Diagnosis Date    Disease of thyroid gland     Past Surgical History:   Procedure Laterality Date    GASTRIC BYPASS      HERNIA REPAIR       No Known Allergies      Comorbidities/Surgeries in the last 100 days: Right tibial fracture, Left radial fracture, orthostasis, anxiety, obesity, GERD, hypothyroidism     CURRENT VITAL SIGNS:   Temp:  [98 3 °F (36 8 °C)-98 4 °F (36 9 °C)] 98 4 °F (36 9 °C)  HR:  [83-85] 85  Resp:  [16-17] 16  BP: (121-127)/(60-70) 127/60   Intake/Output Summary (Last 24 hours) at 2021 1358  Last data filed at 2021 0630  Gross per 24 hour   Intake 360 ml   Output 1225 ml   Net -865 ml        LABORATORY RESULTS:      Lab Results   Component Value Date    HGB 11 5 2021    HCT 35 8 2021    WBC 4 16 (L) 2021     Lab Results   Component Value Date    BUN 14 04/06/2021    K 4 2 04/06/2021     04/06/2021    GLUCOSE 110 10/27/2016    CREATININE 0 58 (L) 04/06/2021     No results found for: PROTIME, INR     DIAGNOSTIC STUDIES:  Xr Wrist 3+ Views Left    Result Date: 4/4/2021  Impression: Likely acute, nondisplaced distal radial metaphysis fracture  This is evidenced by cortical step off over the dorsal radial aspect of the bone  Workstation performed: [id number]     Xr Knee 4+ Vw Right Injury    Result Date: 4/4/2021  Impression: Acute, nondisplaced, nondepressed tibial plateau fracture  Workstation performed: [id number]     Ct Head Without Contrast    Result Date: 4/3/2021  Impression: No acute intracranial abnormality  Workstation performed: [id number]     Ct Lower Extremity Wo Contrast Right    Result Date: 4/3/2021  Impression: Comminuted, nondisplaced tibial plateau fracture  The study was marked in [facility] for immediate notification   Workstation performed: [id number]       PRECAUTIONS/SPECIAL NEEDS:  Weight Bearing Precautions:  NWB RLE with knee brace locked in extension, NWB to L wrist with splint, may use platform walker to weightbear through elbow   , Anticoagulation:  Lovonix, Safety Concerns and Pain Management, Fall Risk     MEDICATIONS:     Current Facility-Administered Medications:     acetaminophen (TYLENOL) tablet 650 mg, 650 mg, Oral, Q6H PRN, Barry Tim DO    aluminum-magnesium hydroxide-simethicone (MYLANTA) oral suspension 30 mL, 30 mL, Oral, Q6H PRN, Barry Tim DO    calcium carbonate (TUMS) chewable tablet 1,000 mg, 1,000 mg, Oral, Daily PRN, Barry Tim DO    cyclobenzaprine (FLEXERIL) tablet 5 mg, 5 mg, Oral, TID PRN, Paula Galvez DO, 5 mg at 04/05/21 0951    docusate sodium (COLACE) capsule 100 mg, 100 mg, Oral, BID, Barry Tim DO, 100 mg at 04/07/21 0856    enoxaparin (LOVENOX) subcutaneous injection 40 mg, 40 mg, Subcutaneous, Q24H DELROY, Barry Tim DO, 40 mg at 04/07/21 0858    levothyroxine tablet 175 mcg, 175 mcg, Oral, Early Morning, Brennon Ricci, DO, 175 mcg at 04/07/21 0553    ondansetron (ZOFRAN) injection 4 mg, 4 mg, Intravenous, Q6H PRN, Barry Tim DO    oxyCODONE (ROXICODONE) IR tablet 2 5 mg, 2 5 mg, Oral, Q4H PRN, Barry Tim DO    oxyCODONE (ROXICODONE) IR tablet 5 mg, 5 mg, Oral, Q4H PRN, Barry Tim DO, 5 mg at 04/06/21 2153    pantoprazole (PROTONIX) EC tablet 20 mg, 20 mg, Oral, Early Morning, Barry Tim DO, Stopped at 04/07/21 0608    simethicone (MYLICON) chewable tablet 80 mg, 80 mg, Oral, 4x Daily PRN, Barry Tim DO    SKIN INTEGRITY:   Abrasion right forehead    PRIOR LEVEL OF FUNCTION:  She lives in [facility] single family home  Moni Toribio  lives alone  Self Care: Independent, Indoor Mobility: Independent, Stairs (in/outdoor): Independent and Cognition: Independent    FALLS IN THE LAST 6 MONTHS: 1    HOME ENVIRONMENT:  The living area: can live on one level  There are 3 steps to enter the home  The patient will not have 24 hour supervision/physical assistance available upon discharge  PREVIOUS DME:  Equipment in home (previous DME): None    FUNCTIONAL STATUS:  Physical Therapy Occupational Therapy Speech Therapy   04/07/21 1008    PT Last Visit   PT Visit Date 04/07/21   Note Type   Note Type Treatment   Pain Assessment   Pain Assessment Tool 0-10   Pain Score 5   Pain Location/Orientation Orientation: Right;Location: Leg   Hospital Pain Intervention(s) Ambulation/increased activity;Repositioned;Cold applied   Restrictions/Precautions   Weight Bearing Precautions Per Order Yes   LUE Weight Bearing Per Order NWB   RLE Weight Bearing Per Order NWB   Braces or Orthoses LE Immobilizer;Splint  (HKB locked in extension RLE; sugar tong splint LUE)   General   Chart Reviewed Yes   Response to Previous Treatment Patient reporting fatigue but able to participate     Family/Caregiver Present No   Subjective   Subjective Willing to participate in therapy this AM    Transfers   Sit to Stand 4  Minimal assistance   Additional items Assist x 2;Armrests; Increased time required;Verbal cues   Stand to Sit 4  Minimal assistance   Additional items Assist x 2;Armrests; Increased time required;Verbal cues   Toilet transfer 4  Minimal assistance   Additional items Assist x 2;Armrests; Increased time required;Verbal cues; Commode   Ambulation/Elevation   Gait pattern Decreased foot clearance; Short stride  (hop c RW)   Gait Assistance 4  Minimal assist   Additional items Assist x 2;Verbal cues; Tactile cues; Other (Comment)  (with 3rd present for close chair follow)   Assistive Device Rolling walker;L platform   Distance 5'   Balance   Static Sitting Fair   Dynamic Sitting Fair -   Static Standing Poor +   Dynamic Standing Poor   Ambulatory Poor   Endurance Deficit   Endurance Deficit Yes   Endurance Deficit Description pain/fatigue   Activity Tolerance   Activity Tolerance Patient tolerated treatment well   Medical Staff Made Aware Rena Morrison 78    Nurse Made Aware yes   Exercises   THR Supine;Sitting;10 reps;AAROM; Bilateral   Assessment   Prognosis Good   Problem List Decreased strength;Decreased range of motion;Decreased endurance; Impaired balance;Decreased mobility; Decreased safety awareness; Obesity; Decreased skin integrity;Orthopedic restrictions;Pain   Assessment Pt  seated on bedside commode upon my arrival, with OTR and OTS present  Performance of transfers being able to complete with A of 2 and cues provided for hand placement/technique  Pt  able to maintain appropriate WBing restrictions throughout treatment session  Pt  able to progress with an increased amb  trial with use of RW and A of 2 with cues provided for LE sequencing and 3rd present for a close chair follow  Pt  returned to seated in bedside chair with ice applied and LUE elevated on pillow at end of treatment session  BP measured seated in bedside chair at 129/81, RN aware at end of treatment session   PT will continue to recommend d/c to rehab when medically stable for continued improvement of noted impairments  04/07/21 0900    OT Last Visit   OT Visit Date 04/07/21   Note Type   Note Type Treatment   Restrictions/Precautions   Weight Bearing Precautions Per Order Yes   LUE Weight Bearing Per Order NWB   RLE Weight Bearing Per Order NWB   Braces or Orthoses LE Immobilizer;Splint  (HKB locked in extension RLE; sugar tong splint LUE)   Other Precautions WBS; Fall Risk;Pain   Pain Assessment   Pain Assessment Tool 0-10   Pain Score 5   Pain Location/Orientation Orientation: Right;Location: Leg   Hospital Pain Intervention(s) Repositioned;Cold applied; Emotional support; Ambulation/increased activity   ADL   Where Assessed Chair   Eating Assistance 6  Modified independent   Eating Deficit Increased time to complete   Eating Comments Pt completed beverage management tasks c straw   Grooming Assistance 4  Minimal Assistance   Grooming Deficit Setup; Increased time to complete; Teeth care   Grooming Comments Pt completed brushing hair, brushing teeth, washing face c A for setup including opening toothpaste  UB Bathing Assistance 4  Minimal Assistance   UB Bathing Deficit Increased time to complete;Setup;Right arm  (A c back cleaning/drying)   UB Bathing Comments Pt completed UB bathing c A for back and R arm   LB Bathing Assistance 3  Moderate Assistance   LB Bathing Deficit Left lower leg including foot;Right lower leg including foot; Increased time to complete;Supervision/safety;Setup;Perineal area   LB Bathing Comments Pt completed bathing tasks c A for standing/steadying during pericare  Pt also required A for initial setup of bathing supplies  UB Dressing Assistance 4  Minimal Assistance   UB Dressing Deficit Thread LUE; Increased time to complete;Supervision/safety;Setup;Verbal cueing; Fasteners   UB Dressing Comments Pt completed UE dressing task c A for managing fasteners/tying gown   LB Dressing Assistance 1  Total Assistance   LB Dressing Deficit Don/doff L sock; Increased time to complete;Setup;Verbal cueing;Supervision/safety   Toileting Assistance  4  Minimal Assistance   Toileting Deficit Setup;Steadying;Verbal cueing;Supervison/safety; Increased time to complete; Bedside commode;Clothing management up;Clothing management down   Toileting Comments Used platform walker; Pt completed toileting tasks including pericare and demonstrating safe technique while adhering to WBS  Pt required A for clothing management, steadying while upright to clean, and for setup  Functional Standing Tolerance   Time 2-3 minutes   Activity Pt participated in bathing task, functional transfers, and toileting tasks using a platform walker c Min A for steadying, safety, and cueing for hand placement  Comments Demonstrated adherence to WBS throughout   Bed Mobility   Rolling R 4  Minimal assistance   Additional items Assist x 2; Increased time required;Verbal cues;LE management;HOB elevated   Supine to Sit 4  Minimal assistance   Additional items Assist x 2; Increased time required;Verbal cues;LE management   Sit to Supine Unable to assess  (Pt was OOB in chair post OT session)   Transfers   Sit to Stand 4  Minimal assistance   Additional items Assist x 2; Increased time required;Verbal cues;Armrests  (Platform RW)   Stand to Sit 4  Minimal assistance   Additional items Assist x 2; Increased time required;Armrests; Verbal cues  (Platform RW)   Additional Comments IV=108/64 (EOB), 123/64 (on commode post standing transfer), 129/81 (in chair post ambulation); HR=89-99bpm; SpO2=% on RA; nsg aware   Functional Mobility   Functional Mobility 4  Minimal assistance   Additional Comments x2 for safety and stability +1 c chair to follow   Additional items Rolling walker  (Platform walker attachment for L UE)   Coordination   Gross Motor Intact; able to perform ADL tasks   Fine Motor Intact;  Pt demonstrated some weakness as she required A for opening containers during ADL tasks, but demonstrated ability to grasp toothbrush, apply toothpast, and manipulate brush/comb when combing hair/brushing teeth   Cognition   Overall Cognitive Status [facility]   Arousal/Participation Alert; Responsive; Cooperative   Attention Within functional limits   Orientation Level Oriented X4   Memory Within functional limits   Following Commands Follows all commands and directions without difficulty   Additional Activities   Additional Activities Other (Comment)  (Education for WBS c ADL participation)   Activity Tolerance   Activity Tolerance Patient limited by fatigue;Patient limited by pain; Patient tolerated treatment well   Medical Staff Made Aware TRINITY Cole, Nsg Madyson Maldonado   Assessment   Assessment Pt was seen by OT for a 55-minute tx session with focus on bed mobility, functional balance, functional mobility, toileting, activity tolerance, ADL status, and functional cognition  Pt demonstrated G/F+ sitting balance (EOB) and P+/P standing balance, min A x2 sit<>stand, and min A x2 with ambulation using L Platform RW +1 c chair to follow with cueing throughout for safety and positioning  BP= 110/64(EOB), 123/64 (on commode), 129/81(in chair post amb)  SpO2= 97-99% on RA; Nsg aware  Pt demonstrated improved activity tolerance, functional mobility, and functional balance requiring A c UE and LE ADLs  Pt participated in feeding, bathing, dressing, toileting, and grooming ADL tasks requiring A for setup and stability during functional transfers  Pt participated in functional mobility activity using platform walker c 1 rest break and verbal cues for technique and safety  Pt demonstrated G balance using platform walker with adherence to WBS throughout  Pt's current deviation from baseline ADL status indicates appropriateness to continue OT tx  Currently recommending d/c to STR when medically appropriate  Will continue to follow pt on OT caseload                  CURRENT GAP IN FUNCTION  Prior to Admission: Functional Status: Patient was independent with mobility/ambulation, transfers, ADL's, IADL's  Estimated length of stay: 10 to 14 days    Anticipated Post-Discharge Disposition/Treatment  Disposition: Return to previous home/apartment  Also able to stay with daughter or have family support upon discharge   Outpatient Services: Physical Therapy (PT), Occupational Therapy (OT) and Speech Therapy    BARRIERS TO DISCHARGE  Weakness, Pain, Balance Difficulty, Dizziness, Fatigue, Caregiver Accessibility, Equipment Needs and Lives Alone    INTERVENTIONS FOR DISCHARGE  Adaptive equipment, Patient/Family/Caregiver Education, Arrange DME needs, Home Modifcations, Therapy exercises and Energy conservation education     REQUIRED THERAPY:  Patient will require PT and OT 90 minutes each per day, five days per week to achieve rehab goals  REQUIRED FUNCTIONAL AND MEDICAL MANAGEMENT FOR INPATIENT REHABILITATION:  Skin:  There are no pressure sores currently, Pain Management: Overall pain is moderately controlled, Deep Vein Thrombosis (DVT) Prophylaxis:  heparin  Patient will require ursing education, bowel/bladder management, Internal medicine to monitor and manage medical conditions, PM&R to maximize function and provide medical oversight of rehabilitation program   Patient will also require continue PT/OT interventions, patient/family education and training and additional consults as needed  RECOMMENDED LEVEL OF CARE: Van Murillo is a 63 y o  female with PMH of hypothyroidism, GERD, who recently fell while getting out of her car  in which she apparently fell on her face and hands, hitting her head with brief LOC followed by pain in L wrist and R leg who presented to hospital unable to ambulate  CT head showed mild frontal/forehead hematoma without acute intracranial findings  Imaging also revealed R comminuted nondisplaced tibial plateau fracture and left distal radius fracture    Orthopedics consulted and recommended NWB of R leg and L wrist   She is to use sugar tong splint for wrist fracture and TROM brace locked in extension for R leg  Patient may WB thru L elbow and use platform walker for ambulation  Patient recommended ortho follow-up with repeat x-rays in about 1 week (around 4/12)  Course also notable for some dizziness and orthostatic hypotension and she received IVF  Prior to admission, patient was fully independent and active  Patient worked two jobs and was independent with all aspects of functional mobility, ADLs and IADLS  Patient currently requires Min A x 2 for transfers and ambulation/hopping with use of platform walker up to 5 feet  Patient is able to feed herself with set up, she can complete grooming, UB bathing and UB dressing with Min A, she requires Mod A with LB bathing and Total A with LB dressing  Patient also requires Min A with all aspects of toileting  Patient will require continued PT/OT interventions, nursing education and care for adriano/bladder training, internal medicine to monitor medical conditons, PM&R to maximize function and provide medical oversight of rehabilitation program   In-patient rehab is required to maximize self-care, mobility, strength, endurance and balance for safe discharge home with [facility] services and family support

## 2021-04-07 NOTE — ASSESSMENT & PLAN NOTE
Currently taking levothyroxine 175mcg  Dose decreased by Endocrinology in March  Last TSH was 0 14 and T4 was 1 57 on 1/28  Continue to follow-up with Endocrinology as outpatient

## 2021-04-07 NOTE — PROGRESS NOTES
PHYSICAL MEDICINE AND REHABILITATION   PREADMISSION ASSESSMENT     Projected [facility] Diagnoses:  Impairment of mobility, safety and Activities of Daily Living (ADLs) due to Orthopedic Disorders:  08 9  Other Orthopedic Right Tibial Plateau Fracture, L Distal Radius Fracture   Etiologic: Tibial Plateau Fracture, Wrist Fracture   Date of Onset: 4/3  Date of surgery:     PATIENT INFORMATION  Name: Ave Niño Phone #: [phone] (home)   Address: [address] [place] [address]  YOB: 1965 Age: 63 y o  SS#   Marital Status: /Civil Union  Ethnicity:   Employment Status: currently employed was working 2 jobs prior to injury   Extended Emergency Contact Information  Primary Emergency Contact: [address] Phone: [phone]  Relation: Daughter (Daughter is an Occupational Therapist)   Advance Directive: Full Code     INSURANCE/COVERAGE:     Primary Payor: Jany Day / Plan: Kaden Formosa / Product Type: Blue Fee for Service /   Secondary Payer:<NONE>   Payer Contact:  Payer Contact:   Contact Phone:  Contact Phone:     Authorization #:   Coverage Dates: 4/7/2021- 4/13/2021  LCD: 4/13/21 (update due)   MEDICARE #: NA  Medicare Days: NA   Medical Record #: [id number]     As of 4/7 patient has deductible of $1,350 00/ $1,347 61 remaining  Patient has Out of Pocket MAX of $5,400 00, [id number] is remaining for 2021  This does not include billing from recent acute care hospital stay            REFERRAL SOURCE:   Referring provider: Tuan Cruz MD  Referring facility: [address] [place] [address]   Room: [place] [address] /E2 -*  PCP: Bj Linda MD PCP phone number: [phone]    MEDICAL INFORMATION  HPI:   Ave Niño is a 63 y o  female with PMH of hypothyroidism, GERD, who recently fell while getting out of her care in which she apparently fell on her face and hands, hitting her head with brief LOC followed by pain in L wrist and R leg who presented to hospital unable to ambulate  CT head showed mild frontal/forehead hematoma without acute intracranial findings  Imaging also revealed R comminuted nondisplaced tibial plateau fracture and left distal radius fracture  Orthopedics consulted and recommended NWB of R leg and L wrist   She is to use sugar tong splint for wrist fracture and TROM brace locked in extension for R leg  Patient may WB thru L elbow and use platform walker for ambulation  Patient recommended ortho follow-up with repeat x-rays in about 1 week (around )  Course also notable for some dizziness and orthostatic hypotension and she received IVF  Patient was evaluated by PT and OT , post-acute rehab was recommended  A PM&R consult was also competed by Dr Sofía Perkins who felt patient is a good candidate for acute in-patient rehabilitation  Patient is medically stable and ready for discharge to [facility] today  COVID-19 Testin/7 - negative     Past Medical History:   Past Surgical History:    Allergies:     Past Medical History:   Diagnosis Date    Disease of thyroid gland     Past Surgical History:   Procedure Laterality Date    GASTRIC BYPASS      HERNIA REPAIR       No Known Allergies      Comorbidities/Surgeries in the last 100 days: Right tibial fracture, Left radial fracture, orthostasis, anxiety, obesity, GERD, hypothyroidism     CURRENT VITAL SIGNS:   Temp:  [98 3 °F (36 8 °C)-98 4 °F (36 9 °C)] 98 4 °F (36 9 °C)  HR:  [83-85] 85  Resp:  [16-17] 16  BP: (121-127)/(60-70) 127/60   Intake/Output Summary (Last 24 hours) at 2021 1307  Last data filed at 2021 0630  Gross per 24 hour   Intake 360 ml   Output 1225 ml   Net -865 ml        LABORATORY RESULTS:      Lab Results   Component Value Date    HGB 11 5 2021    HCT 35 8 2021    WBC 4 16 (L) 2021     Lab Results   Component Value Date    BUN 14 2021    K 4 2 2021     2021    GLUCOSE 110 10/27/2016    CREATININE 0 58 (L) 04/06/2021     No results found for: PROTIME, INR     DIAGNOSTIC STUDIES:  Xr Wrist 3+ Views Left    Result Date: 4/4/2021  Impression: Likely acute, nondisplaced distal radial metaphysis fracture  This is evidenced by cortical step off over the dorsal radial aspect of the bone  Workstation performed: [id number]     Xr Knee 4+ Vw Right Injury    Result Date: 4/4/2021  Impression: Acute, nondisplaced, nondepressed tibial plateau fracture  Workstation performed: [id number]     Ct Head Without Contrast    Result Date: 4/3/2021  Impression: No acute intracranial abnormality  Workstation performed: [id number]     Ct Lower Extremity Wo Contrast Right    Result Date: 4/3/2021  Impression: Comminuted, nondisplaced tibial plateau fracture  The study was marked in [facility] for immediate notification   Workstation performed: [id number]       PRECAUTIONS/SPECIAL NEEDS:  Weight Bearing Precautions:  NWB RLE with knee brace locked in extension, NWB to L wrist with splint, may use platform walker to weightbear through elbow   , Anticoagulation:  Lovonix, Safety Concerns and Pain Management, Fall Risk     MEDICATIONS:     Current Facility-Administered Medications:     acetaminophen (TYLENOL) tablet 650 mg, 650 mg, Oral, Q6H PRN, Barry Tim DO    aluminum-magnesium hydroxide-simethicone (MYLANTA) oral suspension 30 mL, 30 mL, Oral, Q6H PRN, Barry Tim DO    calcium carbonate (TUMS) chewable tablet 1,000 mg, 1,000 mg, Oral, Daily PRN, Barry Tim DO    cyclobenzaprine (FLEXERIL) tablet 5 mg, 5 mg, Oral, TID PRN, Erica Oconnor, DO, 5 mg at 04/05/21 0951    docusate sodium (COLACE) capsule 100 mg, 100 mg, Oral, BID, Barry Tim DO, 100 mg at 04/07/21 0856    enoxaparin (LOVENOX) subcutaneous injection 40 mg, 40 mg, Subcutaneous, Q24H DELROY, Barry Tim DO, 40 mg at 04/07/21 0858    levothyroxine tablet 175 mcg, 175 mcg, Oral, Early Morning, Barry Tim DO, 175 mcg at 04/07/21 0553   ondansetron (ZOFRAN) injection 4 mg, 4 mg, Intravenous, Q6H PRN, Barry Tim DO    oxyCODONE (ROXICODONE) IR tablet 2 5 mg, 2 5 mg, Oral, Q4H PRN, Barry Tim DO    oxyCODONE (ROXICODONE) IR tablet 5 mg, 5 mg, Oral, Q4H PRN, Barry Tim, , 5 mg at 04/06/21 2153    pantoprazole (PROTONIX) EC tablet 20 mg, 20 mg, Oral, Early Morning, Barry Tim DO, Stopped at 04/07/21 0608    simethicone (MYLICON) chewable tablet 80 mg, 80 mg, Oral, 4x Daily PRN, Barry Tim DO    sodium chloride 0 9 % infusion, 100 mL/hr, Intravenous, Continuous, Alex Bravo MD, Stopped at 04/06/21 1105    SKIN INTEGRITY:   Abrasion right forehead    PRIOR LEVEL OF FUNCTION:  She lives in a(n) single family home  Redia Clarity  lives alone  Self Care: Independent, Indoor Mobility: Independent, Stairs (in/outdoor): Independent and Cognition: Independent    FALLS IN THE LAST 6 MONTHS: 1    HOME ENVIRONMENT:  The living area: can live on one level  There are 3 steps to enter the home  The patient will not have 24 hour supervision/physical assistance available upon discharge  PREVIOUS DME:  Equipment in home (previous DME): None    FUNCTIONAL STATUS:  Physical Therapy Occupational Therapy Speech Therapy   04/07/21 1008    PT Last Visit   PT Visit Date 04/07/21   Note Type   Note Type Treatment   Pain Assessment   Pain Assessment Tool 0-10   Pain Score 5   Pain Location/Orientation Orientation: Right;Location: Leg   Hospital Pain Intervention(s) Ambulation/increased activity;Repositioned;Cold applied   Restrictions/Precautions   Weight Bearing Precautions Per Order Yes   LUE Weight Bearing Per Order NWB   RLE Weight Bearing Per Order NWB   Braces or Orthoses LE Immobilizer;Splint  (HKB locked in extension RLE; sugar tong splint LUE)   General   Chart Reviewed Yes   Response to Previous Treatment Patient reporting fatigue but able to participate     Family/Caregiver Present No   Subjective   Subjective Willing to participate in therapy this AM    Transfers   Sit to Stand 4  Minimal assistance   Additional items Assist x 2;Armrests; Increased time required;Verbal cues   Stand to Sit 4  Minimal assistance   Additional items Assist x 2;Armrests; Increased time required;Verbal cues   Toilet transfer 4  Minimal assistance   Additional items Assist x 2;Armrests; Increased time required;Verbal cues; Commode   Ambulation/Elevation   Gait pattern Decreased foot clearance; Short stride  (hop c RW)   Gait Assistance 4  Minimal assist   Additional items Assist x 2;Verbal cues; Tactile cues; Other (Comment)  (with 3rd present for close chair follow)   Assistive Device Rolling walker;L platform   Distance 5'   Balance   Static Sitting Fair   Dynamic Sitting Fair -   Static Standing Poor +   Dynamic Standing Poor   Ambulatory Poor   Endurance Deficit   Endurance Deficit Yes   Endurance Deficit Description pain/fatigue   Activity Tolerance   Activity Tolerance Patient tolerated treatment well   Medical Staff Made Aware Rena Herrera 78    Nurse Made Aware yes   Exercises   THR Supine;Sitting;10 reps;AAROM; Bilateral   Assessment   Prognosis Good   Problem List Decreased strength;Decreased range of motion;Decreased endurance; Impaired balance;Decreased mobility; Decreased safety awareness; Obesity; Decreased skin integrity;Orthopedic restrictions;Pain   Assessment Pt  seated on bedside commode upon my arrival, with OTR and OTS present  Performance of transfers being able to complete with A of 2 and cues provided for hand placement/technique  Pt  able to maintain appropriate WBing restrictions throughout treatment session  Pt  able to progress with an increased amb  trial with use of RW and A of 2 with cues provided for LE sequencing and 3rd present for a close chair follow  Pt  returned to seated in bedside chair with ice applied and LUE elevated on pillow at end of treatment session   BP measured seated in bedside chair at 129/81, RN aware at end of treatment session  PT will continue to recommend d/c to rehab when medically stable for continued improvement of noted impairments  04/07/21 0900    OT Last Visit   OT Visit Date 04/07/21   Note Type   Note Type Treatment   Restrictions/Precautions   Weight Bearing Precautions Per Order Yes   LUE Weight Bearing Per Order NWB   RLE Weight Bearing Per Order NWB   Braces or Orthoses LE Immobilizer;Splint  (HKB locked in extension RLE; sugar tong splint LUE)   Other Precautions WBS; Fall Risk;Pain   Pain Assessment   Pain Assessment Tool 0-10   Pain Score 5   Pain Location/Orientation Orientation: Right;Location: Leg   Hospital Pain Intervention(s) Repositioned;Cold applied; Emotional support; Ambulation/increased activity   ADL   Where Assessed Chair   Eating Assistance 6  Modified independent   Eating Deficit Increased time to complete   Eating Comments Pt completed beverage management tasks c straw   Grooming Assistance 4  Minimal Assistance   Grooming Deficit Setup; Increased time to complete; Teeth care   Grooming Comments Pt completed brushing hair, brushing teeth, washing face c A for setup including opening toothpaste  UB Bathing Assistance 4  Minimal Assistance   UB Bathing Deficit Increased time to complete;Setup;Right arm  (A c back cleaning/drying)   UB Bathing Comments Pt completed UB bathing c A for back and R arm   LB Bathing Assistance 3  Moderate Assistance   LB Bathing Deficit Left lower leg including foot;Right lower leg including foot; Increased time to complete;Supervision/safety;Setup;Perineal area   LB Bathing Comments Pt completed bathing tasks c A for standing/steadying during pericare  Pt also required A for initial setup of bathing supplies  UB Dressing Assistance 4  Minimal Assistance   UB Dressing Deficit Thread LUE; Increased time to complete;Supervision/safety;Setup;Verbal cueing; Fasteners   UB Dressing Comments Pt completed UE dressing task c A for managing fasteners/tying gown   LB Dressing Assistance 1  Total Assistance   LB Dressing Deficit Don/doff L sock; Increased time to complete;Setup;Verbal cueing;Supervision/safety   Toileting Assistance  4  Minimal Assistance   Toileting Deficit Setup;Steadying;Verbal cueing;Supervison/safety; Increased time to complete; Bedside commode;Clothing management up;Clothing management down   Toileting Comments Used platform walker; Pt completed toileting tasks including pericare and demonstrating safe technique while adhering to WBS  Pt required A for clothing management, steadying while upright to clean, and for setup  Functional Standing Tolerance   Time 2-3 minutes   Activity Pt participated in bathing task, functional transfers, and toileting tasks using a platform walker c Min A for steadying, safety, and cueing for hand placement  Comments Demonstrated adherence to WBS throughout   Bed Mobility   Rolling R 4  Minimal assistance   Additional items Assist x 2; Increased time required;Verbal cues;LE management;HOB elevated   Supine to Sit 4  Minimal assistance   Additional items Assist x 2; Increased time required;Verbal cues;LE management   Sit to Supine Unable to assess  (Pt was OOB in chair post OT session)   Transfers   Sit to Stand 4  Minimal assistance   Additional items Assist x 2; Increased time required;Verbal cues;Armrests  (Platform RW)   Stand to Sit 4  Minimal assistance   Additional items Assist x 2; Increased time required;Armrests; Verbal cues  (Platform RW)   Additional Comments EH=150/64 (EOB), 123/64 (on commode post standing transfer), 129/81 (in chair post ambulation); HR=89-99bpm; SpO2=% on RA; nsg aware   Functional Mobility   Functional Mobility 4  Minimal assistance   Additional Comments x2 for safety and stability +1 c chair to follow   Additional items Rolling walker  (Platform walker attachment for L UE)   Coordination   Gross Motor Intact; able to perform ADL tasks   Fine Motor Intact;  Pt demonstrated some weakness as she required A for opening containers during ADL tasks, but demonstrated ability to grasp toothbrush, apply toothpast, and manipulate brush/comb when combing hair/brushing teeth   Cognition   Overall Cognitive Status [facility]   Arousal/Participation Alert; Responsive; Cooperative   Attention Within functional limits   Orientation Level Oriented X4   Memory Within functional limits   Following Commands Follows all commands and directions without difficulty   Additional Activities   Additional Activities Other (Comment)  (Education for WBS c ADL participation)   Activity Tolerance   Activity Tolerance Patient limited by fatigue;Patient limited by pain; Patient tolerated treatment well   Medical Staff Made Aware TRINITY Healy, Nsg Joe amato   Assessment   Assessment Pt was seen by OT for a 55-minute tx session with focus on bed mobility, functional balance, functional mobility, toileting, activity tolerance, ADL status, and functional cognition  Pt demonstrated G/F+ sitting balance (EOB) and P+/P standing balance, min A x2 sit<>stand, and min A x2 with ambulation using L Platform RW +1 c chair to follow with cueing throughout for safety and positioning  BP= 110/64(EOB), 123/64 (on commode), 129/81(in chair post amb)  SpO2= 97-99% on RA; Nsg aware  Pt demonstrated improved activity tolerance, functional mobility, and functional balance requiring A c UE and LE ADLs  Pt participated in feeding, bathing, dressing, toileting, and grooming ADL tasks requiring A for setup and stability during functional transfers  Pt participated in functional mobility activity using platform walker c 1 rest break and verbal cues for technique and safety  Pt demonstrated G balance using platform walker with adherence to WBS throughout  Pt's current deviation from baseline ADL status indicates appropriateness to continue OT tx  Currently recommending d/c to STR when medically appropriate  Will continue to follow pt on OT caseload                  CURRENT GAP IN FUNCTION  Prior to Admission: Functional Status: Patient was independent with mobility/ambulation, transfers, ADL's, IADL's  Estimated length of stay: 10 to 14 days    Anticipated Post-Discharge Disposition/Treatment  Disposition: Return to previous home/apartment  Also able to stay with daughter or have family support upon discharge   Outpatient Services: Physical Therapy (PT), Occupational Therapy (OT) and Speech Therapy    BARRIERS TO DISCHARGE  Weakness, Pain, Balance Difficulty, Dizziness, Fatigue, Caregiver Accessibility, Equipment Needs and Lives Alone    INTERVENTIONS FOR DISCHARGE  Adaptive equipment, Patient/Family/Caregiver Education, Arrange DME needs, Home Modifcations, Therapy exercises and Energy conservation education     REQUIRED THERAPY:  Patient will require PT and OT 90 minutes each per day, five days per week to achieve rehab goals  REQUIRED FUNCTIONAL AND MEDICAL MANAGEMENT FOR INPATIENT REHABILITATION:  Skin:  There are no pressure sores currently, Pain Management: Overall pain is moderately controlled, Deep Vein Thrombosis (DVT) Prophylaxis:  heparin  Patient will require ursing education, bowel/bladder management, Internal medicine to monitor and manage medical conditions, PM&R to maximize function and provide medical oversight of rehabilitation program   Patient will also require continue PT/OT interventions, patient/family education and training and additional consults as needed  RECOMMENDED LEVEL OF CARE: Ruba Chino is a 63 y o  female with PMH of hypothyroidism, GERD, who recently fell while getting out of her car  in which she apparently fell on her face and hands, hitting her head with brief LOC followed by pain in L wrist and R leg who presented to hospital unable to ambulate  CT head showed mild frontal/forehead hematoma without acute intracranial findings  Imaging also revealed R comminuted nondisplaced tibial plateau fracture and left distal radius fracture  Orthopedics consulted and recommended NWB of R leg and L wrist   She is to use sugar tong splint for wrist fracture and TROM brace locked in extension for R leg  Patient may WB thru L elbow and use platform walker for ambulation  Patient recommended ortho follow-up with repeat x-rays in about 1 week (around 4/12)  Course also notable for some dizziness and orthostatic hypotension and she received IVF  Prior to admission, patient was fully independent and active  Patient worked two jobs and was independent with all aspects of functional mobility, ADLs and IADLS  Patient currently requires Min A x 2 for transfers and ambulation/hopping with use of platform walker up to 5 feet  Patient is able to feed herself with set up, she can complete grooming, UB bathing and UB dressing with Min A, she requires Mod A with LB bathing and Total A with LB dressing  Patient also requires Min A with all aspects of toileting  Patient will require continued PT/OT interventions, nursing education and care for adriano/bladder training, internal medicine to monitor medical conditons, PM&R to maximize function and provide medical oversight of rehabilitation program   In-patient rehab is required to maximize self-care, mobility, strength, endurance and balance for safe discharge home with [facility] services and family support

## 2021-04-07 NOTE — ASSESSMENT & PLAN NOTE
4/3 - R tibial plateau fracture and L nondisplaced wrist fracture s/p mechanical fall   + Head strike, LOC  CT head - no acute abnormalities  Pain management  L wrist NWB, Sugar tong splint  RLE NWB, TROM brace locked in extension  Platform walker on L - allowed to bear weight on L elbow  Follow-up with Ortho (Dr Lindalee Cockayne) in 1 week with repeat x-rays  (4/12)    PT/OT Therapies  Primary team following

## 2021-04-07 NOTE — PLAN OF CARE
Problem: OCCUPATIONAL THERAPY ADULT  Goal: Performs self-care activities at highest level of function for planned discharge setting  See evaluation for individualized goals  Description: Treatment Interventions: ADL retraining, Functional transfer training, UE strengthening/ROM, Endurance training, Cognitive reorientation, Patient/family training, Equipment evaluation/education, Compensatory technique education, Fine motor coordination activities, Energy conservation, Activityengagement, Continued evaluation          See flowsheet documentation for full assessment, interventions and recommendations  Outcome: Progressing  Note: Limitation: Decreased ADL status, Decreased UE strength, Decreased Safe judgement during ADL, Decreased cognition, Decreased endurance, Decreased self-care trans, Decreased high-level ADLs(NWB LUE & RLE)  Prognosis: Good  Assessment: Pt was seen by OT for a 55-minute tx session with focus on bed mobility, functional balance, functional mobility, toileting, activity tolerance, ADL status, and functional cognition  Pt demonstrated G/F+ sitting balance (EOB) and P+/P standing balance, min A x2 sit<>stand, and min A x2 with ambulation using L Platform RW +1 c chair to follow with cueing throughout for safety and positioning  BP= 110/64(EOB), 123/64 (on commode), 129/81(in chair post amb)  SpO2= 97-99% on RA; Nsg aware  Pt demonstrated improved activity tolerance, functional mobility, and functional balance requiring A c UE and LE ADLs  Pt participated in feeding, bathing, dressing, toileting, and grooming ADL tasks requiring A for setup and stability during functional transfers  Pt participated in functional mobility activity using platform walker c 1 rest break and verbal cues for technique and safety  Pt demonstrated G balance using platform walker with adherence to WBS throughout  Pt's current deviation from baseline ADL status indicates appropriateness to continue OT tx   Currently recommending d/c to STR when medically appropriate  Will continue to follow pt on OT caseload        OT Discharge Recommendation: Post-Acute Rehabilitation Services  OT - OK to Discharge: Yes(Recommend d/c to STR when medically appropriate)

## 2021-04-07 NOTE — PLAN OF CARE
Problem: PAIN - ADULT  Goal: Verbalizes/displays adequate comfort level or baseline comfort level  Description: Interventions:  - Encourage patient to monitor pain and request assistance  - Assess pain using appropriate pain scale  - Administer analgesics based on type and severity of pain and evaluate response  - Implement non-pharmacological measures as appropriate and evaluate response  - Consider cultural and social influences on pain and pain management  - Notify physician/advanced practitioner if interventions unsuccessful or patient reports new pain  Outcome: Progressing     Problem: SAFETY ADULT  Goal: Patient will remain free of falls  Description: INTERVENTIONS:  - Assess patient frequently for physical needs  -  Identify cognitive and physical deficits and behaviors that affect risk of falls    -  [place] fall precautions as indicated by assessment   - Educate patient/family on patient safety including physical limitations  - Instruct patient to call for assistance with activity based on assessment  - Modify environment to reduce risk of injury  - Consider OT/PT consult to assist with strengthening/mobility  Outcome: Progressing  Goal: Maintain or return to baseline ADL function  Description: INTERVENTIONS:  -  Assess patient's ability to carry out ADLs; assess patient's baseline for ADL function and identify physical deficits which impact ability to perform ADLs (bathing, care of mouth/teeth, toileting, grooming, dressing, etc )  - Assess/evaluate cause of self-care deficits   - Assess range of motion  - Assess patient's mobility; develop plan if impaired  - Assess patient's need for assistive devices and provide as appropriate  - Encourage maximum independence but intervene and supervise when necessary  - Involve family in performance of ADLs  - Assess for home care needs following discharge   - Consider OT consult to assist with ADL evaluation and planning for discharge  - Provide patient education as appropriate  Outcome: Progressing  Goal: Maintain or return mobility status to optimal level  Description: INTERVENTIONS:  - Assess patient's baseline mobility status (ambulation, transfers, stairs, etc )    - Identify cognitive and physical deficits and behaviors that affect mobility  - Identify mobility aids required to assist with transfers and/or ambulation (gait belt, sit-to-stand, lift, walker, cane, etc )  - [place] fall precautions as indicated by assessment  - Record patient progress and toleration of activity level on Mobility SBAR; progress patient to next Phase/Stage  - Instruct patient to call for assistance with activity based on assessment  - Consider rehabilitation consult to assist with strengthening/weightbearing, etc   Outcome: Progressing     Problem: Potential for Falls  Goal: Patient will remain free of falls  Description: INTERVENTIONS:  - Assess patient frequently for physical needs  -  Identify cognitive and physical deficits and behaviors that affect risk of falls    -  [place] fall precautions as indicated by assessment   - Educate patient/family on patient safety including physical limitations  - Instruct patient to call for assistance with activity based on assessment  - Modify environment to reduce risk of injury  - Consider OT/PT consult to assist with strengthening/mobility  Outcome: Progressing

## 2021-04-07 NOTE — CONSULTS
[address] 1965, 63 y o  female MRN: [id number]  Unit/Bed#: -79 Encounter: [id number]  Primary Care Provider: Bj Linda MD   Date and time admitted to hospital: 4/7/2021  4:05 PM    Inpatient consult to Internal Medicine  Consult performed by: ELEUTERIO Renner  Consult ordered by: Ruma Fernando MD          Thrombocytopenia [facility])  Assessment & Plan  Plt count 121 on 4/6   165 on 4/3  Asymptomatic  Currently on lovenox injections  No acute s/s of bleeding  Repeat CBC tomorrow  Vitamin D deficiency  Assessment & Plan  Last Vitamin D level was 14 in 2019  Repeat Vitamin D level with routine labs tomorrow  Dizziness  Assessment & Plan  Experienced orthostasis in the acute setting that resolved after IV fluids  Monitor BP with routine VS   Encourage fluid intake  Obtain orthostatic BPs  Obesity  Assessment & Plan  Nutritional counseling as needed  Gastric sleeve surgery 5 years ago  Recently started to gain weight, Endocrine ordered Contrave for patient, but she would prefer nutritional counseling  Has an appointment to follow-up with Weight Management  Left wrist distal radius nondisplaced fracture  Assessment & Plan  4/3 - R tibial plateau fracture and L nondisplaced wrist fracture s/p mechanical fall   + Head strike, LOC  CT head - no acute abnormalities  L wrist NWB, Sugar tong splint  Platform walker on L - allowed to bear weight on L elbow  Pain management  Follow-up with Ortho (Dr Tere Lam) in 1 week with repeat x-rays  (4/12)    PT/OT Therapies  Primary team following  GERD (gastroesophageal reflux disease)  Assessment & Plan  Takes Pepcid 20mg daily at home  Non-formulary  Continue with Protonix 20mg daily  Hypothyroidism  Assessment & Plan  Currently taking levothyroxine 175mcg  Dose decreased by Endocrinology in March  Last TSH was 0 14 and T4 was 1 57 on 1/28    Continue to follow-up with Endocrinology as outpatient  Tibial plateau fracture  Assessment & Plan  4/3 - R tibial plateau fracture and L nondisplaced wrist fracture s/p mechanical fall   + Head strike, LOC  CT head - no acute abnormalities  Pain management  L wrist NWB, Sugar tong splint  RLE NWB, TROM brace locked in extension  Platform walker on L - allowed to bear weight on L elbow  Follow-up with Ortho (Dr Nicole Montalvo) in 1 week with repeat x-rays  (4/12)    PT/OT Therapies  Primary team following  History of Present Illness:    Stevie Gates is a 63 y o  female with a PMH of hypothyroidism, GERD, and obesity who originally presented to [address] [place] [address] on 4/3/2021 after a mechanical fall while getting out of the car  The patient hit her head on the curb and had a brief moment of loss of consciousness along with persistent L wrist and R knee pain  X-rays showed acute nondisplaced, nondepressed R tibial plateau fracture and L acute, nondisplaced distal radial metaphysis fracture  CT head showed no acute intracranial abnormality but did show mild left anterior frontal/forehead subgaleal hematoma  Ortho was consulted and had patient placed in sugar tong splint of the L wrist and TROM brace of the R knee that is locked in extension  Patient is to be NWB to L wrist and RLE  Plans to use platform walker on the L with weight bearing through the L elbow and follow-up with Dr Nicole Montalvo in 1 week as outpatient with repeat x-rays  In acute setting patient did experience orthostasis that resolved with IVF administration  Patient admitted to [facility] on 4/7/2021; we are consulted for medical clearance  Pt examined while pt sitting in bed in pt room  Complaints of 8/10 R leg pain, throbbing, worse currently due to transfer into bed   6/10 L wrist pain, aching/throbbing, improves with pain medication    Pt states that she has not taken any pain medication since last night, recommended that she takes some now especially after transferring into a new bed  Had not had a BM for a week, but finally had a BM this morning  States that it was normal, advised that she may need to consider stool softeners while she is not as mobile  Reviewed medications and medical history  Lives at home with her son, but states that she cannot really rely on him for help  Daughter is an OT but appears to be busy at home  Pt states that her goal is to be independent by discharge  Review of Systems:    Review of Systems   Constitutional: Negative for chills, fatigue and fever  Respiratory: Negative for cough and shortness of breath  Cardiovascular: Negative for chest pain, palpitations and leg swelling  Gastrointestinal: Negative for abdominal distention, abdominal pain, constipation, diarrhea, nausea and vomiting  LBM 4/7   Endocrine: Negative for cold intolerance and heat intolerance  Genitourinary: Negative for difficulty urinating  Musculoskeletal: Positive for arthralgias (8/10 R leg pain, throbbing, worse right after transferring  6/10 L wrist pain, aching/throbbing)  Neurological: Negative for dizziness, light-headedness and numbness  Psychiatric/Behavioral: Negative for sleep disturbance         Past Medical and Surgical History:     Past Medical History:   Diagnosis Date    Disease of thyroid gland        Past Surgical History:   Procedure Laterality Date    GASTRIC BYPASS      HERNIA REPAIR         Meds/Allergies:    all medications and allergies reviewed    Allergies: No Known Allergies    Social History:     Marital Status: /Civil Union    Substance Use History:   Social History     Substance and Sexual Activity   Alcohol Use Not Currently    Frequency: Never    Drinks per session: Patient refused    Binge frequency: Never     Social History     Tobacco Use   Smoking Status Never Smoker   Smokeless Tobacco Never Used     Social History     Substance and Sexual Activity   Drug Use No       Family History:  Reviewed    Physical Exam:     Vitals:   Blood Pressure: 125/66 (04/07/21 1605)  Pulse: 87 (04/07/21 1605)  Temperature: 98 4 °F (36 9 °C) (04/07/21 1605)  Temp Source: Tympanic (04/07/21 1605)  Respirations: 18 (04/07/21 1605)  Height: 5' 5" (165 1 cm) (04/07/21 1605)  Weight - Scale: 102 kg (224 lb 4 8 oz)(with leg immobilizer) (04/07/21 1605)  SpO2: 100 % (04/07/21 1605)    Physical Exam  Vitals signs and nursing note reviewed  Constitutional:       Appearance: Normal appearance  She is obese  HENT:      Head: Normocephalic and atraumatic  Cardiovascular:      Rate and Rhythm: Normal rate and regular rhythm  Pulses: Normal pulses  Heart sounds: Normal heart sounds  No murmur  No friction rub  Pulmonary:      Effort: Pulmonary effort is normal  No respiratory distress  Breath sounds: Normal breath sounds  No wheezing or rhonchi  Abdominal:      General: Abdomen is flat  Bowel sounds are normal  There is no distension  Palpations: Abdomen is soft  Tenderness: There is no abdominal tenderness  Musculoskeletal:      Right lower leg: Edema (Minimal edema surrounding R knee) present  Left lower leg: No edema  Comments: R leg immobilizer and L wrist splint in place  Skin:     General: Skin is warm and dry  Capillary Refill: Capillary refill takes less than 2 seconds  Comments: Small abrasion present on L side of forehead above the eyebrow  No drainage, erythema, or edema present  Neurological:      Mental Status: She is alert and oriented to person, place, and time  Psychiatric:         Mood and Affect: Mood normal          Behavior: Behavior normal            Additional Data:     Labs and imaging reviewed  EKG Reviewed - last EKG on 4/5 showed NSR with QTc of 420  M*Modal software was used to dictate this note  It may contain errors with dictating incorrect words or incorrect spelling   Please contact the provider directly with any questions

## 2021-04-07 NOTE — OCCUPATIONAL THERAPY NOTE
OccupationalTherapy Progress Note (Time= 0564-9407)     Patient Name: Marc Graham  BCZMH'F Date: 4/7/2021  Problem List  Principal Problem:    Tibial plateau fracture  Active Problems:    Ambulatory dysfunction    Hypothyroidism    GERD (gastroesophageal reflux disease)    Left wrist distal radius nondisplaced fracture    Obesity    Dizziness       04/07/21 0900   OT Last Visit   OT Visit Date 04/07/21   Note Type   Note Type Treatment   Restrictions/Precautions   Weight Bearing Precautions Per Order Yes   LUE Weight Bearing Per Order NWB   RLE Weight Bearing Per Order NWB   Braces or Orthoses LE Immobilizer;Splint  (HKB locked in extension RLE; sugar tong splint LUE)   Other Precautions WBS; Fall Risk;Pain   Pain Assessment   Pain Assessment Tool 0-10   Pain Score 5   Pain Location/Orientation Orientation: Right;Location: Leg   Hospital Pain Intervention(s) Repositioned;Cold applied; Emotional support; Ambulation/increased activity   ADL   Where Assessed Chair   Eating Assistance 6  Modified independent   Eating Deficit Increased time to complete   Eating Comments Pt completed beverage management tasks c straw   Grooming Assistance 4  Minimal Assistance   Grooming Deficit Setup; Increased time to complete; Teeth care   Grooming Comments Pt completed brushing hair, brushing teeth, washing face c A for setup including opening toothpaste  UB Bathing Assistance 4  Minimal Assistance   UB Bathing Deficit Increased time to complete;Setup;Right arm  (A c back cleaning/drying)   UB Bathing Comments Pt completed UB bathing c A for back and R arm   LB Bathing Assistance 3  Moderate Assistance   LB Bathing Deficit Left lower leg including foot;Right lower leg including foot; Increased time to complete;Supervision/safety;Setup;Perineal area   LB Bathing Comments Pt completed bathing tasks c A for standing/steadying during pericare  Pt also required A for initial setup of bathing supplies     UB Dressing Assistance 4  Minimal Assistance   UB Dressing Deficit Thread LUE; Increased time to complete;Supervision/safety;Setup;Verbal cueing; Fasteners   UB Dressing Comments Pt completed UE dressing task c A for managing fasteners/tying gown   LB Dressing Assistance 1  Total Assistance   LB Dressing Deficit Don/doff L sock; Increased time to complete;Setup;Verbal cueing;Supervision/safety   Toileting Assistance  4  Minimal Assistance   Toileting Deficit Setup;Steadying;Verbal cueing;Supervison/safety; Increased time to complete; Bedside commode;Clothing management up;Clothing management down   Toileting Comments Used platform walker; Pt completed toileting tasks including pericare and demonstrating safe technique while adhering to WBS  Pt required A for clothing management, steadying while upright to clean, and for setup  Functional Standing Tolerance   Time 2-3 minutes   Activity Pt participated in bathing task, functional transfers, and toileting tasks using a platform walker c Min A for steadying, safety, and cueing for hand placement  Comments Demonstrated adherence to WBS throughout   Bed Mobility   Rolling R 4  Minimal assistance   Additional items Assist x 2; Increased time required;Verbal cues;LE management;HOB elevated   Supine to Sit 4  Minimal assistance   Additional items Assist x 2; Increased time required;Verbal cues;LE management   Sit to Supine Unable to assess  (Pt was OOB in chair post OT session)   Transfers   Sit to Stand 4  Minimal assistance   Additional items Assist x 2; Increased time required;Verbal cues;Armrests  (Platform RW)   Stand to Sit 4  Minimal assistance   Additional items Assist x 2; Increased time required;Armrests; Verbal cues  (Platform RW)   Additional Comments KG=685/64 (EOB), 123/64 (on commode post standing transfer), 129/81 (in chair post ambulation); HR=89-99bpm; SpO2=% on RA; nsg aware   Functional Mobility   Functional Mobility 4  Minimal assistance   Additional Comments x2 for safety and stability +1 c chair to follow   Additional items Rolling walker  (Platform walker attachment for L UE)   Coordination   Gross Motor Intact; able to perform ADL tasks   Fine Motor Intact; Pt demonstrated some weakness as she required A for opening containers during ADL tasks, but demonstrated ability to grasp toothbrush, apply toothpast, and manipulate brush/comb when combing hair/brushing teeth   Cognition   Overall Cognitive Status [facility]   Arousal/Participation Alert; Responsive; Cooperative   Attention Within functional limits   Orientation Level Oriented X4   Memory Within functional limits   Following Commands Follows all commands and directions without difficulty   Additional Activities   Additional Activities Other (Comment)  (Education for WBS c ADL participation)   Activity Tolerance   Activity Tolerance Patient limited by fatigue;Patient limited by pain; Patient tolerated treatment well   Medical Staff Made Aware TRINITY Galeas, Nsg Leda Hou   Assessment   Assessment Pt was seen by OT for a 55-minute tx session with focus on bed mobility, functional balance, functional mobility, toileting, activity tolerance, ADL status, and functional cognition  Pt demonstrated G/F+ sitting balance (EOB) and P+/P standing balance, min A x2 sit<>stand, and min A x2 with ambulation using L Platform RW +1 c chair to follow with cueing throughout for safety and positioning  BP= 110/64(EOB), 123/64 (on commode), 129/81(in chair post amb)  SpO2= 97-99% on RA; Nsg aware  Pt demonstrated improved activity tolerance, functional mobility, and functional balance requiring A c UE and LE ADLs  Pt participated in feeding, bathing, dressing, toileting, and grooming ADL tasks requiring A for setup and stability during functional transfers  Pt participated in functional mobility activity using platform walker c 1 rest break and verbal cues for technique and safety  Pt demonstrated G balance using platform walker with adherence to WBS throughout  Pt's current deviation from baseline ADL status indicates appropriateness to continue OT tx  Currently recommending d/c to STR when medically appropriate  Will continue to follow pt on OT caseload  Plan   Treatment Interventions ADL retraining;Functional transfer training;UE strengthening/ROM; Endurance training;Cognitive reorientation;Patient/family training;Equipment evaluation/education; Compensatory technique education; Energy conservation; Activityengagement   Goal Expiration Date 04/19/21   OT Treatment Day 1   OT Frequency 3-5x/wk   Recommendation   OT Discharge Recommendation Post-Acute Rehabilitation Services   OT - OK to Discharge Yes  (Recommend d/c to STR when medically appropriate)   AM-PAC Daily Activity Inpatient   Lower Body Dressing 1   Bathing 2   Toileting 2   Upper Body Dressing 3   Grooming 3   Eating 4   Daily Activity Raw Score 15   Daily Activity Standardized Score (Calc for Raw Score >=11) 34 69   Bryan Morse

## 2021-04-07 NOTE — ASSESSMENT & PLAN NOTE
Comminuted, nondisplaced RT  tibial plateau fracture  Plan for conservative treatment and non operative management  Fitted with a TROM brace right knee locked in extension    Patient should maintain nonweightbearing onright leg and left wrist  Continue with ice and analgesics as needed  Follow up with Ortho in 1 week  Accepted to [facility]

## 2021-04-07 NOTE — UTILIZATION REVIEW
Notification of Discharge  This is a Notification of Discharge from our facility [address]  Please be advised that this patient has been discharge from our facility  Below you will find the admission and discharge date and time including the patients disposition  PRESENTATION DATE: 4/3/2021  5:31 PM  OBS ADMISSION DATE: 04/03/2021  IP ADMISSION DATE: 4/5/21 1632   DISCHARGE DATE: 4/7/2021  3:39 PM  DISPOSITION: [address] [place] [address] Dr [facility]   Admission Orders listed below:  Admission Orders (From admission, onward)     Ordered        04/05/21 1632  Inpatient Admission  Once         04/03/21 2115  Place in Observation  Once         04/03/21 2116  Place in Observation  Once                   Please contact the UR Department if additional information is required to close this patient's authorization/case  [address]  [facility] Utilization Review Department  Main: [phone] x carefully listen to the prompts  All voicemails are confidential   [email or web address]  Send all requests for admission clinical reviews, approved or denied determinations and any other requests to dedicated fax number below belonging to the campus where the patient is receiving treatment   List of dedicated fax numbers:  [address] DENIALS (Administrative/Medical Necessity) [phone]   [address] [place] [address] (Maternity/NICU/Pediatrics) [phone]   Radha Meza [phone]   Johann Dickerson [phone]   Madeleine Hirsch [phone]   [address] [phone]   [facility]  [phone]   [address] [facility] [address] [facility]  [address] [facility] [address] [facility] [phone]

## 2021-04-07 NOTE — ASSESSMENT & PLAN NOTE
Experienced orthostasis in the acute setting that resolved after IV fluids  Monitor BP with routine VS   Encourage fluid intake  Obtain orthostatic BPs

## 2021-04-07 NOTE — H&P
PHYSICAL MEDICINE AND REHABILITATION H&P/ADMISSION NOTE  Claudio Espinoza 63 y o  female MRN: [id number]  Unit/Bed#: -01 Encounter: [id number]     Rehab Diagnosis: Impairment of mobility, safety and Activities of Daily Living (ADLs) due to Orthopedic Disorders:  08 9  Other Orthopedic Right tibial plateu fracture, left radius fracture    History of Present Illness:   Claudio Espinoza is a 63 y o  female with hypothyroidism, GERD, gastric sleeve, who presented to the [facility] on 4/3/21 status post traumatic fall upon exiting her car  Sustained a right tibial plateau comminuted nondisplaced fracture, left least distal radius fracture  Evaluated by Orthopedics and treated nonsurgically  Deemed nonweightbearing right lower extremity as well as left wrist (allowed to use platform walker at left elbow)  Placed in a hinged knee brace locked in extension and a sugar-tong splint for the left upper extremity  Patient will require repeat imaging in 1 week  Medically, patient was found to have some orthostasis which was symptomatic and IV fluids  Patient with mild anemia and thrombocytopenia which was followed  Patient also treated for acute nociceptive pain  Patient found to have acute functional her baseline given her nonweightbearing status, and after clearance and stabilization was admitted to [facility] for acute inpatient rehabilitation  Plan:     Rehabilitation   Functional deficits: right lower extremity weakness, left upper extremity weakness, decreased endurance, impaired mobility, self care   Begin PT/OT  Rehabilitation goals are to achieve a supervision-minimal assist level with mobility and self care with least restrictive assistive devices  Prognosis is good  ELOS is 2 weeks  Estimated discharge is home  DVT prophylaxis   Managed on subcutaneous Lovenox    Pain   Managed on p r n  Tylenol, oxycodone IR 2 5 mg-5 mg every 4 hours p r n  for moderate to severe pain   P r n  Flexeril available for muscular spasms   Continue topical ice and elevation of limb    Bladder plan   Continent    Bowel plan   Continent    Code Status   Full code      * Tibial plateau fracture  Assessment & Plan  · Sustained after traumatic fall on 4/3/21  · Treated non-operatively by Orthopedics  · Deemed NWB RLE placed in a hinged knee brace locked in extension  · Follow-up imaging in 1 week (around 4/12/21) recommended per Orthopedics  · Follow-up with Orthopedics as outpatient     Left wrist distal radius nondisplaced fracture  Assessment & Plan  · Sustained after traumatic fall on 4/3/21  · Treated non-operatively by Orthopedics  · Deemed NWB Left wrist - but allowed platform walker at left elbow  · Placed in a sugar-tong splint  · Follow-up imaging recommended in 1 week (around 4/12/21)  · Follow-up with Orthopedics as outpatient  Anemia  Assessment & Plan  · Mild  · Asymptomatic  · Will follow    History of sleeve gastrectomy  Assessment & Plan  · S/p laparascopic sleeve gastrectomy and,cholecystectomy - Dr Lila Roberson @ [facility] on 12/9/15      Thrombocytopenia ([address] )  Assessment & Plan  · Trending up  · Continue to follow    Dizziness  Assessment & Plan  · Resolved  · Was related to mild orthostasis recovered with IV hydration  · In ARC, if needed will use TEDS and abdominal binder    GERD (gastroesophageal reflux disease)  Assessment & Plan  · Managed on Protonix (therapeutic exchange for Omeprazole)  · PRN tums and mylanta    Hypothyroidism  Assessment & Plan  · Managed on Synthroid       Subjective:   Patient seen face to face  Pain is mild in RLE  Also having some abdominal pain  +BM yesterday  Review of Systems   Constitutional: Negative  HENT: Negative  Eyes: Negative  Respiratory: Negative  Cardiovascular: Negative  Gastrointestinal: Negative  Endocrine: Negative  Genitourinary: Negative  Musculoskeletal: Negative  Skin: Negative      Allergic/Immunologic: Negative  Neurological: Negative  Hematological: Negative  Psychiatric/Behavioral: Negative  Function:  Prior level of function and living situation:  Patient resides alone in [place] with 2-3 MICHELLE  Son lives with her but works during day  Has other supportive daughter that lives in area and could possibly assist or have her stay with her  PLOF: Independent   Works at a bank clerk during the day and  at [employer]    Current level of function:  Physical Therapy:  Min - Mod assist  Occupational Therapy:  Min- Mod assist    Physical Exam:  /58 (BP Location: Right arm)   Pulse 75   Temp 97 5 °F (36 4 °C) (Tympanic)   Resp 18   Ht 5' 5" (1 651 m)   Wt 102 kg (224 lb 4 8 oz) Comment: with leg immobilizer  LMP 10/24/2016   SpO2 98%   BMI 37 33 kg/m²        Intake/Output Summary (Last 24 hours) at 4/8/2021 1146  Last data filed at 4/8/2021 0853  Gross per 24 hour   Intake 120 ml   Output --   Net 120 ml       Body mass index is 37 33 kg/m²  Physical Exam  Vitals signs and nursing note reviewed  Constitutional:       General: She is not in acute distress  HENT:      Head: Normocephalic and atraumatic  Nose: Nose normal       Mouth/Throat:      Mouth: Mucous membranes are moist    Eyes:      Conjunctiva/sclera: Conjunctivae normal    Neck:      Musculoskeletal: Neck supple  Cardiovascular:      Rate and Rhythm: Normal rate and regular rhythm  Pulses: Normal pulses  Pulmonary:      Effort: Pulmonary effort is normal       Breath sounds: Normal breath sounds  No wheezing or rales  Abdominal:      General: Bowel sounds are normal  There is no distension  Palpations: Abdomen is soft  Tenderness: There is no abdominal tenderness  Musculoskeletal:         General: No swelling  Comments: +hinged knee brace locked in extension  LUE in splint   Skin:     General: Skin is warm        Comments: Small skin abrasion mid thigh likely from brace   Neurological: Mental Status: She is alert and oriented to person, place, and time  Psychiatric:         Mood and Affect: Mood normal         Labs, medications, and imaging personally reviewed      Laboratory:    Lab Results   Component Value Date    SODIUM 138 04/08/2021    K 4 1 04/08/2021     04/08/2021    CO2 31 (H) 04/08/2021    BUN 15 04/08/2021    CREATININE 0 62 04/08/2021    GLUC 98 04/08/2021    CALCIUM 9 6 04/08/2021     Lab Results   Component Value Date    WBC 4 40 (L) 04/08/2021    HGB 12 1 04/08/2021    HCT 36 5 04/08/2021    MCV 78 (L) 04/08/2021     04/08/2021     No results found for: INR, PROTIME      Current Facility-Administered Medications:     acetaminophen (TYLENOL) tablet 650 mg, 650 mg, Oral, Q6H PRN, Uriel Alston MD, 650 mg at 04/07/21 1640    aluminum-magnesium hydroxide-simethicone (MYLANTA) oral suspension 30 mL, 30 mL, Oral, Q6H PRN, Uriel Alston MD    bisacodyl (DULCOLAX) rectal suppository 10 mg, 10 mg, Rectal, Daily PRN, Uriel Alston MD    calcium carbonate (TUMS) chewable tablet 1,000 mg, 1,000 mg, Oral, Daily PRN, Uriel Alston MD    cyclobenzaprine (FLEXERIL) tablet 5 mg, 5 mg, Oral, TID PRN, Uriel Alston MD    docusate sodium (COLACE) capsule 100 mg, 100 mg, Oral, BID, Uriel Alston MD, 100 mg at 04/08/21 0808    enoxaparin (LOVENOX) subcutaneous injection 40 mg, 40 mg, Subcutaneous, Q24H [address], Uriel Alston MD, 40 mg at 04/08/21 0807    levothyroxine tablet 175 mcg, 175 mcg, Oral, Early Morning, Uriel Alston MD, 175 mcg at 04/08/21 8030    ondansetron (ZOFRAN-ODT) dispersible tablet 4 mg, 4 mg, Oral, Q6H PRN, Uriel Alston MD    oxyCODONE (ROXICODONE) IR tablet 2 5 mg, 2 5 mg, Oral, Q4H PRN, Uriel Alston MD    oxyCODONE (ROXICODONE) IR tablet 5 mg, 5 mg, Oral, Q4H PRN, Uriel Alston MD, 5 mg at 04/07/21 2053    pantoprazole (PROTONIX) EC tablet 20 mg, 20 mg, Oral, Early Morning, Ureil Alston MD, 20 mg at 04/08/21 9840    polyethylene glycol (MIRALAX) packet 17 g, 17 g, Oral, Daily PRN, Angelique Headley MD    simethicone (MYLICON) chewable tablet 80 mg, 80 mg, Oral, 4x Daily PRN, Angelique Headley MD  No Known Allergies   Patient Active Problem List    Diagnosis Date Noted    Tibial plateau fracture 03/64/2330     Priority: High    Left wrist distal radius nondisplaced fracture 04/03/2021     Priority: Medium    Angular cheilitis 04/08/2021    History of sleeve gastrectomy 04/08/2021    Anemia 04/08/2021    Vitamin D deficiency 04/07/2021    Thrombocytopenia ([address] ) 04/07/2021    Dizziness 04/05/2021    Ambulatory dysfunction 04/03/2021    Hypothyroidism 04/03/2021    GERD (gastroesophageal reflux disease) 04/03/2021    Obesity 04/03/2021     Past Medical History:   Diagnosis Date    Disease of thyroid gland      Past Surgical History:   Procedure Laterality Date    GASTRIC BYPASS      HERNIA REPAIR       Social History     Socioeconomic History    Marital status: /Civil Union     Spouse name: Not on file    Number of children: Not on file    Years of education: Not on file    Highest education level: Not on file   Occupational History    Not on file   Social Needs    Financial resource strain: Not on file    Food insecurity     Worry: Not on file     Inability: Not on file   [employer] needs     Medical: Not on file     Non-medical: Not on file   Tobacco Use    Smoking status: Never Smoker    Smokeless tobacco: Never Used   Substance and Sexual Activity    Alcohol use: Not Currently     Frequency: Never     Drinks per session: Patient refused     Binge frequency: Never    Drug use: No    Sexual activity: Not Currently   Lifestyle    Physical activity     Days per week: Not on file     Minutes per session: Not on file    Stress: Not on file   Relationships    Social connections     Talks on phone: Not on file     Gets together: Not on file     Attends Oriental orthodox service: Not on file     Active member of club or organization: Not on file     Attends meetings of clubs or organizations: Not on file     Relationship status: Not on file    Intimate partner violence     Fear of current or ex partner: Not on file     Emotionally abused: Not on file     Physically abused: Not on file     Forced sexual activity: Not on file   Other Topics Concern    Not on file   Social History Narrative    Not on file     Social History     Tobacco Use   Smoking Status Never Smoker   Smokeless Tobacco Never Used     Social History     Substance and Sexual Activity   Alcohol Use Not Currently    Frequency: Never    Drinks per session: Patient refused    Binge frequency: Never     History reviewed  No pertinent family history  Medical Necessity Criteria for ARC Admission: Anemia, with the following plan: monitor H/H, Bowel/Bladder Management, Thrombocytopenia and acute pain  In addition, the preadmission screen, post-admission physical evaluation, overall plan of care and admissions order demonstrate a reasonable expectation that the following criteria were met at the time of admission to the [facility]  1  The patient requires active and ongoing therapeutic intervention of multiple therapy disciplines (physical therapy, occupational therapy, speech-language pathology, or prosthetics/orthotics), one of which is physical or occupational therapy  2  Patient requires an intensive rehabilitation therapy program, as defined in Chapter 1, section 110 2 2 of the CMS Medicare Policy Manual  This intensive rehabilitation therapy program will consist of at least 3 hours of therapy per day at least 5 days per week or at least 15 hours of intensive rehabilitation therapy within a 7 consecutive day period, beginning with the date of admission to the [facility]      3  The patient is reasonably expected to actively participate in, and benefit significantly from, the intensive rehabilitation therapy program as defined in Chapter 1, section 110 2 2 of the CMS Medicare Policy Manual at this time of admission to the [facility]  She can reasonably be expected to make measurable improvement (that will be of practical value to improve the patients functional capacity or adaptation to impairments) as a result of the rehabilitation treatment, as defined in section 110 3, and such improvement can be expected to be made within the prescribed period of time  As noted in the CMS Medicare Policy Manual, the patient need not be expected to achieve complete independence in the domain of self-care nor be expected to return to his or her prior level of functioning in order to meet this standard  4  The patient must require physician supervision by a rehabilitation physician  As such, a rehabilitation physician will conduct face-to-face visits with the patient at least 3 days per week throughout the patients stay in the [facility] to assess the patient both medically and functionally, as well as to modify the course of treatment as needed to maximize the patients capacity to benefit from the rehabilitation process  5  The patient requires an intensive and coordinated interdisciplinary approach to providing rehabilitation, as defined in Chapter 1, section 110 2 5 of the CMS Medicare Policy Manual  This will be achieved through periodic team conferences, conducted at least once in a 7-day period, and comprising of an interdisciplinary team of medical professionals consisting of: a rehabilitation physician, registered nurse,  and/or , and a licensed/certified therapist from each therapy discipline involved in treating the patient  Changes Since Pre-admission Assessment: None -This patient's participation in rehab continues to be reasonable, necessary and appropriate  CMS Required Post-Admission Physician Evaluation Elements  History and Physical, including medical history, functional history and active comorbidities as in above text       Post-Admission Physician Evaluation:  The patient has the potential to make improvement and is in need of physical, occupational, and/or therapy services  The patient may also need nutritional services  Given the patient's complex medical condition and risk of further medical complications, rehabilitative services cannot be safely provided at a lower level of care, such as a skilled nursing facility  I have reviewed the patient's functional and medical status at the time of the preadmission screening and they are the same as on the day of this admission  I acknowledge that I have personally performed a full physical examination on this patient within 24 hours of admission  The patient and/or family demonstrated understanding the rehabilitation program and the discharge process after we discussed them  Agree in entirety: yes  Minor adaptions: none    Major changes: none    Chetan Quinones MD    ** Please Note: Fluency Direct voice to text software may have been used in the creation of this document   **

## 2021-04-07 NOTE — ASSESSMENT & PLAN NOTE
4/3 - R tibial plateau fracture and L nondisplaced wrist fracture s/p mechanical fall   + Head strike, LOC  CT head - no acute abnormalities  L wrist NWB, Sugar tong splint  Platform walker on L - allowed to bear weight on L elbow  Pain management  Follow-up with Ortho (Dr Nicole Montalvo) in 1 week with repeat x-rays  (4/12)    PT/OT Therapies  Primary team following

## 2021-04-07 NOTE — TREATMENT PLAN
Individualized Plan of [address] 63 y o  female MRN: [id number]  Unit/Bed#: -01 Encounter: [id number]     PATIENT INFORMATION  ADMISSION DATE: 4/7/2021  4:05 PM FAUSTINA CATEGORY: Orthopedic fractures   ADMISSION DIAGNOSIS: Tibial plateau fracture [V07 130S]  EXPECTED LOS: 2 weeks      MEDICAL/FUNCTIONAL PROGNOSIS  Based on my assessment of the patient's medical conditions and current functional status, the prognosis for attaining medical and functional goals or the IRF stay is:  Good    Medical Goals: Patient will be medically stable for discharge to [facility] upon completion of rehab program and Patient will be able to manage medical conditions and comorbid conditions with medications and follow up upon completion of rehab program    [address]: Home - Assistance    ANTICIPATED FOLLOW-UP SERVICE:    Home Health Services: PT, OT and Nursing    DISCIPLINE SPECIFIC PLANS:  Required Disciplines & Services: Rehabillitation Nursing and Case Management    REQUIRED THERAPY:  Therapy Hours per Day Days per Week Total Days   Physical Therapy 1 5 5-6 7   Occupational Therapy 1 5 5-6 7   Speech/Language Therapy 0 0 0       ANTICIPATED FUNCTIONAL OUTCOMES:  ADL:  Supervision - Min A with LRAD   Bladder/Bowel:   Supervision - Min A with LRAD   Transfers:   Supervision - Min A with LRAD   Locomotion:   Supervision - Min A with LRAD   Cognitive:       DISCHARGE PLANNING NEEDS  Equipment needs: Discharge needs to be reviewed with team      REHAB ANTICIPATED PARTICIPATION RESTRICTIONS:  Weight Bearing staus - NWB RLE and L wrist

## 2021-04-08 PROBLEM — Z90.3 HISTORY OF SLEEVE GASTRECTOMY: Status: ACTIVE | Noted: 2021-04-08

## 2021-04-08 PROBLEM — K13.0 ANGULAR CHEILITIS: Status: ACTIVE | Noted: 2021-04-08

## 2021-04-08 PROBLEM — D64.9 ANEMIA: Status: ACTIVE | Noted: 2021-04-08

## 2021-04-08 LAB
25(OH)D3 SERPL-MCNC: 12.4 NG/ML (ref 30–100)
ANION GAP SERPL CALCULATED.3IONS-SCNC: 6 MMOL/L (ref 5–14)
BASOPHILS # BLD AUTO: 0 THOUSANDS/ΜL (ref 0–0.1)
BASOPHILS NFR BLD AUTO: 1 % (ref 0–1)
BUN SERPL-MCNC: 15 MG/DL (ref 5–25)
CALCIUM SERPL-MCNC: 9.6 MG/DL (ref 8.4–10.2)
CHLORIDE SERPL-SCNC: 101 MMOL/L (ref 97–108)
CO2 SERPL-SCNC: 31 MMOL/L (ref 22–30)
CREAT SERPL-MCNC: 0.62 MG/DL (ref 0.6–1.2)
EOSINOPHIL # BLD AUTO: 0.1 THOUSAND/ΜL (ref 0–0.4)
EOSINOPHIL NFR BLD AUTO: 2 % (ref 0–6)
ERYTHROCYTE [DISTWIDTH] IN BLOOD BY AUTOMATED COUNT: 13.4 %
GFR SERPL CREATININE-BSD FRML MDRD: 101 ML/MIN/1.73SQ M
GLUCOSE P FAST SERPL-MCNC: 98 MG/DL (ref 70–99)
GLUCOSE SERPL-MCNC: 98 MG/DL (ref 70–99)
HCT VFR BLD AUTO: 36.5 % (ref 36–46)
HGB BLD-MCNC: 12.1 G/DL (ref 12–16)
LYMPHOCYTES # BLD AUTO: 1.5 THOUSANDS/ΜL (ref 0.5–4)
LYMPHOCYTES NFR BLD AUTO: 34 % (ref 25–45)
MCH RBC QN AUTO: 26.1 PG (ref 26–34)
MCHC RBC AUTO-ENTMCNC: 33.3 G/DL (ref 31–36)
MCV RBC AUTO: 78 FL (ref 80–100)
MICROCYTES BLD QL AUTO: PRESENT
MONOCYTES # BLD AUTO: 0.5 THOUSAND/ΜL (ref 0.2–0.9)
MONOCYTES NFR BLD AUTO: 12 % (ref 1–10)
NEUTROPHILS # BLD AUTO: 2.3 THOUSANDS/ΜL (ref 1.8–7.8)
NEUTS SEG NFR BLD AUTO: 52 % (ref 45–65)
PLATELET # BLD AUTO: 158 THOUSANDS/UL (ref 150–450)
PLATELET BLD QL SMEAR: ADEQUATE
PMV BLD AUTO: 9.7 FL (ref 8.9–12.7)
POTASSIUM SERPL-SCNC: 4.1 MMOL/L (ref 3.6–5)
RBC # BLD AUTO: 4.66 MILLION/UL (ref 4–5.2)
RBC MORPH BLD: NORMAL
SODIUM SERPL-SCNC: 138 MMOL/L (ref 137–147)
WBC # BLD AUTO: 4.4 THOUSAND/UL (ref 4.5–11)

## 2021-04-08 PROCEDURE — 99254 IP/OBS CNSLTJ NEW/EST MOD 60: CPT | Performed by: PHYSICAL MEDICINE & REHABILITATION

## 2021-04-08 PROCEDURE — 80048 BASIC METABOLIC PNL TOTAL CA: CPT | Performed by: PHYSICAL MEDICINE & REHABILITATION

## 2021-04-08 PROCEDURE — 82306 VITAMIN D 25 HYDROXY: CPT | Performed by: NURSE PRACTITIONER

## 2021-04-08 PROCEDURE — 97535 SELF CARE MNGMENT TRAINING: CPT

## 2021-04-08 PROCEDURE — 97530 THERAPEUTIC ACTIVITIES: CPT

## 2021-04-08 PROCEDURE — 97163 PT EVAL HIGH COMPLEX 45 MIN: CPT

## 2021-04-08 PROCEDURE — 85025 COMPLETE CBC W/AUTO DIFF WBC: CPT | Performed by: PHYSICAL MEDICINE & REHABILITATION

## 2021-04-08 PROCEDURE — 99232 SBSQ HOSP IP/OBS MODERATE 35: CPT | Performed by: PHYSICIAN ASSISTANT

## 2021-04-08 PROCEDURE — 97165 OT EVAL LOW COMPLEX 30 MIN: CPT

## 2021-04-08 RX ORDER — GINSENG 100 MG
1 CAPSULE ORAL
Status: DISCONTINUED | OUTPATIENT
Start: 2021-04-08 | End: 2021-04-13

## 2021-04-08 RX ADMIN — PANTOPRAZOLE SODIUM 20 MG: 20 TABLET, DELAYED RELEASE ORAL at 06:23

## 2021-04-08 RX ADMIN — DOCUSATE SODIUM 100 MG: 100 CAPSULE ORAL at 17:07

## 2021-04-08 RX ADMIN — ENOXAPARIN SODIUM 40 MG: 40 INJECTION SUBCUTANEOUS at 08:07

## 2021-04-08 RX ADMIN — DOCUSATE SODIUM 100 MG: 100 CAPSULE ORAL at 08:08

## 2021-04-08 RX ADMIN — LEVOTHYROXINE SODIUM 175 MCG: 0.15 TABLET ORAL at 06:23

## 2021-04-08 RX ADMIN — OXYCODONE HYDROCHLORIDE 5 MG: 5 TABLET ORAL at 20:53

## 2021-04-08 RX ADMIN — BACITRACIN 1 SMALL APPLICATION: 500 OINTMENT TOPICAL at 21:01

## 2021-04-08 NOTE — PCC OCCUPATIONAL THERAPY
ADL: UB A for bra clasp, CGA for LB dressing including CM  TRANSFER: CS SPT with PRW  D/C PLAN: anticipate DC  Plan is for pt to dc to dtrs home with FF setup  Pt reports dtr is and OT where she will A pt with ADLs in the AM prior to leaving for work  Pt is making steady gains and is anticipated to achieve Cole for toileting task  Pt barriers include LUE/RLE NWB, dec activity toelrance, dec balance, impacting participation in ADL/IADL's  In order to address fx deficits, skilled OT services have worked on self-care, therapeutic exercise, therapeutic activity,  in order to inc fx performance and independence  Therapist has discussed POC with pt and areas of focus with pt verbalizing understanding

## 2021-04-08 NOTE — PCC NURSING
63 y o  female with a PMH of hypothyroidism, GERD, and obesity who originally presented to [address] on 4/3/2021 after a mechanical fall while getting out of the car  The patient hit her head on the curb and had a brief moment of loss of consciousness along with persistent L wrist and R knee pain  X-rays showed acute nondisplaced, nondepressed R tibial plateau fracture and L acute, nondisplaced distal radial metaphysis fracture  CT head showed no acute intracranial abnormality but did show mild left anterior frontal/forehead subgaleal hematoma  Ortho was consulted and had patient placed in sugar tong splint of the L wrist and TROM brace of the R knee that is locked in extension  Patient is to be NWB to L wrist and RLE  Follow-up with Dr Vandana Cole in 1 week as outpatient with repeat x-rays  Admitted to [address] [place] [address] Str  4/7/21  GERD managed with protonix  Hypothyroidism managed with levothyroxine  Pain managed with tylenol & Oxy IR  Ppx: Lovenox  This week we will monitor pt's lab results & vital signs  Continent of bladder & bowel  Pt is Min A with platform walker to wheelchair  Pt will have safe transfers with use of call bell & hourly rounding to prevent falls  Pt will have adequate pain relief to fully participate in therapies  Pt will understand importance of T/R & off loading weight to prevent pressure injury   We will encourage independence with ADL's

## 2021-04-08 NOTE — ASSESSMENT & PLAN NOTE
· Sustained after traumatic fall on 4/3/21    · Treated non-operatively by Orthopedics  · Deemed NWB RLE placed in a hinged knee brace locked in extension  · Follow-up x-ray completed 4/12/21 - reviewed by Ortho - continue current plan of care  · Follow-up with Orthopedics as outpatient

## 2021-04-08 NOTE — PROGRESS NOTES
[address] Joyce Arellano [facility] OT EVAL     04/08/21 7298   Patient Data   Rehab Impairment Impairment of mobility, safety and Activities of Daily Living (ADLs) due to Orthopedic Disorders:  08 9  Other Orthopedic Right Tibial Plateau Fracture, L Distal Radius Fracture    Etiologic Diagnosis Tibial Plateau Fracture, Wrist Fracture    Date of Onset 04/03/21   Support System   Relationship daughter  (works FT as OT, pt expecting to DC to dtr's home)   Able to provide 24 hour supervision No   Able to provide physical help? Yes   Multiple Support Systems Yes   Support System 2   Relationship 2 Son  (pt lives w/ son)   Able to provide 24 hour supervision 2 No   Able to provide physical help? (2) Yes   Support System 3   Relationship 3 sister   Able to provide 24 hour supervision 3 No   Able to provide physical help? (3) No  (intermittent sup/assist, able to prepare meals)   Home Setup   Type of Home Single Level  (this set up is for pt's own home, pt expecting to DC to dtrs)   Method of Entry Stairs   Number of Stairs 3  (1+1+1)   Number of Stairs in Home 0   First Floor Bathroom Full   First Floor Bathroom Accessibility   (doorway not big enough for platform RW (only 22" wide))   First Floor Setup Available Yes   Available Equipment   (none)   Prior IADL Participation   Meal Preparation Full Participation   Laundry Full Participation   Home Cleaning Full Participation   Prior Level of Function   Self-Care 3  Independent - Patient completed the activities by him/herself, with or without an assistive device, with no assistance from a helper  Indoor-Mobility (Ambulation) 3  Independent - Patient completed the activities by him/herself, with or without an assistive device, with no assistance from a helper  Stairs 3  Independent - Patient completed the activities by him/herself, with or without an assistive device, with no assistance from a helper  Functional Cognition 3   Independent - Patient completed the activities by him/herself, with or without an assistive device, with no assistance from a helper  Prior Device Used Z  None of the above   Falls in the Last Year   Number of falls in the past 12 months 1  (leading to current injury)   Patient Preference   Nick[place]darnell (Patient Preference) Cris   Psychosocial   Psychosocial (WDL) WDL   Patient Behaviors/Mood Calm; Cooperative   Restrictions/Precautions   Precautions Aspiration; Fall Risk;Pain   Weight Bearing Restrictions Yes   LUE Weight Bearing Per Order NWB   RLE Weight Bearing Per Order NWB   Braces or Orthoses LE Immobilizer; Other (Comment)  (RLE TROM locked in exten, RUE sugar tong splint)   Pain Assessment   Pain Assessment Tool 0-10   Pain Score 4   Pain Location/Orientation Orientation: Right;Location: Leg   Hospital Pain Intervention(s) Repositioned; Ambulation/increased activity   Eating Assessment   Type of Assistance Needed Set-up / clean-up   Amount of Physical Assistance Provided No physical assistance   Comment assist to open containers d/t LUE NWB   Eating CARE Score 5   Oral Hygiene   Type of Assistance Needed Set-up / clean-up   Amount of Physical Assistance Provided No physical assistance   Comment set up seated at sink   Oral Hygiene CARE Score 5   Grooming   Able To Initiate Tasks; Acquire Items; Apply Make-up;Comb/Brush Hair;Wash/Dry Face;Brush/Clean Teeth;Wash/Dry Hands   Limitation Noted In Timeliness   Findings seated d/t NWB RLE and LUE, extra time required to complete d/t compenstating strategies   Tub/Shower Transfer   Reason Not Assessed Medical;Sponge Bath  ("pt may not shower" order)   Shower/Bathe Self   Type of Assistance Needed Physical assistance   Amount of Physical Assistance Provided 75% or more   Comment MaxA to complete sponge bath seated EOB   Shower/Bathe Self CARE Score 2   Bathing   Assessed Bath Style Sponge Bath   Anticipated D/C Bath Style Sponge Bath   Able to Gather/Transport No   Able to [employer] Temperature No   Limitations Noted in ROM;Endurance;Balance;Timeliness   Positioning Seated;Standing   Dressing/Undressing Clothing   Type of Assistance Needed Physical assistance   Amount of Physical Assistance Provided 50%-74%   Comment ModA to don bra and pullover shirt while seated   Upper Body Dressing CARE Score 2   Amount of Physical Assistance Provided Total assistance   Comment totalA x1 to thread to knee height and for CM in stance w/ platform RW   Lower Body Dressing CARE Score 1   Putting On/Taking Off Footwear   Type of Assistance Needed Physical assistance   Amount of Physical Assistance Provided Total assistance   Comment total assist to don/doff hospital sock on RLE, sock and sneaker on LLE  Putting On/Taking Off Footwear CARE Score 1   Toileting Hygiene   Type of Assistance Needed Physical assistance   Amount of Physical Assistance Provided Total assistance   Comment Assist x2 for task completion in stance w/ steadying assist provided by a    Toileting Hygiene CARE Score 1   Toilet Transfer   Surface Assessed Standard Commode   Transfer Technique Stand Pivot   Limitations Noted In Balance; Endurance;ROM;Safety;UE Strength;LE Strength   Adaptive Equipment Grab Bar   Type of Assistance Needed Physical assistance   Amount of Physical Assistance Provided Total assistance   Comment Assist x2 using grab bar, platform RW and BSC over toilet  Toilet Transfer CARE Score 1   Toileting   Able to Pull Clothing down no, up no  Able to Manage Clothing Closures No   Manage Hygiene Bladder   Limitations Noted In Balance;Problem Solving; Safety;LE Strength;UE Strength   Lying to Sitting on Side of Bed   Amount of Physical Assistance Provided 50%-74%   Comment ModA HOB flat w/o bedrail   Lying to Sitting on Side of Bed CARE Score 2   Sit to Stand   Type of Assistance Needed Physical assistance   Amount of Physical Assistance Provided Total assistance   Comment Ax2 w/o RW, progressed to Max x1 w/ platform RW   Sit to Stand CARE Score 1 Comprehension   QI: Comprehension 3  Usually Understands: Understands most conversations, but misses some part/intent of message  Requires cues at times to understand  Expression   QI: Expression 3  Exhibits some difficulty with expressing needs and ideas (e g , some words or finishing thoughts) or speech is not clear   RUE Assessment   RUE Assessment WFL   Strength - RUE   R Gross Arm Strength 4/5   LUE Assessment   LUE Assessment X   AROM - LUE   L Shoulder Flexion WFL   L Shoulder Extension WF:   L Shoulder ABduction WFL   L Shoulder ADduction WFL   L Shoulder Internal Rotation WFL   L Shoulder External Rotation WFL   L Elbow Flexion U/A to assess d/t sugar tong brace   L Elbow Extension U/A to assess d/t sugar tong brace   L Wrist Flexion U/A to assess NWB   L Wrist Extension U/A to assess NWB   L Wrist ABduction U/A to assess NWB   L Wrist ADduction U/A to assess NWB   Coordination   Movements are Fluid and Coordinated 0   Coordination and Movement Description antalgic   Sensation   Light Touch No apparent deficits   Cognition   Overall Cognitive Status WFL   Arousal/Participation Alert; Responsive   Attention Within functional limits   Orientation Level Oriented X4   Memory Within functional limits   Following Commands Follows multistep commands with increased time or repetition   Vision   Vision Comments intact w/ glasses   Therapeutic Exercise   Therapeutic Exercise/Activity Trialed STS w/ platform RW, MaxA to complete, pt able to stand and maintain NWB precautions for 3min before becoming fatigued and requiring seated rest break   Discharge Information   Vocational Plan Full Time  (works 2 FT jobs, 1st shift and 3rd shift)   Barriers to Return to [facility]; Endurance   Patient's Discharge Plan Plan to DC to dtr's house where pt will have 1st floor set up with bedroom and half bath  Pt reports this space is WC accessible   She will not have 24/7 sup/assist but intermittent sup/A from dtr, adult son, and sister  Patient's Rehab Expectations "be as IND as possible"   Barriers to Discharge Home Limited Family Support; Safety Considerations;Pain;Decreased Endurance;Decreased Strength  (bathroom not RW accessible in Pt's home)   Impressions Cris Hyde is a 63 y o  female with PMH of hypothyroidism, GERD, who recently fell while getting out of her care in which she apparently fell on her face and hands, hitting her head with brief LOC followed by pain in L wrist and R leg who presented to hospital unable to ambulate   CT head showed mild frontal/forehead hematoma without acute intracranial findings  Imaging also revealed R comminuted nondisplaced tibial plateau fracture and left distal radius fracture   Orthopedics consulted and recommended NWB of R leg and L wrist   She is to use sugar tong splint for wrist fracture and TROM brace locked in extension for R leg   Patient may WB thru L elbow and use platform walker for ambulation   Patient recommended ortho follow-up with repeat x-rays in about 1 week (around 4/12)   Course also notable for some dizziness and orthostatic hypotension and she received IVF  PT presents to [facility] [place] on 4/8 for IPOT eval  Pt currently requires extensive assist up to 2 skilled staff members to safely complete basic ADL and fxnl transfers d/t balance, strength, ROM, and endurance deficits and NWB of LUE and RLE  This is a significant decline in function as PTA pt IND w/ all ADLs/IADLs, driving and 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 ) Pt's home is single level and she lives w/ her adult son, however; pt reports that platform RW will not fit into her bathroom (doorway is 22") and she plans to DC to daughter's house  Pt will not have 24/7 sup/assist  Pt is good candidate for IPOT to reach established OT goals of Mod I-Aura (brace management) at [facility] level in 2 weeks time     OT Therapy Minutes   OT Time In 0830   OT Time Out 1030   OT Total Time (minutes) 120   OT Mode of treatment - Individual (minutes) 120   OT Mode of treatment - Concurrent (minutes) 0   OT Mode of treatment - Group (minutes) 0   OT Mode of treatment - Co-treat (minutes) 0   OT Mode of Treatment - Total time(minutes) 120 minutes   OT Cumulative Minutes 120   Cumulative Minutes   Cumulative therapy minutes 120     Twin Heard, OTR/L

## 2021-04-08 NOTE — PLAN OF CARE
Problem: PAIN - ADULT  Goal: Verbalizes/displays adequate comfort level or baseline comfort level  Description: Interventions:  - Encourage patient to monitor pain and request assistance  - Assess pain using appropriate pain scale  - Administer analgesics based on type and severity of pain and evaluate response  - Implement non-pharmacological measures as appropriate and evaluate response  - Consider cultural and social influences on pain and pain management  - Notify physician/advanced practitioner if interventions unsuccessful or patient reports new pain  Outcome: Progressing     Problem: INFECTION - ADULT  Goal: Absence or prevention of progression during hospitalization  Description: INTERVENTIONS:  - Assess and monitor for signs and symptoms of infection  - Monitor lab/diagnostic results  - Monitor all insertion sites, i e  indwelling lines, tubes, and drains  - Monitor endotracheal if appropriate and nasal secretions for changes in amount and color  - [place] appropriate cooling/warming therapies per order  - Administer medications as ordered  - Instruct and encourage patient and family to use good hand hygiene technique  - Identify and instruct in appropriate isolation precautions for identified infection/condition  Outcome: Progressing  Goal: Absence of fever/infection during neutropenic period  Description: INTERVENTIONS:  - Monitor WBC    Outcome: Progressing     Problem: SAFETY ADULT  Goal: Patient will remain free of falls  Description: INTERVENTIONS:  - Assess patient frequently for physical needs  -  Identify cognitive and physical deficits and behaviors that affect risk of falls    -  [place] fall precautions as indicated by assessment   - Educate patient/family on patient safety including physical limitations  - Instruct patient to call for assistance with activity based on assessment  - Modify environment to reduce risk of injury  - Consider OT/PT consult to assist with strengthening/mobility  Outcome: Progressing  Goal: Maintain or return to baseline ADL function  Description: INTERVENTIONS:  -  Assess patient's ability to carry out ADLs; assess patient's baseline for ADL function and identify physical deficits which impact ability to perform ADLs (bathing, care of mouth/teeth, toileting, grooming, dressing, etc )  - Assess/evaluate cause of self-care deficits   - Assess range of motion  - Assess patient's mobility; develop plan if impaired  - Assess patient's need for assistive devices and provide as appropriate  - Encourage maximum independence but intervene and supervise when necessary  - Involve family in performance of ADLs  - Assess for home care needs following discharge   - Consider OT consult to assist with ADL evaluation and planning for discharge  - Provide patient education as appropriate  Outcome: Progressing  Goal: Maintain or return mobility status to optimal level  Description: INTERVENTIONS:  - Assess patient's baseline mobility status (ambulation, transfers, stairs, etc )    - Identify cognitive and physical deficits and behaviors that affect mobility  - Identify mobility aids required to assist with transfers and/or ambulation (gait belt, sit-to-stand, lift, walker, cane, etc )  - [place] fall precautions as indicated by assessment  - Record patient progress and toleration of activity level on Mobility SBAR; progress patient to next Phase/Stage  - Instruct patient to call for assistance with activity based on assessment  - Consider rehabilitation consult to assist with strengthening/weightbearing, etc   Outcome: Progressing     Problem: DISCHARGE PLANNING  Goal: Discharge to home or other facility with appropriate resources  Description: INTERVENTIONS:  - Identify barriers to discharge w/patient and caregiver  - Arrange for needed discharge resources and transportation as appropriate  - Identify discharge learning needs (meds, wound care, etc )  - Arrange for interpretive services to assist at discharge as needed  - Refer to Case Management Department for coordinating discharge planning if the patient needs post-hospital services based on physician/advanced practitioner order or complex needs related to functional status, cognitive ability, or social support system  Outcome: Progressing     Problem: Knowledge Deficit  Goal: Patient/family/caregiver demonstrates understanding of disease process, treatment plan, medications, and discharge instructions  Description: Complete learning assessment and assess knowledge base  Interventions:  - Provide teaching at level of understanding  - Provide teaching via preferred learning methods  Outcome: Progressing     Problem: Potential for Falls  Goal: Patient will remain free of falls  Description: INTERVENTIONS:  - Assess patient frequently for physical needs  -  Identify cognitive and physical deficits and behaviors that affect risk of falls    -  [place] fall precautions as indicated by assessment   - Educate patient/family on patient safety including physical limitations  - Instruct patient to call for assistance with activity based on assessment  - Modify environment to reduce risk of injury  - Consider OT/PT consult to assist with strengthening/mobility  Outcome: Progressing     Problem: Prexisting or High Potential for Compromised Skin Integrity  Goal: Skin integrity is maintained or improved  Description: INTERVENTIONS:  - Identify patients at risk for skin breakdown  - Assess and monitor skin integrity  - Assess and monitor nutrition and hydration status  - Monitor labs   - Assess for incontinence   - Turn and reposition patient  - Assist with mobility/ambulation  - Relieve pressure over bony prominences  - Avoid friction and shearing  - Provide appropriate hygiene as needed including keeping skin clean and dry  - Evaluate need for skin moisturizer/barrier cream  - Collaborate with interdisciplinary team   - Patient/family teaching  - Consider wound care consult   Outcome: Progressing     Problem: GASTROINTESTINAL - ADULT  Goal: Minimal or absence of nausea and/or vomiting  Description: INTERVENTIONS:  - Administer IV fluids if ordered to ensure adequate hydration  - Maintain NPO status until nausea and vomiting are resolved  - Nasogastric tube if ordered  - Administer ordered antiemetic medications as needed  - Provide nonpharmacologic comfort measures as appropriate  - Advance diet as tolerated, if ordered  - Consider nutrition services referral to assist patient with adequate nutrition and appropriate food choices  Outcome: Progressing  Goal: Maintains or returns to baseline bowel function  Description: INTERVENTIONS:  - Assess bowel function  - Encourage oral fluids to ensure adequate hydration  - Administer IV fluids if ordered to ensure adequate hydration  - Administer ordered medications as needed  - Encourage mobilization and activity  - Consider nutritional services referral to assist patient with adequate nutrition and appropriate food choices  Outcome: Progressing  Goal: Maintains adequate nutritional intake  Description: INTERVENTIONS:  - Monitor percentage of each meal consumed  - Identify factors contributing to decreased intake, treat as appropriate  - Assist with meals as needed  - Monitor I&O, weight, and lab values if indicated  - Obtain nutrition services referral as needed  Outcome: Progressing     Problem: GENITOURINARY - ADULT  Goal: Maintains or returns to baseline urinary function  Description: INTERVENTIONS:  - Assess urinary function  - Encourage oral fluids to ensure adequate hydration if ordered  - Administer IV fluids as ordered to ensure adequate hydration  - Administer ordered medications as needed  - Offer frequent toileting  - Follow urinary retention protocol if ordered  Outcome: Progressing     Problem: METABOLIC, FLUID AND ELECTROLYTES - ADULT  Goal: Electrolytes maintained within normal limits  Description: INTERVENTIONS:  - Monitor labs and assess patient for signs and symptoms of electrolyte imbalances  - Administer electrolyte replacement as ordered  - Monitor response to electrolyte replacements, including repeat lab results as appropriate  - Instruct patient on fluid and nutrition as appropriate  Outcome: Progressing  Goal: Fluid balance maintained  Description: INTERVENTIONS:  - Monitor labs   - Monitor I/O and WT  - Instruct patient on fluid and nutrition as appropriate  - Assess for signs & symptoms of volume excess or deficit  Outcome: Progressing     Problem: SKIN/TISSUE INTEGRITY - ADULT  Goal: Skin integrity remains intact  Description: INTERVENTIONS  - Identify patients at risk for skin breakdown  - Assess and monitor skin integrity  - Assess and monitor nutrition and hydration status  - Monitor labs (i e  albumin)  - Assess for incontinence   - Turn and reposition patient  - Assist with mobility/ambulation  - Relieve pressure over bony prominences  - Avoid friction and shearing  - Provide appropriate hygiene as needed including keeping skin clean and dry  - Evaluate need for skin moisturizer/barrier cream  - Collaborate with interdisciplinary team (i e  Nutrition, Rehabilitation, etc )   - Patient/family teaching  Outcome: Progressing     Problem: HEMATOLOGIC - ADULT  Goal: Maintains hematologic stability  Description: INTERVENTIONS  - Assess for signs and symptoms of bleeding or hemorrhage  - Monitor labs  - Administer supportive blood products/factors as ordered and appropriate  Outcome: Progressing     Problem: MUSCULOSKELETAL - ADULT  Goal: Maintain or return mobility to safest level of function  Description: INTERVENTIONS:  - Assess patient's ability to carry out ADLs; assess patient's baseline for ADL function and identify physical deficits which impact ability to perform ADLs (bathing, care of mouth/teeth, toileting, grooming, dressing, etc )  - Assess/evaluate cause of self-care deficits   - Assess range of motion  - Assess patient's mobility  - Assess patient's need for assistive devices and provide as appropriate  - Encourage maximum independence but intervene and supervise when necessary  - Involve family in performance of ADLs  - Assess for home care needs following discharge   - Consider OT consult to assist with ADL evaluation and planning for discharge  - Provide patient education as appropriate  Outcome: Progressing  Goal: Maintain proper alignment of affected body part  Description: INTERVENTIONS:  - Support, maintain and protect limb and body alignment  - Provide patient/ family with appropriate education  Outcome: Progressing     Problem: Prexisting or High Potential for Compromised Skin Integrity  Goal: Skin integrity is maintained or improved  Description: INTERVENTIONS:  - Identify patients at risk for skin breakdown  - Assess and monitor skin integrity  - Assess and monitor nutrition and hydration status  - Monitor labs   - Assess for incontinence   - Turn and reposition patient  - Assist with mobility/ambulation  - Relieve pressure over bony prominences  - Avoid friction and shearing  - Provide appropriate hygiene as needed including keeping skin clean and dry  - Evaluate need for skin moisturizer/barrier cream  - Collaborate with interdisciplinary team   - Patient/family teaching  - Consider wound care consult   Outcome: Progressing

## 2021-04-08 NOTE — PROGRESS NOTES
[facility]  Progress Note Gilda Roberts 1965, 63 y o  female MRN: [id number]  Unit/Bed#: [facility] 347-78 Encounter: [id number]  Primary Care Provider: Janice Hook MD   Date and time admitted to hospital: 4/7/2021  4:05 PM    * Tibial plateau fracture  Assessment & Plan  4/3 - R tibial plateau fracture and L nondisplaced wrist fracture s/p mechanical fall   + Head strike, LOC  CT head - no acute abnormalities  Pain management  L wrist NWB, Sugar tong splint  RLE NWB, TROM brace locked in extension  Platform walker on L - allowed to bear weight on L elbow  Follow-up with Ortho (Dr Anabelle Bhagat) in 1 week with repeat x-rays  (4/12)    PT/OT Therapies  Primary team following  Angular cheilitis  Assessment & Plan  Noted over the past 2-3 days, not itchy or painful  Dries overnight and cracks when pt speaks or eats  Pt has had in past and resolves with vaseline  Thrombocytopenia (HCC)  Assessment & Plan  Plt count 158 (4/8); 121 on 4/6;  165 on 4/3  Asymptomatic  Currently on lovenox injections  No acute s/s of bleeding  Will continue to trend  Left wrist distal radius nondisplaced fracture  Assessment & Plan  4/3 - R tibial plateau fracture and L nondisplaced wrist fracture s/p mechanical fall   + Head strike, LOC  CT head - no acute abnormalities  L wrist NWB, Sugar tong splint  Platform walker on L - allowed to bear weight on L elbow  Pain management  Follow-up with Ortho (Dr Anabelle Bhagat) in 1 week with repeat x-rays  (4/12)    PT/OT Therapies  Primary team following  GERD (gastroesophageal reflux disease)  Assessment & Plan  Takes Pepcid 20mg daily at home  Non-formulary  Continue with Protonix 20mg daily  Hypothyroidism  Assessment & Plan  Currently taking levothyroxine 175mcg  Dose decreased by Endocrinology in March  Last TSH was 0 14 and T4 was 1 57 on 1/28  Continue to follow-up with Endocrinology as outpatient        VTE Pharmacologic Prophylaxis:   Pharmacologic: Enoxaparin (Lovenox)  Mechanical VTE Prophylaxis in Place: Yes    Current Length of Stay: 1 day(s)    Current Patient Status: Inpatient Rehab     Discharge Plan: As per treatment team     Code Status: Level 1 - Full Code    Subjective:   Pt seen and examined this am, sitting in W/C after physical therapy  Pt reports her pain is 4-5/10 at rest  Pt states she had a bm yesterday  She had initially refused colace or bowel meds bc she was worried about diarrhea but then accepted this as she hadn't had a bm for 3-4 days  Pt notes she has some periumbilical pain for the past 2-3 days, relates this to a hernia she had that was repaired approx 7 years ago  Pt did not have any imaging of the abdomen during admission  hgb stable  Denies nausea, vomiting  Objective:     Vitals:   Temp (24hrs), Av 4 °F (36 9 °C), Min:97 5 °F (36 4 °C), Max:99 3 °F (37 4 °C)    Temp:  [97 5 °F (36 4 °C)-99 3 °F (37 4 °C)] 97 5 °F (36 4 °C)  HR:  [75-87] 75  Resp:  [18] 18  BP: (107-125)/(58-66) 119/58  SpO2:  [95 %-100 %] 98 %  Body mass index is 37 33 kg/m²  Review of Systems   Constitutional: Negative for appetite change, chills, fatigue and fever  HENT: Negative for congestion and sore throat  Respiratory: Negative for cough and shortness of breath  Cardiovascular: Negative for chest pain  Gastrointestinal: Positive for abdominal pain (2 -3 days periumbilical )  Negative for constipation, diarrhea and nausea  Musculoskeletal: Negative for arthralgias and gait problem  Skin: Negative for rash  Neurological: Negative for dizziness, light-headedness and headaches  Psychiatric/Behavioral: Negative for sleep disturbance  The patient is not nervous/anxious  Input and Output Summary (last 24 hours):        Intake/Output Summary (Last 24 hours) at 2021 1113  Last data filed at 2021 0853  Gross per 24 hour   Intake 120 ml   Output --   Net 120 ml       Physical Exam: Physical Exam  Vitals signs reviewed  Constitutional:       General: She is not in acute distress  HENT:      Head: Normocephalic  Mouth/Throat:      Mouth: Mucous membranes are moist       Comments: R side - cracking   Cardiovascular:      Rate and Rhythm: Normal rate and regular rhythm  Pulmonary:      Effort: Pulmonary effort is normal       Breath sounds: Normal breath sounds  No wheezing, rhonchi or rales  Abdominal:      General: Bowel sounds are normal  There is no distension  Palpations: Abdomen is soft  There is no mass  Tenderness: There is abdominal tenderness (slightly tender R periumbilical )  Hernia: No hernia is present  Musculoskeletal:         General: Deformity (RLE in brace, abrasions on R knee) present  Skin:     Findings: No rash  Neurological:      General: No focal deficit present  Mental Status: She is alert     Psychiatric:         Mood and Affect: Mood normal          Behavior: Behavior normal          Additional Data:     Labs:    Results from last 7 days   Lab Units 04/08/21  0549   WBC Thousand/uL 4 40*   HEMOGLOBIN g/dL 12 1   HEMATOCRIT % 36 5   PLATELETS Thousands/uL 158   NEUTROS PCT % 52   LYMPHS PCT % 34   MONOS PCT % 12*   EOS PCT % 2     Results from last 7 days   Lab Units 04/08/21  0549   SODIUM mmol/L 138   POTASSIUM mmol/L 4 1   CHLORIDE mmol/L 101   CO2 mmol/L 31*   BUN mg/dL 15   CREATININE mg/dL 0 62   ANION GAP mmol/L 6   CALCIUM mg/dL 9 6   GLUCOSE RANDOM mg/dL 98                     Labs reviewed    Imaging:    Imaging reviewed    Last 24 Hours Medication List:   Current Facility-Administered Medications   Medication Dose Route Frequency Provider Last Rate    acetaminophen  650 mg Oral Q6H PRN Aaron Bob MD      aluminum-magnesium hydroxide-simethicone  30 mL Oral Q6H PRN Aaron Bob MD      bisacodyl  10 mg Rectal Daily PRN Aaron Bob MD      calcium carbonate  1,000 mg Oral Daily PRN MD Ada Marcelo cyclobenzaprine  5 mg Oral TID PRN Pat Keenan MD      docusate sodium  100 mg Oral BID Pat Keenan MD      enoxaparin  40 mg Subcutaneous Q24H [address] Pat Keenan MD      levothyroxine  175 mcg Oral Early Morning Pat Keenan MD      ondansetron  4 mg Oral Q6H PRN Pat Keenan MD      oxyCODONE  2 5 mg Oral Q4H PRN Pat Keenan MD      oxyCODONE  5 mg Oral Q4H PRN Pat Keenan MD      pantoprazole  20 mg Oral Early Morning Pat Keenan MD      polyethylene glycol  17 g Oral Daily PRN Pat Keenan MD      simethicone  80 mg Oral 4x Daily PRN Pat Keenan MD          M*Modal software was used to dictate this note  It may contain errors with dictating incorrect words or incorrect spelling  Please contact the provider directly with any questions

## 2021-04-08 NOTE — PROGRESS NOTES
ARC ICP  PT LTGs ELOS x2 weeks  04/08/21 1430   Rehab Team Goals   Transfer Team Goal Patient will be independent with transfers with least restrictive device upon completion of rehab program   Locomotion Team Goal Patient will be independent with locomotion with least restrictive device upon completion of rehab program  (WC primary  )   Rehab Team Interventions   PT Interventions Gait Training; Therapeutic Exercise;Neuromuscualr Reeducation;Transfer Training;Community Reintegration;Bed Mobility; Wheelchair Mobility;Modalities; Patient/Family Education   PT Transfer Goal   Roll left and right Goal 04  Supervision or touching assistance- [place] provides VERBAL CUES or supervision throughout activity  Sit to lying Goal 06  Independent - Patient completes the activity by him/herself with no assistance from a helper  Lying to sitting on side of bed Goal 06  Independent - Patient completes the activity by him/herself with no assistance from a helper  Sit to stand Goal 06  Independent - Patient completes the activity by him/herself with no assistance from a helper  Chair/bed-to-chair transfer Goal 06  Independent - Patient completes the activity by him/herself with no assistance from a helper  Car Transfer Goal 03  Partial/moderate assistance - [place] does less than half the effort  [place] lifts or holds trunk or limbs and provides more than half the effort  Assistive Device Wheelchair;Roller Walker  (PF L RW)   Environment Level Surface; Well Lit; Distractions   Status Ongoing; Target goal - two weeks   Locomotion Goal   Primary discharge mode of locomotion Wheelchair   Target Walk Distance 15 ft   Assist Device Platform;Roller Walker   Walk 10 feet Goal 04  Supervision or touching assistance- [place] provides VERBAL CUES or supervision throughout activity  Walk 50 feet with 2 turns Goal 88  Not attempted due to medical condition or safety concerns   Walk 150 feet Goal 88   Not attempted due to medical condition or safety concerns   Walking 10 feet on uneven surface 88  Not attempted due to medical condition or safety concerns   Walking Goal Status Ongoing; Target goal - two weeks   Type of Wheelchair Used 1  Manual   Target Wheel Distance- Level 300 ft   Target Wheel Distance- Grade 10 ft   Wheel 50 feet with 2 turns Goal 06  Independent - Patient completes the activity by him/herself with no assistance from a helper  Wheel 150 feet Goal 06  Independent - Patient completes the activity by him/herself with no assistance from a helper  Decrease Assist With Remove Leg Rest;Replace Leg Rest   Environment Level Surface; Uneven Surface;Tile Floor;Distractions   Wheelchair Goal Status Ongoing; Target goal - two weeks   Stairs Goal   1 step or curb goal 03  Partial/moderate assistance - [place] does less than half the effort  [place] lifts or holds trunk or limbs and provides more than half the effort  4 steps Goal 88  Not attempted due to medical condition or safety concerns   12 steps Goal 88  Not attempted due to medical condition or safety concerns   Assist Level Minimum Assist   Number of Stairs 1   Technique Curb Step   Status Ongoing; Target goal - two weeks   Object Retrieval Goal   Picking up object Goal 88  Not attempted due to medical condition or safety concerns   Small Object Picked Up TBD pending progress and balance in stance

## 2021-04-08 NOTE — ASSESSMENT & PLAN NOTE
4/3 - R tibial plateau fracture and L nondisplaced wrist fracture s/p mechanical fall   + Head strike, LOC  CT head - no acute abnormalities  Pain management  L wrist NWB, Sugar tong splint  RLE NWB, TROM brace locked in extension  Platform walker on L - allowed to bear weight on L elbow  Follow-up with Ortho (Dr Sergey Dickey) in 1 week with repeat x-rays  (4/12)    PT/OT Therapies  Primary team following

## 2021-04-08 NOTE — PROGRESS NOTES
[address] [facility] OT GOALS     04/08/21 0830   Rehab Team Goals   ADL Team Goal Patient will require assist with ADLs with least restrictive device upon completion of rehab program   Eating Goal   Eating Goal 06  Independent - Patient completes the activity by him/herself with no assistance from a helper  Diet Level Regular   Status Target goal - two weeks   Interventions Compensation Strategies; Dysphagia Education; Optimal Position   Grooming Goal   Oral Hygiene Goal 06  Independent - Patient completes the activity by him/herself with no assistance from a helper  Task Wash/Dry Face;Wash/Dry Hands;Brush Teeth;Comb Hair;Make Up;Acquire Items; Initiate Task;Complete Groom   Environment Seated at [facility] level)   Safety Precautions NWB  (NWB LUE and RLE)   Status Target goal - two weeks   Intervention Therapeutic Exercise; Tolerance Work;Balance Work;Assistive Device   Tub/Shower Transfer Goal   Method   (sponge bathe at DC)   Bathing Goal   Shower/bathe self Goal 03  Partial/moderate assistance - [place] does less than half the effort  [place] lifts or holds trunk or limbs and provides more than half the effort  Environment Seated;Sponge Bath   Adaptive Equipment Reacher;Long Handle Sponge;Seat with back   Safety Precautions NWB  (NWB LUE and RLE)   Status Target goal - two weeks   Intervention ADL Training;Assistive Device; Therapeutic Exercise   Upper Body Dressing Goal   Upper body dressing Goal 06  Independent - Patient completes the activity by him/herself with no assistance from a helper  Task Upper Body;Arms in/out; Over Head;Bra   Adaptive Equipment Reacher   Environment Seated   Safety Precautions NWB  (NWB LUE and RLE)   Status Target goal - two weeks   Intervention Assistive Device;Balance Work; Therapeutic Exercise; Tolerance Work   Lower Body Dressing Goal   Lower body dressing Goal 03  Partial/moderate assistance - [place] does less than half the effort   [place] lifts or holds trunk or limbs and provides more than half the effort  Putting on/taking off footwear Goal 03  Partial/moderate assistance - [place] does less than half the effort  [place] lifts or holds trunk or limbs and provides more than half the effort  Task Lower Body;Shoe/Slipper;Pants;Socks; Undergarment;Orthotic  (TROM brace)   Adaptive Equipment Reacher;Sock Aide; Shoe [place];Dressing Stick; Elastic Laces   Environment Seated;Standing   Safety Precautions NWB  (NWB LUE and RLE)   Status Target goal - two weeks   Intervention Assistive Device;Balance Work; Therapeutic Exercise; Tolerance Work   Toileting Transfer Goal   Toilet transfer Goal 06  Independent - Patient completes the activity by him/herself with no assistance from a helper  Assistive Device Bedside Commode  (platform RW)   Safety NWB  (NWB LUE and RLE)   Status Target goal - two weeks   Intervention ADL Training;Balance Work;Assistive Device   Toileting Goal   Toileting hygiene Goal 06  Independent - Patient completes the activity by him/herself with no assistance from a helper  Task Pants Up;Pants Down;Hygiene   Assistive Device Reacher;Moist Wipes; Toilet Aide   Safety Balance;Use a Bedside Commode at [employer] a Bedside Commode during day   Status Target goal - two weeks   Intervention ADL Training;Balance Work;Assistive Device   Comprehension Goal   Comprehension Assist Level Independant   Function Demand Complex Needs   Status Target goal - two weeks   Interventions Receptive Language Tasks; Reading Tasks;Use of Gesture   Expression Goal   Expression Assist Level Independant   Function Demand Complex Needs   Status Target goal - two weeks   Intervention Expressive Language Tasks;Writing Tasks   Community Reintegration Goal   Goal [place] into community at [facility] status at completion of IPOT   Status Target - two weeks   Interventions Activity Tolerance;Leisure Activity   Medication Management   Assist Level Independent   Status Target goal - two weeks     July Oconnell OTR/L

## 2021-04-08 NOTE — ASSESSMENT & PLAN NOTE
· Sustained after traumatic fall on 4/3/21  · Treated non-operatively by Orthopedics  · Deemed NWB Left wrist - but allowed platform walker at left elbow  · Placed in a sugar-tong splint  · Follow-up x-ray comleted 4/12/21 - reviewed by Ortho - splint was re-wrapped 4/13/21 with improvement in discomfort  Patient has follow-up appointment on Tuesday 4/21/21  · Follow-up with Orthopedics as outpatient

## 2021-04-08 NOTE — PROGRESS NOTES
PT EVALUATION  [facility] TAA     04/08/21 1430   Patient Data   Rehab Impairment Impairment of mobility, safety and Activities of Daily Living (ADLs) due to Orthopedic Disorders:  08 9  Other Orthopedic Right Tibial Plateau Fracture, L Distal Radius Fracture    Etiologic Diagnosis R Tibial Plateau Fracture, L distal radial fracture   Date of Onset 04/03/21   Support System   Name [address]   Relationship daughter  (works as an OT)   Able to provide 24 hour supervision No   Home Setup   Type of Home Single Level  (both pt and daughter have 1 SH  )   Method of Entry Stairs   Number of Stairs 3  (1+1+1, daughter has no MICHELLE  )   Number of Stairs in [address]  Available Yes   Home Modifications Necessary?   (pending progress, daughter plans to put in grab bars in BR  )   Available Equipment   (none)   Baseline Information   Vocation Work Full Time  (x2 jobs)   Transportation    Prior Device(s) Used   (none)   Prior Level of Function   Self-Care 3  Independent - Patient completed the activities by him/herself, with or without an assistive device, with no assistance from a helper  Indoor-Mobility (Ambulation) 3  Independent - Patient completed the activities by him/herself, with or without an assistive device, with no assistance from a helper  Stairs 3  Independent - Patient completed the activities by him/herself, with or without an assistive device, with no assistance from a helper  Functional Cognition 3  Independent - Patient completed the activities by him/herself, with or without an assistive device, with no assistance from a helper  Prior Device Used Z  None of the above   Falls in the Last Year   Number of falls in the past 12 months 1  (this injury)   Patient Preference   Nickname (Patient Preference) Cris   Psychosocial   Psychosocial (WDL) WDL   Patient Behaviors/Mood Calm; Cooperative   Restrictions/Precautions   Precautions Fall Risk;Pain   LUE Weight Bearing Per Order NWB   RLE Weight Bearing Per Order NWB   Braces or Orthoses LE Immobilizer; Other (Comment)  (RLE locked in extension, LUE sugar tong splint)   Pain Assessment   Pain Assessment Tool 0-10   Pain Score 3   Pain Location/Orientation Orientation: Right;Location: Leg   Pain Onset/Description Onset: Ongoing   Hospital Pain Intervention(s) Cold applied;Repositioned   Transfer Bed/Chair/Wheelchair   Limitations Noted In Balance;Pain Management; Endurance;UE Strength;LE Strength   Adaptive Equipment Platform;Roller Walker  (L PFRW)   Type of Assistance Needed Physical assistance   Amount of Physical Assistance Provided Total assistance   Comment maxAx2 for balance, maintaining precautions   Chair/Bed-to-Chair Transfer CARE Score 1   Roll Left and Right   Comment discussed rolling  challenged to roll R or L given opposing NWB limbs   Reason if not Attempted Safety concerns   Roll Left and Right CARE Score 88   Sit to Lying   Type of Assistance Needed Physical assistance   Amount of Physical Assistance Provided Total assistance   Comment maxAx2 for balance, maintaining precautions   Sit to Lying CARE Score 1   Lying to Sitting on Side of Bed   Type of Assistance Needed Physical assistance   Amount of Physical Assistance Provided 50%-74%   Comment mod A from mat table, Needs A  for R LE support and trunk lifting      Lying to Sitting on Side of Bed CARE Score 2   Sit to Stand   Type of Assistance Needed Physical assistance   Amount of Physical Assistance Provided Total assistance   Comment maxAx2 w/ platform walker   Sit to Stand CARE Score 1   Picking Up Object   Comment questionable appropriate pending progress   Reason if not Attempted Safety concerns   Picking Up Object CARE Score 88   Car Transfer   Reason if not Attempted Environmental limitations   Car Transfer CARE Score 10   Ambulation   Primary Mode of Locomotion Prior to Admission Walk   Distance Walked (feet) 5 ft   Assist Device Platform;Roller Walker  (L PF)   Gait Pattern Hopping   Limitations Noted In Speed;Strength;Swing   Provided Assistance with: Balance   Walk Assist Level Minimum Assist   Findings short distance trial hopping fwd with L PFRW, maintains NWB R LE  Walk 10 Feet   Reason if not Attempted Safety concerns  (likely will dc at wc level)   Walk 10 Feet CARE Score 88   Walk 50 Feet with Two Turns   Reason if not Attempted Safety concerns   Walk 50 Feet with Two Turns CARE Score 88   Walk 150 Feet   Reason if not Attempted Safety concerns   Walk 150 Feet CARE Score 88   Walking 10 Feet on Uneven Surfaces   Reason if not Attempted Safety concerns   Walking 10 Feet on Uneven Surfaces CARE Score 88   Wheelchair mobility   Type of Wheelchair Used 1  Manual   Method Right upper extremity; Left lower extremity   Assistance Provided For Remove Leg Rest;Remove armrests;Replace Leg Rest;Replace armrests;Obstacles; Locking Brakes   Distance Level Surface (feet) 150 ft   Distance Wheeled 3% Grade   (ftx2)   Findings use of L brake extension for ease of brake mgmt  Wheel 50 Feet with Two Turns   Type of Assistance Needed Supervision   Amount of Physical Assistance Provided No physical assistance   Wheel 50 Feet with Two Turns CARE Score 4   Wheel 150 Feet   Type of Assistance Needed Supervision   Amount of Physical Assistance Provided No physical assistance   Wheel 150 Feet CARE Score 4   Curb or Single Stair   Reason if not Attempted Safety concerns   1 Step (Curb) CARE Score 88   4 Steps   Reason if not Attempted Safety concerns   4 Steps CARE Score 88   12 Steps   Reason if not Attempted Safety concerns   12 Steps CARE Score 88   RLE Assessment   RLE Assessment X  (not tested, due to pain, R ankle WFL  )   LLE Assessment   LLE Assessment WFL  (4 grossly throughout)   Sensation   Light Touch No apparent deficits   Cognition   Overall Cognitive Status WFL   Arousal/Participation Alert; Responsive   Attention Within functional limits   Orientation Level Oriented X4   Memory Within functional limits   Following Commands Follows multistep commands with increased time or repetition   Objective Measure   PT Measure(s) Orthostatic vitals: supine BP- 118/57, sit BP- 106/57, standing BP- 92/59   Therapeutic Exercise   Therapeutic Exercise/Activity Multiple STS w/ platform walker, maxAx2 required for balance and maintaining precautions  Time spent for [employer] [facility] mgmt, fitting , and positioning  Discharge Information   Vocational Plan Return to work   Barriers to Return to [employer]; Endurance;Transportation Issues   Patient's Discharge Plan to daughters home, No MICHELLE, will be alone at times  Patient's Rehab Expectations "To get back to work"   Barriers to Discharge Home Limited Family Support;Decreased Strength;Decreased Endurance;Pain; Safety Considerations   Impressions Pt is a 63year old female who fell getting out of her car and was admitted to [place] [address] on 04/03/2021 found to have R knee nondisplaced tibial plateau fracture, mild frontal forehead hematoma, and per xray note taken on 04/03 likely acute nondisplaced distal radial metaphysis fracture  Recommend conservative treatment only at the time  Pt was prescribed a sugar tong splint for L wrist and may only weight bear through elbow  Per chart cleared to use platform walker  Pt is NWB for R LE and prescribed TROM brace locked in extension  PMH includes history of multiple GI surgeries including gastric sleeve  PLOF, pt was independent with all ADLs, IADs, driving, and worked 2 full time jobs  Pt lives in a single level home with 3 MICHELLE (1+1+1), lives with adult son who works full time  Pt will likely be dc to daughter's house, with first floor set up and 0STE  Daughter is a full time OT  PT adjusted LE brace for a more appropriate fit  MMT for RLE was grossly 4 throughout  Pt required maxAx2 for STS and sit <>supine due to decreased strength and balance   Pt completed WC mobility for [id number] at the beginning and end of session with S  Pt presented with increased pain in RLE throughout session, provided with ice at end of session  Pt ended session in recliner with BLE elevated and LUE on pillow for support  Plan to focus on conditioning, general strengthening, balance, stairs as needed, and transfers in order to facilitate independence at [place]  Pt is a good candidate for skilled PT services for an ELOS of 2 weeks at  level to reach [place] goals, due to lack of 24/7 S at [place]  Will need , daughter asked about a bed rail  Will continue to assess for DME needs     PT Therapy Minutes   PT Time In 1330   PT Time Out 1430   PT Total Time (minutes) 60   PT Mode of treatment - Individual (minutes) 60   PT Mode of treatment - Concurrent (minutes) 0   PT Mode of treatment - Group (minutes) 0   PT Mode of treatment - Co-treat (minutes) 0   PT Mode of Treatment - Total time(minutes) 60 minutes   PT Cumulative Minutes 60   Cumulative Minutes   Cumulative therapy minutes 180

## 2021-04-08 NOTE — CASE MANAGEMENT
Patient presents to [facility] after a fall resulting in R tibial plateau and L radius fractures  Met with the patient to discuss the rehab process including therapy program, team meetings, communication, estimated LOS and role of CM  She is very motivated to participate in the rehab program so she can get home safely  She lives alone but will be staying at her daughter, fina Fernando due to accessibility  Her daughter works during the day as an Occupational Therapist so the patient will be alone during those times  The patient stated she does not have any DME, is not setup with community resources and has no experience with home or outpatient therapies  She uses [employer] and did state that she would like to have her initial scripts filled with [facility]  CM will follow patient for care coordation and discharge planning/ facilitation  Patient was given authorization ([id number]) from 4/7/21-4/13/21 with update due 4/13   Update to be made by calling [phone]

## 2021-04-08 NOTE — TEAM CONFERENCE
Acute RehabilitationTeam Conference Note  Date: 4/8/2021   Time: 1:32 PM       Patient Name:  Zev Driscoll       Medical Record Number: [id number]   YOB: 1965  Sex: Female          Room/Bed:  [facility] 264/[facility] 264-01  Payor Info:  Payor: Davide Mercado / Plan: Sarina Oconnell / Product Type: Blue Fee for Service /      Admitting Diagnosis: Tibial plateau fracture [[id number]]   Admit Date/Time:  4/7/2021  4:05 PM  Admission Comments: No comment available     Primary Diagnosis:  Tibial plateau fracture  Principal Problem: Tibial plateau fracture    Patient Active Problem List    Diagnosis Date Noted    Angular cheilitis 04/08/2021    History of sleeve gastrectomy 04/08/2021    Anemia 04/08/2021    Vitamin D deficiency 04/07/2021    Thrombocytopenia ([address] ) 04/07/2021    Dizziness 04/05/2021    Ambulatory dysfunction 04/03/2021    Tibial plateau fracture 83/51/5333    Hypothyroidism 04/03/2021    GERD (gastroesophageal reflux disease) 04/03/2021    Left wrist distal radius nondisplaced fracture 04/03/2021    Obesity 04/03/2021       Physical Therapy:         4 8 21 Pt admitted yesterday to have PT eval this PM          Occupational Therapy:          OT evaluation to be completed 4/8/21  Speech Therapy:           No notes on file    Nursing Notes:  Appetite: Good  Diet Type: Regular/House, Thin Liquids                           Type of Wound (LDA): Wound                       Bladder: 1 - Total Assistance        Bowel: 1 - Total Assistance        Pain Location/Orientation: Orientation: Right, Location: Leg  Pain Score: 3                       Hospital Pain Intervention(s): Repositioned, Ambulation/increased activity     Medication Management/Safety  Injectable: Lovenox     63 y o  female with a PMH of hypothyroidism, GERD, and obesity who originally presented to [facility]  on 4/3/2021 after a mechanical fall while getting out of the car    The patient hit her head on the curb and had a brief moment of loss of consciousness along with persistent L wrist and R knee pain  X-rays showed acute nondisplaced, nondepressed R tibial plateau fracture and L acute, nondisplaced distal radial metaphysis fracture  CT head showed no acute intracranial abnormality but did show mild left anterior frontal/forehead subgaleal hematoma  Ortho was consulted and had patient placed in sugar tong splint of the L wrist and TROM brace of the R knee that is locked in extension  Patient is to be NWB to L wrist and RLE  Plans to use platform walker on the L with weight bearing through the L elbow and follow-up with Dr China Grimes in 1 week as outpatient with repeat x-rays  Admitted to [address] [place] [address]  4/7/21  GERD managed with protonix  Hypothyroidism managed with levothyroxine  Ppx: Lovenox  This week we will monitor pt's lab results & vital signs  Pt will have safe transfers with use of call bell & hourly rounding to prevent falls  Pt will have adequate pain relief to fully participate in therapies  Pt will understand importance of T/R & off loading weight to prevent pressure injury  We will encourage independence with ADL's         Case Management:     Discharge Planning  Living Arrangements: Children  Support Systems: Children  Assistance Needed: tbd  Type of Current Residence: Private residence  Current [facility] [address] : No  No notes on file    4/8/2021  Patient presents to [facility] after a fall resulting in R tibial plateau and L radius fractures  Met with the patient to discuss the rehab process including therapy program, team meetings, communication, estimated LOS and role of CM  She is very motivated to participate in the rehab program so she can get home safely  She lives alone but will be staying at her daughter, fina Sharma due to accessibility  Her daughter works during the day as an Occupational Therapist so the patient will be alone during those times   The patient stated she does not have any DME, is not setup with [employer] resources and has no experience with home or outpatient therapies  She uses [employer] and did state that she would like to have her initial scripts filled with [facility]  CM will follow patient for care coordation and discharge planning/ facilitation  Is the patient actively participating in therapies? yes  List any modifications to the treatment plan: No    Barriers Interventions   NWB on L wrist and RLE W/C training, transfer training   Abdominal pain Manage bowel meds, consider imaging given her GI history   BP fluctuating Orthostatic BPs, med adjustments as needed,   LE strength/ balance due to NWB Balance training, LE strengthening, functional transfer training          Is the patient making expected progress toward goals? yes  List any update or changes to goals: No    Medical Goals: Patient will be medically stable for discharge to [facility] restrictive envrionment upon completion of rehab program and Patient will be able to manage medical conditions and comorbid conditions with medications and follow up upon completion of rehab program    Weekly Team Goals:   Rehab Team Goals  ADL Team Goal: Patient will require assist with ADLs with least restrictive device upon completion of rehab program  Bowel/Bladder Team Goal: Patient will return to premorbid level for bladder/bowel management upon completion of rehab program    Discussion: Initial evaluations have been started by all disciplines but will be complete later today  She presents with the above barriers notes  The team identifies that she is motivated to participate but does require a significant amount of physical assistance for mobility and self care at this time  Over the next week the team will be focusing on progressing her function and independence with wheelchair mobility and self care tasks  Anticipated Discharge Date:  [facility] Team Members Present:   The following team members are supervising care for this patient and were present during this Weekly Team Conference      Physician: Dr Ellyn Weston MD  : Brandan Brody DPT  Registered Nurse: Camron Gomes RN  Physical Therapist: Colette Gaona PT  Occupational Therapist: Reagan Pozo MS, OTR/L

## 2021-04-08 NOTE — ASSESSMENT & PLAN NOTE
· Resolved  · Was related to mild orthostasis recovered with IV hydration  · In ARC, if needed will use TEDS and abdominal binder

## 2021-04-08 NOTE — ASSESSMENT & PLAN NOTE
Plt count 158 (4/8); 121 on 4/6;  165 on 4/3  Asymptomatic  Currently on lovenox injections  No acute s/s of bleeding  Will continue to trend

## 2021-04-08 NOTE — ASSESSMENT & PLAN NOTE
· S/p laparascopic sleeve gastrectomy and,cholecystectomy - Dr Kirstin Barclay @ [facility] on 12/9/15

## 2021-04-08 NOTE — PROGRESS NOTES
[address] [facility] OT EVAL     04/08/21 1179   Patient Data   Rehab Impairment Impairment of mobility, safety and Activities of Daily Living (ADLs) due to Orthopedic Disorders:  08 9  Other Orthopedic Right Tibial Plateau Fracture, L Distal Radius Fracture    Etiologic Diagnosis Tibial Plateau Fracture, Wrist Fracture    Date of Onset 04/03/21   Support System   Relationship daughter  (works FT as OT, pt expecting to DC to dtr's home)   Able to provide 24 hour supervision No   Able to provide physical help? Yes   Multiple Support Systems Yes   Support System 2   Relationship 2 Son  (pt lives w/ son)   Able to provide 24 hour supervision 2 No   Able to provide physical help? (2) Yes   Support System 3   Relationship 3 sister   Able to provide 24 hour supervision 3 No   Able to provide physical help? (3) No  (intermittent sup/assist, able to prepare meals)   Home Setup   Type of Home Single Level  (this set up is for pt's own home, pt expecting to DC to dtrs)   Method of Entry Stairs   Number of Stairs 3  (1+1+1)   Number of Stairs in Home 0   First Floor Bathroom Full   First Floor Bathroom Accessibility   (doorway not big enough for platform RW (only 22" wide))   First Floor Setup Available Yes   Available Equipment   (none)   Prior IADL Participation   Meal Preparation Full Participation   Laundry Full Participation   Home Cleaning Full Participation   Prior Level of Function   Self-Care 3  Independent - Patient completed the activities by him/herself, with or without an assistive device, with no assistance from a helper  Indoor-Mobility (Ambulation) 3  Independent - Patient completed the activities by him/herself, with or without an assistive device, with no assistance from a helper  Stairs 3  Independent - Patient completed the activities by him/herself, with or without an assistive device, with no assistance from a helper  Functional Cognition 3   Independent - Patient completed the activities by him/herself, with or without an assistive device, with no assistance from a helper  Prior Device Used Z  None of the above   Falls in the Last Year   Number of falls in the past 12 months 1  (leading to current injury)   Patient Preference   Nicknamdarnell (Patient Preference) Cris   Psychosocial   Psychosocial (WDL) WDL   Patient Behaviors/Mood Calm; Cooperative   Restrictions/Precautions   Precautions Aspiration; Fall Risk;Pain   Weight Bearing Restrictions Yes   LUE Weight Bearing Per Order NWB   RLE Weight Bearing Per Order NWB   Braces or Orthoses LE Immobilizer; Other (Comment)  (RLE TROM locked in exten, RUE sugar tong splint)   Pain Assessment   Pain Assessment Tool 0-10   Pain Score 4   Pain Location/Orientation Orientation: Right;Location: Leg   Hospital Pain Intervention(s) Repositioned; Ambulation/increased activity   Eating Assessment   Type of Assistance Needed Set-up / clean-up   Amount of Physical Assistance Provided No physical assistance   Comment assist to open containers d/t LUE NWB   Eating CARE Score 5   Oral Hygiene   Type of Assistance Needed Set-up / clean-up   Amount of Physical Assistance Provided No physical assistance   Comment set up seated at sink   Oral Hygiene CARE Score 5   Grooming   Able To Initiate Tasks; Acquire Items; Apply Make-up;Comb/Brush Hair;Wash/Dry Face;Brush/Clean Teeth;Wash/Dry Hands   Limitation Noted In Timeliness   Findings seated d/t NWB RLE and LUE, extra time required to complete d/t compenstating strategies   Tub/Shower Transfer   Reason Not Assessed Medical;Sponge Bath  ("pt may not shower" order)   Shower/Bathe Self   Type of Assistance Needed Physical assistance   Amount of Physical Assistance Provided 75% or more   Comment MaxA to complete sponge bath seated EOB   Shower/Bathe Self CARE Score 2   Bathing   Assessed Bath Style Sponge Bath   Anticipated D/C Bath Style Sponge Bath   Able to Gather/Transport No   Able to [employer] Temperature No   Limitations Noted in ROM;Endurance;Balance;Timeliness   Positioning Seated;Standing   Dressing/Undressing Clothing   Type of Assistance Needed Physical assistance   Amount of Physical Assistance Provided 50%-74%   Comment ModA to don bra and pullover shirt while seated   Upper Body Dressing CARE Score 2   Amount of Physical Assistance Provided Total assistance   Comment totalA x1 to thread to knee height and for CM in stance w/ platform RW   Lower Body Dressing CARE Score 1   Putting On/Taking Off Footwear   Type of Assistance Needed Physical assistance   Amount of Physical Assistance Provided Total assistance   Comment total assist to don/doff hospital sock on RLE, sock and sneaker on LLE  Putting On/Taking Off Footwear CARE Score 1   Toileting Hygiene   Type of Assistance Needed Physical assistance   Amount of Physical Assistance Provided Total assistance   Comment Assist x2 for task completion in stance w/ steadying assist provided by a    Toileting Hygiene CARE Score 1   Toilet Transfer   Surface Assessed Standard Commode   Transfer Technique Stand Pivot   Limitations Noted In Balance; Endurance;ROM;Safety;UE Strength;LE Strength   Adaptive Equipment Grab Bar   Type of Assistance Needed Physical assistance   Amount of Physical Assistance Provided Total assistance   Comment Assist x2 using grab bar, platform RW and BSC over toilet  Toilet Transfer CARE Score 1   Toileting   Able to Pull Clothing down no, up no  Able to Manage Clothing Closures No   Manage Hygiene Bladder   Limitations Noted In Balance;Problem Solving; Safety;LE Strength;UE Strength   Lying to Sitting on Side of Bed   Amount of Physical Assistance Provided 50%-74%   Comment ModA HOB flat w/o bedrail   Lying to Sitting on Side of Bed CARE Score 2   Sit to Stand   Type of Assistance Needed Physical assistance   Amount of Physical Assistance Provided Total assistance   Comment Ax2 w/o RW, progressed to Max x1 w/ platform RW   Sit to Stand CARE Score 1 Comprehension   QI: Comprehension 3  Usually Understands: Understands most conversations, but misses some part/intent of message  Requires cues at times to understand  Expression   QI: Expression 3  Exhibits some difficulty with expressing needs and ideas (e g , some words or finishing thoughts) or speech is not clear   RUE Assessment   RUE Assessment WFL   Strength - RUE   R Gross Arm Strength 4/5   LUE Assessment   LUE Assessment X   AROM - LUE   L Shoulder Flexion WFL   L Shoulder Extension WF:   L Shoulder ABduction WFL   L Shoulder ADduction WFL   L Shoulder Internal Rotation WFL   L Shoulder External Rotation WFL   L Elbow Flexion U/A to assess d/t sugar tong brace   L Elbow Extension U/A to assess d/t sugar tong brace   L Wrist Flexion U/A to assess NWB   L Wrist Extension U/A to assess NWB   L Wrist ABduction U/A to assess NWB   L Wrist ADduction U/A to assess NWB   Coordination   Movements are Fluid and Coordinated 0   Coordination and Movement Description antalgic   Sensation   Light Touch No apparent deficits   Cognition   Overall Cognitive Status WFL   Arousal/Participation Alert; Responsive   Attention Within functional limits   Orientation Level Oriented X4   Memory Within functional limits   Following Commands Follows multistep commands with increased time or repetition   Vision   Vision Comments intact w/ glasses   Objective Measure   OT Measure(s) Orthostatic Vitals: supine: /59 HR 80, Sit: /55 HR 86, Standing: /59    OT Findings  R: 37lbs, 24lbs, 22 lbs  Pt is R handed  Therapeutic Exercise   Therapeutic Exercise/Activity Trialed STS w/ platform RW, MaxA to complete, pt able to stand and maintain NWB precautions for 3min before becoming fatigued and requiring seated rest break   Discharge Information   Vocational Plan Full Time  (works 2 FT jobs, 1st shift and 3rd shift)   Barriers to Return to [facility]; Endurance Patient's Discharge Plan Plan to DC to dtr's house where pt will have 1st floor set up with bedroom and half bath  Pt reports this space is WC accessible  She will not have 24/7 sup/assist but intermittent sup/A from dtr, adult son, and sister  Patient's Rehab Expectations "be as IND as possible"   Barriers to Discharge Home Limited Family Support; Safety Considerations;Pain;Decreased Endurance;Decreased Strength  (bathroom not RW accessible in Pt's home)   Impressions Cris Hyed is a 63 y o  female with PMH of hypothyroidism, GERD, who recently fell while getting out of her care in which she apparently fell on her face and hands, hitting her head with brief LOC followed by pain in L wrist and R leg who presented to hospital unable to ambulate   CT head showed mild frontal/forehead hematoma without acute intracranial findings  Imaging also revealed R comminuted nondisplaced tibial plateau fracture and left distal radius fracture   Orthopedics consulted and recommended NWB of R leg and L wrist   She is to use sugar tong splint for wrist fracture and TROM brace locked in extension for R leg   Patient may WB thru L elbow and use platform walker for ambulation   Patient recommended ortho follow-up with repeat x-rays in about 1 week (around 4/12)   Course also notable for some dizziness and orthostatic hypotension and she received IVF  PT presents to [facility] on 4/8 for IPOT eval  Pt currently requires extensive assist up to 2 skilled staff members to safely complete basic ADL and fxnl transfers d/t balance, strength, ROM, and endurance deficits and NWB of LUE and RLE  This is a significant decline in function as PTA pt IND w/ all ADLs/IADLs, driving and 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 ) Pt's home is single level and she lives w/ her adult son, however; pt reports that platform RW will not fit into her bathroom (doorway is 22") and she plans to DC to daughter's house   Pt will not have 24/7 sup/assist  Pt is good candidate for IPOT to reach established OT goals of Mod I-Aura (brace management) at [facility] level in 2 weeks time     OT Therapy Minutes   OT Time In 0830   OT Time Out 1030   OT Total Time (minutes) 120   OT Mode of treatment - Individual (minutes) 120   OT Mode of treatment - Concurrent (minutes) 0   OT Mode of treatment - Group (minutes) 0   OT Mode of treatment - Co-treat (minutes) 0   OT Mode of Treatment - Total time(minutes) 120 minutes   OT Cumulative Minutes 120   Cumulative Minutes   Cumulative therapy minutes [address], OTR/L

## 2021-04-08 NOTE — ASSESSMENT & PLAN NOTE
4/3 - R tibial plateau fracture and L nondisplaced wrist fracture s/p mechanical fall   + Head strike, LOC  CT head - no acute abnormalities  L wrist NWB, Sugar tong splint  Platform walker on L - allowed to bear weight on L elbow  Pain management  Follow-up with Ortho (Dr Sharon Najera) in 1 week with repeat x-rays  (4/12)    PT/OT Therapies  Primary team following

## 2021-04-08 NOTE — QUICK NOTE
I personally attended, reviewed, and discussed medical and functional updates in team conference today  Please refer to advance care planning note for details  Expected Discharge: TBD, [facility] - estimate 2 weeks        Trever Chiang MD  PM&R

## 2021-04-08 NOTE — ASSESSMENT & PLAN NOTE
Noted over the past 2-3 days, not itchy or painful  Dries overnight and cracks when pt speaks or eats  Pt has had in past and resolves with vaseline

## 2021-04-09 ENCOUNTER — TELEPHONE (OUTPATIENT)
Dept: OBGYN CLINIC | Facility: HOSPITAL | Age: 56
End: 2021-04-09

## 2021-04-09 PROCEDURE — 97530 THERAPEUTIC ACTIVITIES: CPT

## 2021-04-09 PROCEDURE — 97110 THERAPEUTIC EXERCISES: CPT

## 2021-04-09 PROCEDURE — 99233 SBSQ HOSP IP/OBS HIGH 50: CPT | Performed by: PHYSICAL MEDICINE & REHABILITATION

## 2021-04-09 PROCEDURE — 99232 SBSQ HOSP IP/OBS MODERATE 35: CPT | Performed by: NURSE PRACTITIONER

## 2021-04-09 PROCEDURE — 97535 SELF CARE MNGMENT TRAINING: CPT

## 2021-04-09 RX ORDER — ERGOCALCIFEROL 1.25 MG/1
50000 CAPSULE ORAL WEEKLY
Status: DISCONTINUED | OUTPATIENT
Start: 2021-04-09 | End: 2021-04-17 | Stop reason: HOSPADM

## 2021-04-09 RX ADMIN — ACETAMINOPHEN 650 MG: 325 TABLET, FILM COATED ORAL at 08:22

## 2021-04-09 RX ADMIN — PANTOPRAZOLE SODIUM 20 MG: 20 TABLET, DELAYED RELEASE ORAL at 06:23

## 2021-04-09 RX ADMIN — BACITRACIN 1 SMALL APPLICATION: 500 OINTMENT TOPICAL at 21:12

## 2021-04-09 RX ADMIN — ERGOCALCIFEROL 50000 UNITS: 1.25 CAPSULE ORAL at 08:27

## 2021-04-09 RX ADMIN — DOCUSATE SODIUM 100 MG: 100 CAPSULE ORAL at 17:45

## 2021-04-09 RX ADMIN — DOCUSATE SODIUM 100 MG: 100 CAPSULE ORAL at 08:22

## 2021-04-09 RX ADMIN — ENOXAPARIN SODIUM 40 MG: 40 INJECTION SUBCUTANEOUS at 08:22

## 2021-04-09 RX ADMIN — LEVOTHYROXINE SODIUM 175 MCG: 0.15 TABLET ORAL at 06:22

## 2021-04-09 NOTE — PLAN OF CARE
Problem: PAIN - ADULT  Goal: Verbalizes/displays adequate comfort level or baseline comfort level  Description: Interventions:  - Encourage patient to monitor pain and request assistance  - Assess pain using appropriate pain scale  - Administer analgesics based on type and severity of pain and evaluate response  - Implement non-pharmacological measures as appropriate and evaluate response  - Consider cultural and social influences on pain and pain management  - Notify physician/advanced practitioner if interventions unsuccessful or patient reports new pain  Outcome: Progressing     Problem: INFECTION - ADULT  Goal: Absence or prevention of progression during hospitalization  Description: INTERVENTIONS:  - Assess and monitor for signs and symptoms of infection  - Monitor lab/diagnostic results  - Monitor all insertion sites, i e  indwelling lines, tubes, and drains  - Monitor endotracheal if appropriate and nasal secretions for changes in amount and color  - [place] appropriate cooling/warming therapies per order  - Administer medications as ordered  - Instruct and encourage patient and family to use good hand hygiene technique  - Identify and instruct in appropriate isolation precautions for identified infection/condition  Outcome: Progressing  Goal: Absence of fever/infection during neutropenic period  Description: INTERVENTIONS:  - Monitor WBC    Outcome: Progressing     Problem: SAFETY ADULT  Goal: Patient will remain free of falls  Description: INTERVENTIONS:  - Assess patient frequently for physical needs  -  Identify cognitive and physical deficits and behaviors that affect risk of falls    -  [place] fall precautions as indicated by assessment   - Educate patient/family on patient safety including physical limitations  - Instruct patient to call for assistance with activity based on assessment  - Modify environment to reduce risk of injury  - Consider OT/PT consult to assist with strengthening/mobility  Outcome: Progressing  Goal: Maintain or return to baseline ADL function  Description: INTERVENTIONS:  -  Assess patient's ability to carry out ADLs; assess patient's baseline for ADL function and identify physical deficits which impact ability to perform ADLs (bathing, care of mouth/teeth, toileting, grooming, dressing, etc )  - Assess/evaluate cause of self-care deficits   - Assess range of motion  - Assess patient's mobility; develop plan if impaired  - Assess patient's need for assistive devices and provide as appropriate  - Encourage maximum independence but intervene and supervise when necessary  - Involve family in performance of ADLs  - Assess for home care needs following discharge   - Consider OT consult to assist with ADL evaluation and planning for discharge  - Provide patient education as appropriate  Outcome: Progressing  Goal: Maintain or return mobility status to optimal level  Description: INTERVENTIONS:  - Assess patient's baseline mobility status (ambulation, transfers, stairs, etc )    - Identify cognitive and physical deficits and behaviors that affect mobility  - Identify mobility aids required to assist with transfers and/or ambulation (gait belt, sit-to-stand, lift, walker, cane, etc )  - [place] fall precautions as indicated by assessment  - Record patient progress and toleration of activity level on Mobility SBAR; progress patient to next Phase/Stage  - Instruct patient to call for assistance with activity based on assessment  - Consider rehabilitation consult to assist with strengthening/weightbearing, etc   Outcome: Progressing     Problem: DISCHARGE PLANNING  Goal: Discharge to home or other facility with appropriate resources  Description: INTERVENTIONS:  - Identify barriers to discharge w/patient and caregiver  - Arrange for needed discharge resources and transportation as appropriate  - Identify discharge learning needs (meds, wound care, etc )  - Arrange for interpretive services to assist at discharge as needed  - Refer to Case Management Department for coordinating discharge planning if the patient needs post-hospital services based on physician/advanced practitioner order or complex needs related to functional status, cognitive ability, or social support system  Outcome: Progressing     Problem: Knowledge Deficit  Goal: Patient/family/caregiver demonstrates understanding of disease process, treatment plan, medications, and discharge instructions  Description: Complete learning assessment and assess knowledge base  Interventions:  - Provide teaching at level of understanding  - Provide teaching via preferred learning methods  Outcome: Progressing     Problem: Potential for Falls  Goal: Patient will remain free of falls  Description: INTERVENTIONS:  - Assess patient frequently for physical needs  -  Identify cognitive and physical deficits and behaviors that affect risk of falls    -  [place] fall precautions as indicated by assessment   - Educate patient/family on patient safety including physical limitations  - Instruct patient to call for assistance with activity based on assessment  - Modify environment to reduce risk of injury  - Consider OT/PT consult to assist with strengthening/mobility  Outcome: Progressing     Problem: Prexisting or High Potential for Compromised Skin Integrity  Goal: Skin integrity is maintained or improved  Description: INTERVENTIONS:  - Identify patients at risk for skin breakdown  - Assess and monitor skin integrity  - Assess and monitor nutrition and hydration status  - Monitor labs   - Assess for incontinence   - Turn and reposition patient  - Assist with mobility/ambulation  - Relieve pressure over bony prominences  - Avoid friction and shearing  - Provide appropriate hygiene as needed including keeping skin clean and dry  - Evaluate need for skin moisturizer/barrier cream  - Collaborate with interdisciplinary team   - Patient/family teaching  - Consider wound care consult   Outcome: Progressing     Problem: GASTROINTESTINAL - ADULT  Goal: Minimal or absence of nausea and/or vomiting  Description: INTERVENTIONS:  - Administer IV fluids if ordered to ensure adequate hydration  - Maintain NPO status until nausea and vomiting are resolved  - Nasogastric tube if ordered  - Administer ordered antiemetic medications as needed  - Provide nonpharmacologic comfort measures as appropriate  - Advance diet as tolerated, if ordered  - Consider nutrition services referral to assist patient with adequate nutrition and appropriate food choices  Outcome: Progressing  Goal: Maintains or returns to baseline bowel function  Description: INTERVENTIONS:  - Assess bowel function  - Encourage oral fluids to ensure adequate hydration  - Administer IV fluids if ordered to ensure adequate hydration  - Administer ordered medications as needed  - Encourage mobilization and activity  - Consider nutritional services referral to assist patient with adequate nutrition and appropriate food choices  Outcome: Progressing  Goal: Maintains adequate nutritional intake  Description: INTERVENTIONS:  - Monitor percentage of each meal consumed  - Identify factors contributing to decreased intake, treat as appropriate  - Assist with meals as needed  - Monitor I&O, weight, and lab values if indicated  - Obtain nutrition services referral as needed  Outcome: Progressing     Problem: GENITOURINARY - ADULT  Goal: Maintains or returns to baseline urinary function  Description: INTERVENTIONS:  - Assess urinary function  - Encourage oral fluids to ensure adequate hydration if ordered  - Administer IV fluids as ordered to ensure adequate hydration  - Administer ordered medications as needed  - Offer frequent toileting  - Follow urinary retention protocol if ordered  Outcome: Progressing     Problem: METABOLIC, FLUID AND ELECTROLYTES - ADULT  Goal: Electrolytes maintained within normal limits  Description: INTERVENTIONS:  - Monitor labs and assess patient for signs and symptoms of electrolyte imbalances  - Administer electrolyte replacement as ordered  - Monitor response to electrolyte replacements, including repeat lab results as appropriate  - Instruct patient on fluid and nutrition as appropriate  Outcome: Progressing  Goal: Fluid balance maintained  Description: INTERVENTIONS:  - Monitor labs   - Monitor I/O and WT  - Instruct patient on fluid and nutrition as appropriate  - Assess for signs & symptoms of volume excess or deficit  Outcome: Progressing     Problem: SKIN/TISSUE INTEGRITY - ADULT  Goal: Skin integrity remains intact  Description: INTERVENTIONS  - Identify patients at risk for skin breakdown  - Assess and monitor skin integrity  - Assess and monitor nutrition and hydration status  - Monitor labs (i e  albumin)  - Assess for incontinence   - Turn and reposition patient  - Assist with mobility/ambulation  - Relieve pressure over bony prominences  - Avoid friction and shearing  - Provide appropriate hygiene as needed including keeping skin clean and dry  - Evaluate need for skin moisturizer/barrier cream  - Collaborate with interdisciplinary team (i e  Nutrition, Rehabilitation, etc )   - Patient/family teaching  Outcome: Progressing     Problem: HEMATOLOGIC - ADULT  Goal: Maintains hematologic stability  Description: INTERVENTIONS  - Assess for signs and symptoms of bleeding or hemorrhage  - Monitor labs  - Administer supportive blood products/factors as ordered and appropriate  Outcome: Progressing     Problem: MUSCULOSKELETAL - ADULT  Goal: Maintain or return mobility to safest level of function  Description: INTERVENTIONS:  - Assess patient's ability to carry out ADLs; assess patient's baseline for ADL function and identify physical deficits which impact ability to perform ADLs (bathing, care of mouth/teeth, toileting, grooming, dressing, etc )  - Assess/evaluate cause of self-care deficits   - Assess range of motion  - Assess patient's mobility  - Assess patient's need for assistive devices and provide as appropriate  - Encourage maximum independence but intervene and supervise when necessary  - Involve family in performance of ADLs  - Assess for home care needs following discharge   - Consider OT consult to assist with ADL evaluation and planning for discharge  - Provide patient education as appropriate  Outcome: Progressing  Goal: Maintain proper alignment of affected body part  Description: INTERVENTIONS:  - Support, maintain and protect limb and body alignment  - Provide patient/ family with appropriate education  Outcome: Progressing     Problem: Prexisting or High Potential for Compromised Skin Integrity  Goal: Skin integrity is maintained or improved  Description: INTERVENTIONS:  - Identify patients at risk for skin breakdown  - Assess and monitor skin integrity  - Assess and monitor nutrition and hydration status  - Monitor labs   - Assess for incontinence   - Turn and reposition patient  - Assist with mobility/ambulation  - Relieve pressure over bony prominences  - Avoid friction and shearing  - Provide appropriate hygiene as needed including keeping skin clean and dry  - Evaluate need for skin moisturizer/barrier cream  - Collaborate with interdisciplinary team   - Patient/family teaching  - Consider wound care consult   Outcome: Progressing

## 2021-04-09 NOTE — PROGRESS NOTES
OT Treatment Note       04/09/21 1230   Pain Assessment   Pain Assessment Tool 0-10   Pain Score 4   Pain Location/Orientation Orientation: Bilateral;Location: Knee;Orientation: Right   Hospital Pain Intervention(s) Repositioned; Rest   Restrictions/Precautions   Precautions Fall Risk   Weight Bearing Restrictions Yes   LUE Weight Bearing Per Order NWB  (can WB through L elbow on PF RW)   RLE Weight Bearing Per Order NWB   Braces or Orthoses LE Immobilizer   Lifestyle   Autonomy "I'm doing ok"   Grooming   Findings seated in WC pt washing R hand with setup   Putting On/Taking Off Footwear   Type of Assistance Needed Set-up / clean-up   Amount of Physical Assistance Provided No physical assistance   Comment to don L sneaker only   Putting On/Taking Off Footwear CARE Score 5   Lying to Sitting on Side of Bed   Type of Assistance Needed Physical assistance   Amount of Physical Assistance Provided Less than 25%   Comment [place] to support RLE upon descent to floor   Lying to Sitting on Side of Bed CARE Score 3   Sit to Stand   Type of Assistance Needed Incidental touching   Amount of Physical Assistance Provided No physical assistance   Comment CGA to PF RW   Sit to Stand CARE Score 4   Bed-Chair Transfer   Type of Assistance Needed Incidental touching   Amount of Physical Assistance Provided No physical assistance   Comment CGA SPT using PF RW   Chair/Bed-to-Chair Transfer CARE Score 4   Toileting Hygiene   Type of Assistance Needed Physical assistance   Amount of Physical Assistance Provided 25%-49%   Comment modA required for clothing management to fully pull down/up clothing   Toileting Hygiene CARE Score 3   Toilet Transfer   Type of Assistance Needed Incidental touching   Amount of Physical Assistance Provided No physical assistance   Comment CGA SPT WC<->commode using PF RW   Toilet Transfer CARE Score 4   Exercise Tools   Other Exercise Tool 1 For increased (I) with functional transfers, pt completing RUE therex using 4# db: chest press, shoulder flexion (0-90degrees), lat pullbacks, and bicep curls 12x2 reps each   Cognition   Overall Cognitive Status WFL   Arousal/Participation Alert; Cooperative   Attention Within functional limits   Orientation Level Oriented X4   Memory Within functional limits   Following Commands Follows multistep commands without difficulty   Activity Tolerance   Activity Tolerance Patient tolerated treatment well   Assessment   Treatment Assessment Pt seen for 30min OT session focusing on functional transfers, toileting/grooming tasks, and RUE therex  Pt completing transfers with CGA using PFRW this session  See above for further details  OT to continue POC to address standing balance/tolerance, BADLs using LB AD, RUE strengthening, bed mobility, and functional transfers to maximize safety and (I) prior to discharge home  Prognosis Good   Problem List Decreased strength;Decreased range of motion;Decreased endurance; Impaired balance;Decreased mobility;Orthopedic restrictions;Pain   Barriers to Discharge Inaccessible home environment;Decreased caregiver support   Plan   Treatment/Interventions ADL retraining;Functional transfer training; Therapeutic exercise; Endurance training;Patient/family training;Equipment eval/education; Bed mobility; Compensatory technique education   Progress Progressing toward goals   Recommendation   OT Discharge Recommendation Return to previous environment with social support   OT Therapy Minutes   OT Time In 1230   OT Time Out 1300   OT Total Time (minutes) 30   OT Mode of treatment - Individual (minutes) 30   OT Mode of treatment - Concurrent (minutes) 0   OT Mode of treatment - Group (minutes) 0   OT Mode of treatment - Co-treat (minutes) 0   OT Mode of Treatment - Total time(minutes) 30 minutes   OT Cumulative Minutes 210   Therapy Time missed   Time missed?  No

## 2021-04-09 NOTE — PROGRESS NOTES
04/09/21 0830   Pain Assessment   Pain Assessment Tool 0-10   Pain Score 7   Pain Location/Orientation Orientation: Right;Location: Knee  (worse in dependent position)   Pain Onset/Description Descriptor: Aching;Frequency: Intermittent   Hospital Pain Intervention(s) Repositioned;Elevated   Restrictions/Precautions   Precautions Fall Risk   LUE Weight Bearing Per Order NWB  (ok to use PF RW)   RLE Weight Bearing Per Order NWB   Braces or Orthoses LE Immobilizer   General   Change In Medical/Functional Status MiN A 1 person transfers   Subjective   Subjective slight dizziness with supine to it , but only initial transfer, does not feel comfortable in w/c   Roll Left and Right   Reason if not Attempted Safety concerns   Roll Left and Right CARE Score 88   Sit to Lying   Type of Assistance Needed Physical assistance   Amount of Physical Assistance Provided 25%-49%   Sit to Lying CARE Score 3   Lying to Sitting on Side of Bed   Type of Assistance Needed Physical assistance   Amount of Physical Assistance Provided 25%-49%   Lying to Sitting on Side of Bed CARE Score 3   Sit to Stand   Type of Assistance Needed Physical assistance; Adaptive equipment   Amount of Physical Assistance Provided Less than 25%   Sit to Stand CARE Score 3   Bed-Chair Transfer   Type of Assistance Needed Physical assistance; Adaptive equipment   Amount of Physical Assistance Provided 25%-49%   Comment SPT with PF RW, Sneaker Left foot, maintains NWB well on her own, Good control of walker   Chair/Bed-to-Chair Transfer CARE Score 3   Car Transfer   Reason if not Attempted Environmental limitations   Car Transfer CARE Score 10   Therapeutic Interventions   Strengthening AA SLR 3 x 5 reps, quad sets x 10 w/ 5 sec hold   Other Practice supine<> sit, STS, SPT and bumping on side of bed several times   Other Comments   Comments Tubi- on R LE under knee brace and adjusted again for proper fit   Pt commenting improved comfort and with brace snug provides "more support with less pain"   Assessment   Treatment Assessment Pt seen for 30 min session in her room to work on Bed mob and transfers  Initiated AA SLR to work towards pt eventually being able to get her R LE in/out of be on her own  Demonstrates ability to safely perform transfers with Min A of 1 person using PF RW  RN and OTR also present to observe transfers for carryover throughout day  Spoke with pt's DTR, Gaurav Perez, via phone to discuss transfer status  Also, will have staff observe patient performing transfer with dtr the next time visiting to sign off on allowing her to assist  Remained up in recliner chair with OTR for next therapy  Change in functional status reported to team at [facility]  PT Therapy Minutes   PT Time In 0830   PT Time Out 0900   PT Total Time (minutes) 30   PT Mode of treatment - Individual (minutes) 30   PT Mode of treatment - Concurrent (minutes) 0   PT Mode of treatment - Group (minutes) 0   PT Mode of treatment - Co-treat (minutes) 0   PT Mode of Treatment - Total time(minutes) 30 minutes   PT Cumulative Minutes 90   Therapy Time missed   Time missed?  No

## 2021-04-09 NOTE — ASSESSMENT & PLAN NOTE
Last Vitamin D level was 14 in 2019  Vitamin D level 12 4 on 4/8  Started on Vitamin D supplementation, 50,000u weekly  Continue for 6 weeks and follow-up with PCP

## 2021-04-09 NOTE — TELEPHONE ENCOUNTER
Patient is currently in patient at [facility]  She was to follow up with Dr Filomena Davidson 1 week from Discharge  What is the date of follow up for this patient incase she is in rehab for extended period?

## 2021-04-09 NOTE — PROGRESS NOTES
[facility]  Progress Note Gunnar Joshi 1965, 63 y o  female MRN: [id number]  Unit/Bed#: [facility] 766-53 Encounter: [id number]  Primary Care Provider: Jhon Preciado MD   Date and time admitted to hospital: 4/7/2021  4:05 PM    Angular cheilitis  Assessment & Plan  Noted over the past 2-3 days, not itchy or painful  Dries overnight and cracks when pt speaks or eats  Pt has had in past and resolves with vaseline  Thrombocytopenia (HCC)  Assessment & Plan  Plt count 158 (4/8); 121 on 4/6;  165 on 4/3  Asymptomatic  Currently on lovenox injections  No acute s/s of bleeding  Will continue to trend  Vitamin D deficiency  Assessment & Plan  Last Vitamin D level was 14 in 2019  Vitamin D level 12 4 on 4/8  Started on Vitamin D supplementation, 50,000u weekly  Continue for 6 weeks and follow-up with PCP  Dizziness  Assessment & Plan  Experienced orthostasis in the acute setting that resolved after IV fluids  Monitor BP with routine VS   Encourage fluid intake  Obtain orthostatic BPs  Obesity  Assessment & Plan  Nutritional counseling as needed  Gastric sleeve surgery 5 years ago  Recently started to gain weight, Endocrine ordered Contrave for patient, but she would prefer nutritional counseling  Has an appointment to follow-up with Weight Management  Left wrist distal radius nondisplaced fracture  Assessment & Plan  4/3 - R tibial plateau fracture and L nondisplaced wrist fracture s/p mechanical fall   + Head strike, LOC  CT head - no acute abnormalities  L wrist NWB, Sugar tong splint  Platform walker on L - allowed to bear weight on L elbow  Pain management  Follow-up with Ortho (Dr Nicole Montalvo) in 1 week with repeat x-rays  (4/12)    PT/OT Therapies  Primary team following  GERD (gastroesophageal reflux disease)  Assessment & Plan  Takes Pepcid 20mg daily at home  Non-formulary  Continue with Protonix 20mg daily      Hypothyroidism  Assessment & Plan  Currently taking levothyroxine 175mcg  Dose decreased by Endocrinology in March  Last TSH was 0 14 and T4 was 1 57 on   Continue to follow-up with Endocrinology as outpatient  * Tibial plateau fracture  Assessment & Plan  4/3 - R tibial plateau fracture and L nondisplaced wrist fracture s/p mechanical fall   + Head strike, LOC  CT head - no acute abnormalities  Pain management  L wrist NWB, Sugar tong splint  RLE NWB, TROM brace locked in extension  Platform walker on L - allowed to bear weight on L elbow  Follow-up with Ortho (Dr Nicole Montalvo) in 1 week with repeat x-rays  ()    PT/OT Therapies  Primary team following  VTE Pharmacologic Prophylaxis:   Pharmacologic: Enoxaparin (Lovenox)  Mechanical VTE Prophylaxis in Place: Yes - sequential compression devices  Current Length of Stay: 2 day(s)    Current Patient Status: Inpatient Rehab     Discharge Plan: As per primary team     Code Status: Level 1 - Full Code    Subjective:   Pt examined while pt sitting in bed in pt room  No complaints currently  Denies any abdominal pain today, feels that it resolved after having a BM  LUE and RLE pain is minimal, improves after receiving pain medication  States that the pain medication makes her very sleepy - advised to try half a dose  Therapy has been going really well  Has noticed improvement in the ulcer on the corner of her lip  LBM yesterday with no issues  Objective:     Vitals:   Temp (24hrs), Av 5 °F (36 9 °C), Min:98 1 °F (36 7 °C), Max:99 °F (37 2 °C)    Temp:  [98 1 °F (36 7 °C)-99 °F (37 2 °C)] 98 1 °F (36 7 °C)  HR:  [80-90] 90  Resp:  [16-18] 18  BP: ()/(52-62) 111/56  SpO2:  [98 %-99 %] 99 %  Body mass index is 37 33 kg/m²  Review of Systems   Constitutional: Negative for chills, fatigue and fever  Respiratory: Negative for cough and shortness of breath  Cardiovascular: Negative for chest pain, palpitations and leg swelling     Gastrointestinal: Negative for abdominal distention, abdominal pain, constipation, diarrhea, nausea and vomiting  LBM 4/8   Genitourinary: Negative for difficulty urinating  Musculoskeletal: Positive for arthralgias (Minimal LUE/RLE pain, denies any numbness/tingling, aching/throbbing, improves with pain medication and rest)  Neurological: Negative for dizziness and light-headedness  Psychiatric/Behavioral: Negative for sleep disturbance  Input and Output Summary (last 24 hours): Intake/Output Summary (Last 24 hours) at 4/9/2021 0907  Last data filed at 4/9/2021 0841  Gross per 24 hour   Intake 360 ml   Output --   Net 360 ml       Physical Exam:     Physical Exam  Vitals signs and nursing note reviewed  Constitutional:       Appearance: Normal appearance  She is obese  HENT:      Head: Normocephalic and atraumatic  Mouth/Throat:      Comments: Dry ulcer corner of R lower lip  Cardiovascular:      Rate and Rhythm: Normal rate and regular rhythm  Pulses: Normal pulses  Heart sounds: Normal heart sounds  No murmur  No friction rub  Pulmonary:      Effort: Pulmonary effort is normal  No respiratory distress  Breath sounds: Normal breath sounds  No wheezing or rhonchi  Abdominal:      General: Abdomen is flat  Bowel sounds are normal  There is no distension  Palpations: Abdomen is soft  Tenderness: There is no abdominal tenderness  Musculoskeletal:      Right lower leg: No edema  Left lower leg: No edema  Comments: LUE in splint  RLE in immobilizer with tubigrip underneath  Skin:     General: Skin is warm and dry  Neurological:      Mental Status: She is alert and oriented to person, place, and time     Psychiatric:         Mood and Affect: Mood normal          Behavior: Behavior normal          Additional Data:     Labs:    Results from last 7 days   Lab Units 04/08/21  0549   WBC Thousand/uL 4 40*   HEMOGLOBIN g/dL 12 1   HEMATOCRIT % 36 5   PLATELETS Thousands/uL 158   NEUTROS PCT % 52   LYMPHS PCT % 34   MONOS PCT % 12*   EOS PCT % 2     Results from last 7 days   Lab Units 04/08/21  0549   SODIUM mmol/L 138   POTASSIUM mmol/L 4 1   CHLORIDE mmol/L 101   CO2 mmol/L 31*   BUN mg/dL 15   CREATININE mg/dL 0 62   ANION GAP mmol/L 6   CALCIUM mg/dL 9 6   GLUCOSE RANDOM mg/dL 98                       Labs reviewed    Imaging:    Imaging reviewed    Recent Cultures (last 7 days):           Last 24 Hours Medication List:   Current Facility-Administered Medications   Medication Dose Route Frequency Provider Last Rate    acetaminophen  650 mg Oral Q6H PRN Ankur Banegas MD      aluminum-magnesium hydroxide-simethicone  30 mL Oral Q6H PRN Ankur Banegas MD      bacitracin  1 small application Topical HS Michaelle Herrera PA-C      bisacodyl  10 mg Rectal Daily PRN Ankur Banegas MD      calcium carbonate  1,000 mg Oral Daily PRN Ankur Banegas MD      cyclobenzaprine  5 mg Oral TID PRN Ankur Banegas MD      docusate sodium  100 mg Oral BID Ankur Banegas MD      enoxaparin  40 mg Subcutaneous Q24H [address] Ankur Banegas MD      ergocalciferol  50,000 Units Oral Weekly Earma ELEUTERIO Lees      levothyroxine  175 mcg Oral Early Morning Ankur Banegas MD      ondansetron  4 mg Oral Q6H PRN Ankur Banegas MD      oxyCODONE  2 5 mg Oral Q4H PRN Ankur Banegas MD      oxyCODONE  5 mg Oral Q4H PRN Ankur Banegas MD      pantoprazole  20 mg Oral Early Morning Ankur Banegas MD      polyethylene glycol  17 g Oral Daily PRN Ankur Banegas MD      simethicone  80 mg Oral 4x Daily PRN Ankur Banegas MD          M*Modal software was used to dictate this note  It may contain errors with dictating incorrect words or incorrect spelling  Please contact the provider directly with any questions

## 2021-04-09 NOTE — PROGRESS NOTES
PM&R PROGRESS NOTE:  Guy Sommer 63 y o  female MRN: [id number]  Unit/Bed#: -01 Encounter: [id number]        Rehab Diagnosis: Impairment of mobility, safety and Activities of Daily Living (ADLs) due to Orthopedic Disorders:  08 9 Other Orthopedic Right tibial plateu fracture, left radius fracture    HPI: Guy Sommer is a 63 y o  female with hypothyroidism, GERD, gastric sleeve, who presented to the [facility] [address] on 4/3/21 status post traumatic fall upon exiting her car  Sustained a right tibial plateau comminuted nondisplaced fracture, left least distal radius fracture  Evaluated by Orthopedics and treated nonsurgically  Deemed nonweightbearing right lower extremity as well as left wrist (allowed to use platform walker at left elbow)  Placed in a hinged knee brace locked in extension and a sugar-tong splint for the left upper extremity  Patient will require repeat imaging in 1 week  Medically, patient was found to have some orthostasis which was symptomatic and IV fluids  Patient with mild anemia and thrombocytopenia which was followed  Patient also treated for acute nociceptive pain  Patient found to have acute functional her baseline given her nonweightbearing status, and after clearance and stabilization was admitted to [address] [place] [address] for acute inpatient rehabilitation  SUBJECTIVE: Patient seen face to face  No acute issues  Denies increased pain at either the wrist or the knee  Taking Tylenol predominantly  Denies further abdominal pain  Need some papers for work filled out   + BM 4/8/21  ASSESSMENT: Stable, progressing      PLAN:    Rehabilitation   Functional deficits: right lower extremity weakness, left upper extremity weakness, decreased endurance, impaired mobility, self care    Continue current rehabilitation plan of care to maximize function   Functional update:  Progressing to a Min/mod assist with sit to stand transfer and bed to chair transfer     Estimated Discharge: TBD      DVT prophylaxis  · Managed on subcutaneous Lovenox     Pain  · Managed on p r n  Tylenol, oxycodone IR 2 5 mg-5 mg every 4 hours p r n  for moderate to severe pain  · P r n  Flexeril available for muscular spasms  · Continue topical ice and elevation of limb     Bladder plan  · Continent     Bowel plan  · Continent   · Last BM 4/8/21  * Tibial plateau fracture  Assessment & Plan  · Sustained after traumatic fall on 4/3/21  · Treated non-operatively by Orthopedics  · Deemed NWB RLE placed in a hinged knee brace locked in extension  · Follow-up imaging in 1 week (around 4/12/21) recommended per Orthopedics - Will order these on Monday 4/13/21  · Follow-up with Orthopedics as outpatient     Left wrist distal radius nondisplaced fracture  Assessment & Plan  · Sustained after traumatic fall on 4/3/21  · Treated non-operatively by Orthopedics  · Deemed NWB Left wrist - but allowed platform walker at left elbow  · Placed in a sugar-tong splint  · Follow-up imaging recommended in 1 week (around 4/12/21) - Will order these on Monday 4/13/21  · Follow-up with Orthopedics as outpatient  Anemia  Assessment & Plan  · Mild  · Asymptomatic  · Will follow    History of sleeve gastrectomy  Assessment & Plan  · S/p laparascopic sleeve gastrectomy and,cholecystectomy - Dr Apolonia Lieberman @ [facility] on 12/9/15      Thrombocytopenia ([address] )  Assessment & Plan  · Trending up  · Continue to follow    Dizziness  Assessment & Plan  · Resolved  · Was related to mild orthostasis recovered with IV hydration  · In ARC, if needed will use TEDS and abdominal binder    GERD (gastroesophageal reflux disease)  Assessment & Plan  · Managed on Protonix (therapeutic exchange for Omeprazole)  · PRN tums and mylanta    Hypothyroidism  Assessment & Plan  · Managed on Synthroid     Appreciate IM consultants medical co-management  Labs, medications, and imaging personally reviewed        ROS:  A ten point review of systems was completed on 4/9/21 and pertinent positives are listed in subjective section  All other systems reviewed were negative  OBJECTIVE:   /56   Pulse 90   Temp 98 1 °F (36 7 °C) (Tympanic)   Resp 18   Ht 5' 5" (1 651 m)   Wt 102 kg (224 lb 4 8 oz) Comment: with leg immobilizer  LMP 10/24/2016   SpO2 99%   BMI 37 33 kg/m²       Physical Exam  Vitals signs and nursing note reviewed  Constitutional:       General: She is not in acute distress  HENT:      Head: Normocephalic and atraumatic  Nose: Nose normal       Mouth/Throat:      Mouth: Mucous membranes are moist    Eyes:      Conjunctiva/sclera: Conjunctivae normal    Neck:      Musculoskeletal: Neck supple  Cardiovascular:      Rate and Rhythm: Normal rate and regular rhythm  Pulses: Normal pulses  Pulmonary:      Effort: Pulmonary effort is normal       Breath sounds: Normal breath sounds  No wheezing or rales  Abdominal:      General: Bowel sounds are normal  There is no distension  Palpations: Abdomen is soft  Tenderness: There is no abdominal tenderness  Musculoskeletal:         General: No swelling  Comments: LUE splint in place  RLE: Hinged knee brace in place locked in extension   Skin:     General: Skin is warm  Neurological:      Mental Status: She is alert and oriented to person, place, and time        Comments: Motor on left fingers stable 4/5   RLE: 1+/5 HF, 4/5 DF, EHL, PF   Psychiatric:         Mood and Affect: Mood normal           Lab Results   Component Value Date    WBC 4 40 (L) 04/08/2021    HGB 12 1 04/08/2021    HCT 36 5 04/08/2021    MCV 78 (L) 04/08/2021     04/08/2021     Lab Results   Component Value Date    SODIUM 138 04/08/2021    K 4 1 04/08/2021     04/08/2021    CO2 31 (H) 04/08/2021    BUN 15 04/08/2021    CREATININE 0 62 04/08/2021    GLUC 98 04/08/2021    CALCIUM 9 6 04/08/2021     No results found for: INR, PROTIME        Current Facility-Administered Medications:    acetaminophen (TYLENOL) tablet 650 mg, 650 mg, Oral, Q6H PRN, Nadeen Liriano MD, 650 mg at 04/09/21 0822    aluminum-magnesium hydroxide-simethicone (MYLANTA) oral suspension 30 mL, 30 mL, Oral, Q6H PRN, Nadeen Liriano MD    bacitracin topical ointment 1 small application, 1 small application, Topical, HS, Michaelle Herrera, SANDRA, 1 small application at 51/24/22 2101    bisacodyl (DULCOLAX) rectal suppository 10 mg, 10 mg, Rectal, Daily PRN, Nadeen Liriano MD    calcium carbonate (TUMS) chewable tablet 1,000 mg, 1,000 mg, Oral, Daily PRN, Nadeen Liriano MD    cyclobenzaprine (FLEXERIL) tablet 5 mg, 5 mg, Oral, TID PRN, Nadeen Liriano MD    docusate sodium (COLACE) capsule 100 mg, 100 mg, Oral, BID, Nadeen Liriano MD, 100 mg at 04/09/21 6012    enoxaparin (LOVENOX) subcutaneous injection 40 mg, 40 mg, Subcutaneous, Q24H [address], Nadeen Liriano MD, 40 mg at 04/09/21 4325    ergocalciferol (VITAMIN D2) capsule 50,000 Units, 50,000 Units, Oral, Weekly, Dorrene Copa, CRNP, 50,000 Units at 04/09/21 0827    levothyroxine tablet 175 mcg, 175 mcg, Oral, Early Morning, Nadeen Liriano MD, 175 mcg at 04/09/21 0622    ondansetron (ZOFRAN-ODT) dispersible tablet 4 mg, 4 mg, Oral, Q6H PRN, Nadeen Liriano MD    oxyCODONE (ROXICODONE) IR tablet 2 5 mg, 2 5 mg, Oral, Q4H PRN, Nadeen Liriano MD    oxyCODONE (ROXICODONE) IR tablet 5 mg, 5 mg, Oral, Q4H PRN, Nadeen Liriano MD, 5 mg at 04/08/21 2053    pantoprazole (PROTONIX) EC tablet 20 mg, 20 mg, Oral, Early Morning, Nadeen Liriano MD, 20 mg at 04/09/21 0593    polyethylene glycol (MIRALAX) packet 17 g, 17 g, Oral, Daily PRN, Nadeen Liriano MD    simethicone (MYLICON) chewable tablet 80 mg, 80 mg, Oral, 4x Daily PRN, Nadeen Liriano MD    Past Medical History:   Diagnosis Date    Disease of thyroid gland        Patient Active Problem List    Diagnosis Date Noted    Tibial plateau fracture 61/68/4375     Priority: High    Left wrist distal radius nondisplaced fracture 04/03/2021     Priority: Medium    Angular cheilitis 04/08/2021    History of sleeve gastrectomy 04/08/2021    Anemia 04/08/2021    Vitamin D deficiency 04/07/2021    Thrombocytopenia ([address] ) 04/07/2021    Dizziness 04/05/2021    Ambulatory dysfunction 04/03/2021    Hypothyroidism 04/03/2021    GERD (gastroesophageal reflux disease) 04/03/2021    Obesity 04/03/2021          Tracy Williamson MD    Total time spent:  30 minutes with more than 50% spent counseling/coordinating care  Counseling includes discussion with patient re: progress and discussion with patient of his/her current medical/functional state/information  Coordination of patient's care was performed in conjunction with consulting services  Time invested included review of patient's labs, vitals, and management of their comorbidities with continued monitoring  The care of the patient was extensively discussed and appropriate treatment plan was formulated unique for this patient  ** Please Note:  voice to text software may have been used in the creation of this document   Although proof errors in transcription or interpretation are a potential of such software**

## 2021-04-09 NOTE — PROGRESS NOTES
04/09/21 1430   Pain Assessment   Pain Assessment Tool 0-10   Pain Score 4   Pain Location/Orientation Orientation: Right;Location: Leg   Restrictions/Precautions   Precautions Fall Risk;Pain   LUE Weight Bearing Per Order NWB   RLE Weight Bearing Per Order NWB   Braces or Orthoses LE Immobilizer  (Locked into extension)   Sit to Lying   Type of Assistance Needed Physical assistance   Amount of Physical Assistance Provided 50%-74%   Comment for RLE lift    Sit to Lying CARE Score 2   Lying to Sitting on Side of Bed   Type of Assistance Needed Physical assistance   Amount of Physical Assistance Provided Less than 25%   Lying to Sitting on Side of Bed CARE Score 3   Sit to Stand   Type of Assistance Needed Incidental touching   Comment CG to PF RW   Sit to Stand CARE Score 4   Bed-Chair Transfer   Type of Assistance Needed Physical assistance   Amount of Physical Assistance Provided Less than 25%   Comment Min A/ CG with PRW   Chair/Bed-to-Chair Transfer CARE Score 3   Transfer Bed/Chair/Wheelchair   Findings Pt overall doing well with showing progress with xfers including sit-stand and stand pivots  Bed mobility challenging due to RLE weakness and will benefit from cont strengthening hip abd and hip flexors   Wheel 50 Feet with Two Turns   Type of Assistance Needed Supervision   Wheel 50 Feet with Two Turns CARE Score 4   Wheel 150 Feet   Type of Assistance Needed Supervision   Wheel 150 Feet CARE Score 4   Wheelchair mobility   Findings Pt still needs A and education on WC components and set up for xfers    Therapeutic Interventions   Strengthening Supine RLE SLR AAROM 5x3  Hip abd/add AAROM 5x3,  R ankle DR and PF  inv/ evr    Assessment   Treatment Assessment 60 min session mostly focused on repeated sit-stands and stand pivots  Pt is making quick progress but still needs A with WC components, set up,  positioning walker,  A with bed moility and A to manuever RLE at times    Cont skilled PT toward LTGs Barriers to Discharge Inaccessible home environment;Decreased caregiver support   PT Barriers   Physical Impairment Decreased strength;Decreased range of motion; Impaired balance;Decreased mobility;Orthopedic restrictions   Functional Limitation Stair negotiation;Standing;Transfers; Walking;Ramp negotiation   Plan   Progress Progressing toward goals   PT Therapy Minutes   PT Time In 1430   PT Time Out 1530   PT Total Time (minutes) 60   PT Mode of treatment - Individual (minutes) 60   PT Mode of treatment - Concurrent (minutes) 0   PT Mode of treatment - Group (minutes) 0   PT Mode of treatment - Co-treat (minutes) 0   PT Mode of Treatment - Total time(minutes) 60 minutes   PT Cumulative Minutes 150   Therapy Time missed   Time missed?  No

## 2021-04-09 NOTE — PROGRESS NOTES
OT Treatment note       04/09/21 0900   Pain Assessment   Pain Assessment Tool 0-10   Pain Score 5   Pain Location/Orientation Orientation: Right;Location: Leg   Hospital Pain Intervention(s) Repositioned; Rest   Restrictions/Precautions   Precautions Fall Risk   Weight Bearing Restrictions Yes   LUE Weight Bearing Per Order NWB  (can WB through elbow with PFRW)   RLE Weight Bearing Per Order NWB   Braces or Orthoses LE Immobilizer   Lifestyle   Autonomy "I'm ok "   Grooming   Findings seated in WC pt washing R hand with setup   Lower Body Dressing   Type of Assistance Needed Physical assistance   Amount of Physical Assistance Provided 25%-49%   Comment Edu pt on use of LBAD for increased (I) with LB dressing  Pt able to doff/don shorts with modA with use of reacher, with assistance required to assist in elevating LLE while pt threading LE through pant leg, and to fully hike pants over L hip  Lower Body Dressing CARE Score 3   Putting On/Taking Off Footwear   Type of Assistance Needed Physical assistance   Amount of Physical Assistance Provided 50%-74%   Comment Edu pt on use of LBAD for increased (I) with LB dressing tasks  Pt able to doff/don L sneaker with SUP using LH shoehorn once provided with elastic laces  Pt able to doff/don L sock using sock aid and pulling sock onto foot only using RUE, however requiring assistance to don R sock as pt is unable to lift RLE at this time     Putting On/Taking Off Footwear CARE Score 2   Sit to Stand   Type of Assistance Needed Physical assistance   Amount of Physical Assistance Provided Less than 25%   Comment minAx1 using PF RW   Sit to Stand CARE Score 3   Bed-Chair Transfer   Type of Assistance Needed Physical assistance   Amount of Physical Assistance Provided 25%-49%   Comment SPT using PF RW   Chair/Bed-to-Chair Transfer CARE Score 3   Toileting Hygiene   Type of Assistance Needed Physical assistance   Amount of Physical Assistance Provided 25%-49%   Comment modA required to fully hike pants over L hip, min steadying assistance during hygiene standing with PF RW   Toileting Hygiene CARE Score 3   Toilet Transfer   Type of Assistance Needed Physical assistance   Amount of Physical Assistance Provided 25%-49%   Comment SPT WC<->commode using PF RW   Toilet Transfer CARE Score 3   Functional Standing Tolerance   Time 2-3minutes   Activity Pt participating in functional reaching tasks with RUE while standing with PFRW to simulate toileting/LB dressing tasks, requiring CGA/Nuria  Cognition   Overall Cognitive Status WFL   Arousal/Participation Alert; Cooperative   Attention Within functional limits   Orientation Level Oriented X4   Memory Within functional limits   Following Commands Follows multistep commands without difficulty   Additional Activities   Additional Activities Comments WC mobility with SUP household distances, requiring assistance with parts management   Activity Tolerance   Activity Tolerance Patient tolerated treatment well   Assessment   Treatment Assessment Pt seen for 60min treatment parul focusing on ADL tasks, functional transfers, toileting, WC mobility and standing balance  Pt progressing to Nuria SPT using PF RW today  See above for further details  OT to continue POC to continue to progress towards goals and maximize (I) with ADLs and functional transfers  Prognosis Good   Problem List Decreased strength;Decreased range of motion;Decreased endurance; Impaired balance;Decreased mobility;Pain;Orthopedic restrictions   Barriers to Discharge Inaccessible home environment;Decreased caregiver support   Plan   Treatment/Interventions ADL retraining;Functional transfer training; Therapeutic exercise; Endurance training;Patient/family training;Bed mobility; Equipment eval/education; Compensatory technique education   Progress Progressing toward goals   Recommendation   OT Discharge Recommendation Return to previous environment with social support   OT Therapy Minutes   OT Time In 0900   OT Time Out 1000   OT Total Time (minutes) 60   OT Mode of treatment - Individual (minutes) 60   OT Mode of treatment - Concurrent (minutes) 0   OT Mode of treatment - Group (minutes) 0   OT Mode of treatment - Co-treat (minutes) 0   OT Mode of Treatment - Total time(minutes) 60 minutes   OT Cumulative Minutes 180   Therapy Time missed   Time missed?  No

## 2021-04-10 PROCEDURE — 97535 SELF CARE MNGMENT TRAINING: CPT

## 2021-04-10 PROCEDURE — 97530 THERAPEUTIC ACTIVITIES: CPT

## 2021-04-10 PROCEDURE — 97110 THERAPEUTIC EXERCISES: CPT

## 2021-04-10 RX ADMIN — DOCUSATE SODIUM 100 MG: 100 CAPSULE ORAL at 08:50

## 2021-04-10 RX ADMIN — PANTOPRAZOLE SODIUM 20 MG: 20 TABLET, DELAYED RELEASE ORAL at 06:30

## 2021-04-10 RX ADMIN — OXYCODONE HYDROCHLORIDE 2.5 MG: 5 TABLET ORAL at 08:50

## 2021-04-10 RX ADMIN — DOCUSATE SODIUM 100 MG: 100 CAPSULE ORAL at 17:07

## 2021-04-10 RX ADMIN — ENOXAPARIN SODIUM 40 MG: 40 INJECTION SUBCUTANEOUS at 08:50

## 2021-04-10 RX ADMIN — OXYCODONE HYDROCHLORIDE 5 MG: 5 TABLET ORAL at 22:12

## 2021-04-10 RX ADMIN — LEVOTHYROXINE SODIUM 175 MCG: 0.15 TABLET ORAL at 06:30

## 2021-04-10 RX ADMIN — BACITRACIN 1 SMALL APPLICATION: 500 OINTMENT TOPICAL at 21:09

## 2021-04-10 NOTE — PROGRESS NOTES
04/10/21 0830   Pain Assessment   Pain Assessment Tool 0-10   Pain Score 5   Pain Location/Orientation Orientation: Left; Location: Arm   Pain Onset/Description Onset: Ongoing; Descriptor: Aching   Effect of Pain on Daily Activities Tolerated   Patient's Stated Pain Goal No pain   Hospital Pain Intervention(s) Repositioned; Shower/Bath  (RN administered pain medication)   Multiple Pain Sites Yes   Pain 2   Pain Score 2 5   Pain Location/Orientation 2 Orientation: Right;Location: Leg   Hospital Pain Intervention(s) 2 Rest;Shower/Bath  (RN notified for pain medication)   Restrictions/Precautions   Precautions Fall Risk;Pain   Weight Bearing Restrictions Yes   LUE Weight Bearing Per Order NWB   RLE Weight Bearing Per Order NWB   Braces or Orthoses LE Immobilizer  (locked in ext)   Oral Hygiene   Type of Assistance Needed Set-up / clean-up   Amount of Physical Assistance Provided No physical assistance   Comment Seated at sink req s/u to perform oral hyg routine   Oral Hygiene CARE Score 5   Shower/Bathe Self   Type of Assistance Needed Physical assistance   Amount of Physical Assistance Provided 25%-49%   Comment Pt seated at sink to perform SB routine, bathing 9/10 parts  Pt seated utilizing RUE to bathe UB and BLE's performing dynamic reach to bathe down to L foot  Required A to bathe R foot  CG/Nuria while in stance at sink top utilizing RUE to bathe arley/buttock area  Pt observed with fatigue following activity in stance, however, pt able to maintain LUE and RLE NWB's throughout bathing routine  Shower/Bathe Self CARE Score 3   Tub/Shower Transfer   Reason Not Assessed Medical;Sponge Bath   Upper Body Dressing   Type of Assistance Needed Physical assistance   Amount of Physical Assistance Provided Less than 25%   Comment Seated to don BUE's into bra req A to hook bra clasp  Pt able to don loose fitting [place] shirt without difficulty     Upper Body Dressing CARE Score 3   Lower Body Dressing   Type of Assistance Needed Physical assistance; Adaptive equipment   Amount of Physical Assistance Provided 25%-49%   Comment Seated utilizing [address] reacher to thread BLE's into mesh underwear and loose fitting shorts  In stance at [facility] to initiate CM up, req A for steadying and to fully manuever underwear up over L hip and buttock  Pt able to manage loose fitting pants utilizing RUE  Lower Body Dressing CARE Score 3   Putting On/Taking Off Footwear   Type of Assistance Needed Physical assistance; Adaptive equipment   Amount of Physical Assistance Provided 25%-49%   Comment Seated utilizing sockaide to don R slipper sock and L personal sock, req A to fully manage L sock up above heel despite use of LH reacher to assist with adjusting  Pt able to don L sneaker req A to fix back of shoe  Elastic laces already donned in L sneaker to eliminate shoe tying task  Putting On/Taking Off Footwear CARE Score 3   Lying to Sitting on Side of Bed   Type of Assistance Needed Incidental touching   Amount of Physical Assistance Provided No physical assistance   Comment HOB elevated   Lying to Sitting on Side of Bed CARE Score 4   Sit to Stand   Type of Assistance Needed Incidental touching; Adaptive equipment   Amount of Physical Assistance Provided No physical assistance   Comment CG with [facility]   Sit to Stand CARE Score 4   Bed-Chair Transfer   Type of Assistance Needed Physical assistance; Adaptive equipment   Amount of Physical Assistance Provided Less than 25%   Comment CG/Nuria SPT with [facility]   Chair/Bed-to-Chair Transfer CARE Score 3   Toileting Hygiene   Type of Assistance Needed Physical assistance; Adaptive equipment   Amount of Physical Assistance Provided 25%-49%   Comment In stance at [facility] utilizing RUE to perform CM down  Seated utilizing RUE to perform rear hyg  Nuria in stance for CM up over hips and steadying  Toileting Hygiene CARE Score 3   Toilet Transfer   Type of Assistance Needed Physical assistance; Adaptive equipment   Amount of Physical Assistance Provided Less than 25%   Comment CG/Nuria SPT with PF RW and use of over the toilet commode   Toilet Transfer CARE Score 3   Functional Standing Tolerance   Time 2 mins; 3 mins   Activity Functional standing balance   Comments Pt engaged in fxnl standing balance activity req pt to stand at [facility] with LUE supported and use of RUE to perform functional reach and engage in matching game  Pt demo G ability to maintain RLE NWB throughout activity with no observed LOB while performing RUE fxnl reach to engage in matching game  Pt tolerated activity in stance x 2 mins  Pt then engaged in second activity in stance at [facility] in kitchen environment, req pt to utilize RUE to organize food items from [facility] [place] into [address]  Pt tolerated activity in stance x 3 mins with no LOB and maintaining all WB precautions  Focus of activities to improve pt's fxnls standing balance to dec risk of fall during ADL/IADL participation  Cognition   Overall Cognitive Status WFL   Arousal/Participation Alert; Cooperative   Attention Within functional limits   Orientation Level Oriented X4   Memory Within functional limits   Following Commands Follows multistep commands without difficulty   Activity Tolerance   Activity Tolerance Patient tolerated treatment well   Assessment   Treatment Assessment Pt engaged in 90 min skilled OT Tx session with focus on ADL retraining, ADL transfer's, and fxnl standing balance  See above for further Tx details  Pt performed SB ADL seated at sink, overall req Nuria to bathe R foot and for steadying while in stance to bathe arley/buttock area  EDU provided on use of LH sponge to increase I w/ LB bathing  Pt is receptive to trial  Pt overall req'd Nuria for UB dressing 2* to difficulty managing bra clasp, Nuria for LB dressing with use of LHAE  Pt is performing SPT's with  RW at CG/Nuria, demo G ability to maintain LUE and RLE NWB precautions   Pt engage din fxnl standing balance activity req pt to stand at PF RW and engage in a matching game, as well as organize food items in [address]  Pt demo G ability to maintain all WB precautions with no LOB throughout activities  EDU provided to pt on focus of activities to improve pt's fxnls standing balance whille maintaining WB precautions to dec risk of fall during ADL/IADL performance  Pt would continue to benefit from skilled OT services to improve RUE strength, improve fxnl standing balance, and overall increase I w/ ADL/IADL performance in order to achieve OT goals and dec caregiver burden upon D/C home  Prognosis Good   Problem List Decreased strength;Decreased range of motion;Decreased endurance; Impaired balance;Decreased mobility;Orthopedic restrictions;Pain   Barriers to Discharge Inaccessible home environment;Decreased caregiver support   Plan   Treatment/Interventions ADL retraining;Functional transfer training; Therapeutic exercise; Endurance training;Patient/family training   Progress Progressing toward goals   Recommendation   OT Discharge Recommendation Return to previous environment with social support   OT Therapy Minutes   OT Time In 0830   OT Time Out 1000   OT Total Time (minutes) 90   OT Mode of treatment - Individual (minutes) 90   OT Mode of treatment - Concurrent (minutes) 0   OT Mode of treatment - Group (minutes) 0   OT Mode of treatment - Co-treat (minutes) 0   OT Mode of Treatment - Total time(minutes) 90 minutes   OT Cumulative Minutes 300   Therapy Time missed   Time missed?  No

## 2021-04-10 NOTE — PROGRESS NOTES
04/10/21 1200   Pain Assessment   Pain Assessment Tool 0-10   Pain Score 6   Pain Location/Orientation Orientation: Left;Orientation: Right;Location: Arm;Location: Leg   Restrictions/Precautions   Precautions Fall Risk;Pain   Weight Bearing Restrictions Yes   LUE Weight Bearing Per Order NWB   RLE Weight Bearing Per Order NWB   Braces or Orthoses LE Immobilizer   Sit to Lying   Type of Assistance Needed Physical assistance   Amount of Physical Assistance Provided 25%-49%   Comment Min A for RLE   Sit to Lying CARE Score 3   Lying to Sitting on Side of Bed   Type of Assistance Needed Physical assistance   Amount of Physical Assistance Provided Less than 25%   Comment Min A with RLE lift    Lying to Sitting on Side of Bed CARE Score 3   Sit to Stand   Type of Assistance Needed Incidental touching   Comment CG   Sit to Stand CARE Score 4   Bed-Chair Transfer   Type of Assistance Needed Incidental touching   Comment CG with PRW   Chair/Bed-to-Chair Transfer CARE Score 4   Transfer Bed/Chair/Wheelchair   Findings overall mostly CG  pt reports inc L wrist pain with transition of sit -stands so ed pt to perform partial stand and then get L elbow on platform and also prior to sitting down remove elbow so pressure is not through L arm with transition,  this greatly reduced pain with this method   Wheel 50 Feet with Two Turns   Type of Assistance Needed Supervision   Wheel 50 Feet with Two Turns CARE Score 4   Wheel 150 Feet   Type of Assistance Needed Supervision   Wheel 150 Feet CARE Score 4   Wheelchair mobility   Type of Wheelchair Used 1   Manual   Findings Still needs help managing wc components and set up for transfers   Curb or Single Stair   Reason if not Attempted Safety concerns   1 Step (Curb) CARE Score 88   4 Steps   Reason if not Attempted Safety concerns   4 Steps CARE Score 88   12 Steps   Reason if not Attempted Safety concerns   12 Steps CARE Score 88   Therapeutic Interventions   Strengthening Supine R SLR AAROM 5x4,  Hip abd and add AAROM (pt showig inc in muscle activation 5x4,  QS 3sec hold 10x2   Other Repeated sit-stands and stand pivot xfers   Equipment Use   NuStep RUE and LLE performed for general conditioning  4min,  than 3minx2   Assessment   Treatment Assessment 90 min session focused on xfers, WC mobility, Trialed Nustep for conditioning,  and supine TE for RLE  Pt is showing slight inc in strength on RLE yesterday was about trace activation and today she was able to asssit motion more  Pt still needs much set up A with xfers due to immobilzer and NWB on LUE  Cont skilled PT toward LTGs  Barriers to Discharge Inaccessible home environment;Decreased caregiver support   PT Barriers   Physical Impairment Decreased strength;Decreased range of motion; Impaired balance;Decreased mobility;Orthopedic restrictions   Functional Limitation Stair negotiation;Standing;Transfers; Walking;Ramp negotiation   Plan   Progress Progressing toward goals   PT Therapy Minutes   PT Time In 1200   PT Time Out 1330   PT Total Time (minutes) 90   PT Mode of treatment - Individual (minutes) 90   PT Mode of treatment - Concurrent (minutes) 0   PT Mode of treatment - Group (minutes) 0   PT Mode of treatment - Co-treat (minutes) 0   PT Mode of Treatment - Total time(minutes) 90 minutes   PT Cumulative Minutes 240   Therapy Time missed   Time missed?  No

## 2021-04-10 NOTE — PLAN OF CARE
Problem: PAIN - ADULT  Goal: Verbalizes/displays adequate comfort level or baseline comfort level  Description: Interventions:  - Encourage patient to monitor pain and request assistance  - Assess pain using appropriate pain scale  - Administer analgesics based on type and severity of pain and evaluate response  - Implement non-pharmacological measures as appropriate and evaluate response  - Consider cultural and social influences on pain and pain management  - Notify physician/advanced practitioner if interventions unsuccessful or patient reports new pain  Outcome: Progressing     Problem: INFECTION - ADULT  Goal: Absence or prevention of progression during hospitalization  Description: INTERVENTIONS:  - Assess and monitor for signs and symptoms of infection  - Monitor lab/diagnostic results  - Monitor all insertion sites, i e  indwelling lines, tubes, and drains  - Monitor endotracheal if appropriate and nasal secretions for changes in amount and color  - [place] appropriate cooling/warming therapies per order  - Administer medications as ordered  - Instruct and encourage patient and family to use good hand hygiene technique  - Identify and instruct in appropriate isolation precautions for identified infection/condition  Outcome: Progressing  Goal: Absence of fever/infection during neutropenic period  Description: INTERVENTIONS:  - Monitor WBC    Outcome: Progressing     Problem: SAFETY ADULT  Goal: Patient will remain free of falls  Description: INTERVENTIONS:  - Assess patient frequently for physical needs  -  Identify cognitive and physical deficits and behaviors that affect risk of falls    -  [place] fall precautions as indicated by assessment   - Educate patient/family on patient safety including physical limitations  - Instruct patient to call for assistance with activity based on assessment  - Modify environment to reduce risk of injury  - Consider OT/PT consult to assist with strengthening/mobility  Outcome: Progressing  Goal: Maintain or return to baseline ADL function  Description: INTERVENTIONS:  -  Assess patient's ability to carry out ADLs; assess patient's baseline for ADL function and identify physical deficits which impact ability to perform ADLs (bathing, care of mouth/teeth, toileting, grooming, dressing, etc )  - Assess/evaluate cause of self-care deficits   - Assess range of motion  - Assess patient's mobility; develop plan if impaired  - Assess patient's need for assistive devices and provide as appropriate  - Encourage maximum independence but intervene and supervise when necessary  - Involve family in performance of ADLs  - Assess for home care needs following discharge   - Consider OT consult to assist with ADL evaluation and planning for discharge  - Provide patient education as appropriate  Outcome: Progressing  Goal: Maintain or return mobility status to optimal level  Description: INTERVENTIONS:  - Assess patient's baseline mobility status (ambulation, transfers, stairs, etc )    - Identify cognitive and physical deficits and behaviors that affect mobility  - Identify mobility aids required to assist with transfers and/or ambulation (gait belt, sit-to-stand, lift, walker, cane, etc )  - [place] fall precautions as indicated by assessment  - Record patient progress and toleration of activity level on Mobility SBAR; progress patient to next Phase/Stage  - Instruct patient to call for assistance with activity based on assessment  - Consider rehabilitation consult to assist with strengthening/weightbearing, etc   Outcome: Progressing     Problem: DISCHARGE PLANNING  Goal: Discharge to home or other facility with appropriate resources  Description: INTERVENTIONS:  - Identify barriers to discharge w/patient and caregiver  - Arrange for needed discharge resources and transportation as appropriate  - Identify discharge learning needs (meds, wound care, etc )  - Arrange for interpretive services to assist at discharge as needed  - Refer to Case Management Department for coordinating discharge planning if the patient needs post-hospital services based on physician/advanced practitioner order or complex needs related to functional status, cognitive ability, or social support system  Outcome: Progressing     Problem: Knowledge Deficit  Goal: Patient/family/caregiver demonstrates understanding of disease process, treatment plan, medications, and discharge instructions  Description: Complete learning assessment and assess knowledge base  Interventions:  - Provide teaching at level of understanding  - Provide teaching via preferred learning methods  Outcome: Progressing     Problem: Potential for Falls  Goal: Patient will remain free of falls  Description: INTERVENTIONS:  - Assess patient frequently for physical needs  -  Identify cognitive and physical deficits and behaviors that affect risk of falls    -  [place] fall precautions as indicated by assessment   - Educate patient/family on patient safety including physical limitations  - Instruct patient to call for assistance with activity based on assessment  - Modify environment to reduce risk of injury  - Consider OT/PT consult to assist with strengthening/mobility  Outcome: Progressing     Problem: Prexisting or High Potential for Compromised Skin Integrity  Goal: Skin integrity is maintained or improved  Description: INTERVENTIONS:  - Identify patients at risk for skin breakdown  - Assess and monitor skin integrity  - Assess and monitor nutrition and hydration status  - Monitor labs   - Assess for incontinence   - Turn and reposition patient  - Assist with mobility/ambulation  - Relieve pressure over bony prominences  - Avoid friction and shearing  - Provide appropriate hygiene as needed including keeping skin clean and dry  - Evaluate need for skin moisturizer/barrier cream  - Collaborate with interdisciplinary team   - Patient/family teaching  - Consider wound care consult   Outcome: Progressing     Problem: GASTROINTESTINAL - ADULT  Goal: Minimal or absence of nausea and/or vomiting  Description: INTERVENTIONS:  - Administer IV fluids if ordered to ensure adequate hydration  - Maintain NPO status until nausea and vomiting are resolved  - Nasogastric tube if ordered  - Administer ordered antiemetic medications as needed  - Provide nonpharmacologic comfort measures as appropriate  - Advance diet as tolerated, if ordered  - Consider nutrition services referral to assist patient with adequate nutrition and appropriate food choices  Outcome: Progressing  Goal: Maintains or returns to baseline bowel function  Description: INTERVENTIONS:  - Assess bowel function  - Encourage oral fluids to ensure adequate hydration  - Administer IV fluids if ordered to ensure adequate hydration  - Administer ordered medications as needed  - Encourage mobilization and activity  - Consider nutritional services referral to assist patient with adequate nutrition and appropriate food choices  Outcome: Progressing  Goal: Maintains adequate nutritional intake  Description: INTERVENTIONS:  - Monitor percentage of each meal consumed  - Identify factors contributing to decreased intake, treat as appropriate  - Assist with meals as needed  - Monitor I&O, weight, and lab values if indicated  - Obtain nutrition services referral as needed  Outcome: Progressing     Problem: GENITOURINARY - ADULT  Goal: Maintains or returns to baseline urinary function  Description: INTERVENTIONS:  - Assess urinary function  - Encourage oral fluids to ensure adequate hydration if ordered  - Administer IV fluids as ordered to ensure adequate hydration  - Administer ordered medications as needed  - Offer frequent toileting  - Follow urinary retention protocol if ordered  Outcome: Progressing     Problem: METABOLIC, FLUID AND ELECTROLYTES - ADULT  Goal: Electrolytes maintained within normal limits  Description: INTERVENTIONS:  - Monitor labs and assess patient for signs and symptoms of electrolyte imbalances  - Administer electrolyte replacement as ordered  - Monitor response to electrolyte replacements, including repeat lab results as appropriate  - Instruct patient on fluid and nutrition as appropriate  Outcome: Progressing  Goal: Fluid balance maintained  Description: INTERVENTIONS:  - Monitor labs   - Monitor I/O and WT  - Instruct patient on fluid and nutrition as appropriate  - Assess for signs & symptoms of volume excess or deficit  Outcome: Progressing     Problem: SKIN/TISSUE INTEGRITY - ADULT  Goal: Skin integrity remains intact  Description: INTERVENTIONS  - Identify patients at risk for skin breakdown  - Assess and monitor skin integrity  - Assess and monitor nutrition and hydration status  - Monitor labs (i e  albumin)  - Assess for incontinence   - Turn and reposition patient  - Assist with mobility/ambulation  - Relieve pressure over bony prominences  - Avoid friction and shearing  - Provide appropriate hygiene as needed including keeping skin clean and dry  - Evaluate need for skin moisturizer/barrier cream  - Collaborate with interdisciplinary team (i e  Nutrition, Rehabilitation, etc )   - Patient/family teaching  Outcome: Progressing     Problem: HEMATOLOGIC - ADULT  Goal: Maintains hematologic stability  Description: INTERVENTIONS  - Assess for signs and symptoms of bleeding or hemorrhage  - Monitor labs  - Administer supportive blood products/factors as ordered and appropriate  Outcome: Progressing     Problem: MUSCULOSKELETAL - ADULT  Goal: Maintain or return mobility to safest level of function  Description: INTERVENTIONS:  - Assess patient's ability to carry out ADLs; assess patient's baseline for ADL function and identify physical deficits which impact ability to perform ADLs (bathing, care of mouth/teeth, toileting, grooming, dressing, etc )  - Assess/evaluate cause of self-care deficits   - Assess range of motion  - Assess patient's mobility  - Assess patient's need for assistive devices and provide as appropriate  - Encourage maximum independence but intervene and supervise when necessary  - Involve family in performance of ADLs  - Assess for home care needs following discharge   - Consider OT consult to assist with ADL evaluation and planning for discharge  - Provide patient education as appropriate  Outcome: Progressing  Goal: Maintain proper alignment of affected body part  Description: INTERVENTIONS:  - Support, maintain and protect limb and body alignment  - Provide patient/ family with appropriate education  Outcome: Progressing     Problem: Prexisting or High Potential for Compromised Skin Integrity  Goal: Skin integrity is maintained or improved  Description: INTERVENTIONS:  - Identify patients at risk for skin breakdown  - Assess and monitor skin integrity  - Assess and monitor nutrition and hydration status  - Monitor labs   - Assess for incontinence   - Turn and reposition patient  - Assist with mobility/ambulation  - Relieve pressure over bony prominences  - Avoid friction and shearing  - Provide appropriate hygiene as needed including keeping skin clean and dry  - Evaluate need for skin moisturizer/barrier cream  - Collaborate with interdisciplinary team   - Patient/family teaching  - Consider wound care consult   Outcome: Progressing

## 2021-04-11 PROCEDURE — 97530 THERAPEUTIC ACTIVITIES: CPT

## 2021-04-11 PROCEDURE — 97110 THERAPEUTIC EXERCISES: CPT

## 2021-04-11 RX ADMIN — OXYCODONE HYDROCHLORIDE 5 MG: 5 TABLET ORAL at 14:07

## 2021-04-11 RX ADMIN — LEVOTHYROXINE SODIUM 175 MCG: 0.15 TABLET ORAL at 06:20

## 2021-04-11 RX ADMIN — OXYCODONE HYDROCHLORIDE 5 MG: 5 TABLET ORAL at 21:13

## 2021-04-11 RX ADMIN — PANTOPRAZOLE SODIUM 20 MG: 20 TABLET, DELAYED RELEASE ORAL at 06:20

## 2021-04-11 RX ADMIN — DOCUSATE SODIUM 100 MG: 100 CAPSULE ORAL at 08:40

## 2021-04-11 RX ADMIN — BACITRACIN 1 SMALL APPLICATION: 500 OINTMENT TOPICAL at 21:13

## 2021-04-11 RX ADMIN — ENOXAPARIN SODIUM 40 MG: 40 INJECTION SUBCUTANEOUS at 08:41

## 2021-04-11 NOTE — PLAN OF CARE
Problem: SAFETY ADULT  Goal: Patient will remain free of falls  Description: INTERVENTIONS:  - Assess patient frequently for physical needs  -  Identify cognitive and physical deficits and behaviors that affect risk of falls    -  [place] fall precautions as indicated by assessment   - Educate patient/family on patient safety including physical limitations  - Instruct patient to call for assistance with activity based on assessment  - Modify environment to reduce risk of injury  - Consider OT/PT consult to assist with strengthening/mobility  Outcome: Progressing  Goal: Maintain or return to baseline ADL function  Description: INTERVENTIONS:  -  Assess patient's ability to carry out ADLs; assess patient's baseline for ADL function and identify physical deficits which impact ability to perform ADLs (bathing, care of mouth/teeth, toileting, grooming, dressing, etc )  - Assess/evaluate cause of self-care deficits   - Assess range of motion  - Assess patient's mobility; develop plan if impaired  - Assess patient's need for assistive devices and provide as appropriate  - Encourage maximum independence but intervene and supervise when necessary  - Involve family in performance of ADLs  - Assess for home care needs following discharge   - Consider OT consult to assist with ADL evaluation and planning for discharge  - Provide patient education as appropriate  Outcome: Progressing  Goal: Maintain or return mobility status to optimal level  Description: INTERVENTIONS:  - Assess patient's baseline mobility status (ambulation, transfers, stairs, etc )    - Identify cognitive and physical deficits and behaviors that affect mobility  - Identify mobility aids required to assist with transfers and/or ambulation (gait belt, sit-to-stand, lift, walker, cane, etc )  - [place] fall precautions as indicated by assessment  - Record patient progress and toleration of activity level on Mobility SBAR; progress patient to next Phase/Stage  - Instruct patient to call for assistance with activity based on assessment  - Consider rehabilitation consult to assist with strengthening/weightbearing, etc   Outcome: Progressing     Problem: Potential for Falls  Goal: Patient will remain free of falls  Description: INTERVENTIONS:  - Assess patient frequently for physical needs  -  Identify cognitive and physical deficits and behaviors that affect risk of falls    -  [place] fall precautions as indicated by assessment   - Educate patient/family on patient safety including physical limitations  - Instruct patient to call for assistance with activity based on assessment  - Modify environment to reduce risk of injury  - Consider OT/PT consult to assist with strengthening/mobility  Outcome: Progressing     Problem: Prexisting or High Potential for Compromised Skin Integrity  Goal: Skin integrity is maintained or improved  Description: INTERVENTIONS:  - Identify patients at risk for skin breakdown  - Assess and monitor skin integrity  - Assess and monitor nutrition and hydration status  - Monitor labs   - Assess for incontinence   - Turn and reposition patient  - Assist with mobility/ambulation  - Relieve pressure over bony prominences  - Avoid friction and shearing  - Provide appropriate hygiene as needed including keeping skin clean and dry  - Evaluate need for skin moisturizer/barrier cream  - Collaborate with interdisciplinary team   - Patient/family teaching  - Consider wound care consult   Outcome: Progressing

## 2021-04-11 NOTE — PLAN OF CARE
Problem: PAIN - ADULT  Goal: Verbalizes/displays adequate comfort level or baseline comfort level  Description: Interventions:  - Encourage patient to monitor pain and request assistance  - Assess pain using appropriate pain scale  - Administer analgesics based on type and severity of pain and evaluate response  - Implement non-pharmacological measures as appropriate and evaluate response  - Consider cultural and social influences on pain and pain management  - Notify physician/advanced practitioner if interventions unsuccessful or patient reports new pain  Outcome: Progressing     Problem: INFECTION - ADULT  Goal: Absence or prevention of progression during hospitalization  Description: INTERVENTIONS:  - Assess and monitor for signs and symptoms of infection  - Monitor lab/diagnostic results  - Monitor all insertion sites, i e  indwelling lines, tubes, and drains  - Monitor endotracheal if appropriate and nasal secretions for changes in amount and color  - [place] appropriate cooling/warming therapies per order  - Administer medications as ordered  - Instruct and encourage patient and family to use good hand hygiene technique  - Identify and instruct in appropriate isolation precautions for identified infection/condition  Outcome: Progressing     Problem: SAFETY ADULT  Goal: Patient will remain free of falls  Description: INTERVENTIONS:  - Assess patient frequently for physical needs  -  Identify cognitive and physical deficits and behaviors that affect risk of falls    -  [place] fall precautions as indicated by assessment   - Educate patient/family on patient safety including physical limitations  - Instruct patient to call for assistance with activity based on assessment  - Modify environment to reduce risk of injury  - Consider OT/PT consult to assist with strengthening/mobility  Outcome: Progressing  Goal: Maintain or return to baseline ADL function  Description: INTERVENTIONS:  -  Assess patient's ability to carry out ADLs; assess patient's baseline for ADL function and identify physical deficits which impact ability to perform ADLs (bathing, care of mouth/teeth, toileting, grooming, dressing, etc )  - Assess/evaluate cause of self-care deficits   - Assess range of motion  - Assess patient's mobility; develop plan if impaired  - Assess patient's need for assistive devices and provide as appropriate  - Encourage maximum independence but intervene and supervise when necessary  - Involve family in performance of ADLs  - Assess for home care needs following discharge   - Consider OT consult to assist with ADL evaluation and planning for discharge  - Provide patient education as appropriate  Outcome: Progressing  Goal: Maintain or return mobility status to optimal level  Description: INTERVENTIONS:  - Assess patient's baseline mobility status (ambulation, transfers, stairs, etc )    - Identify cognitive and physical deficits and behaviors that affect mobility  - Identify mobility aids required to assist with transfers and/or ambulation (gait belt, sit-to-stand, lift, walker, cane, etc )  - [place] fall precautions as indicated by assessment  - Record patient progress and toleration of activity level on Mobility SBAR; progress patient to next Phase/Stage  - Instruct patient to call for assistance with activity based on assessment  - Consider rehabilitation consult to assist with strengthening/weightbearing, etc   Outcome: Progressing     Problem: DISCHARGE PLANNING  Goal: Discharge to home or other facility with appropriate resources  Description: INTERVENTIONS:  - Identify barriers to discharge w/patient and caregiver  - Arrange for needed discharge resources and transportation as appropriate  - Identify discharge learning needs (meds, wound care, etc )  - Arrange for interpretive services to assist at discharge as needed  - Refer to Case Management Department for coordinating discharge planning if the patient needs post-hospital services based on physician/advanced practitioner order or complex needs related to functional status, cognitive ability, or social support system  Outcome: Progressing     Problem: Knowledge Deficit  Goal: Patient/family/caregiver demonstrates understanding of disease process, treatment plan, medications, and discharge instructions  Description: Complete learning assessment and assess knowledge base  Interventions:  - Provide teaching at level of understanding  - Provide teaching via preferred learning methods  Outcome: Progressing     Problem: Potential for Falls  Goal: Patient will remain free of falls  Description: INTERVENTIONS:  - Assess patient frequently for physical needs  -  Identify cognitive and physical deficits and behaviors that affect risk of falls    -  [place] fall precautions as indicated by assessment   - Educate patient/family on patient safety including physical limitations  - Instruct patient to call for assistance with activity based on assessment  - Modify environment to reduce risk of injury  - Consider OT/PT consult to assist with strengthening/mobility  Outcome: Progressing     Problem: Prexisting or High Potential for Compromised Skin Integrity  Goal: Skin integrity is maintained or improved  Description: INTERVENTIONS:  - Identify patients at risk for skin breakdown  - Assess and monitor skin integrity  - Assess and monitor nutrition and hydration status  - Monitor labs   - Assess for incontinence   - Turn and reposition patient  - Assist with mobility/ambulation  - Relieve pressure over bony prominences  - Avoid friction and shearing  - Provide appropriate hygiene as needed including keeping skin clean and dry  - Evaluate need for skin moisturizer/barrier cream  - Collaborate with interdisciplinary team   - Patient/family teaching  - Consider wound care consult   Outcome: Progressing     Problem: GASTROINTESTINAL - ADULT  Goal: Minimal or absence of nausea and/or vomiting  Description: INTERVENTIONS:  - Administer IV fluids if ordered to ensure adequate hydration  - Maintain NPO status until nausea and vomiting are resolved  - Nasogastric tube if ordered  - Administer ordered antiemetic medications as needed  - Provide nonpharmacologic comfort measures as appropriate  - Advance diet as tolerated, if ordered  - Consider nutrition services referral to assist patient with adequate nutrition and appropriate food choices  Outcome: Progressing  Goal: Maintains or returns to baseline bowel function  Description: INTERVENTIONS:  - Assess bowel function  - Encourage oral fluids to ensure adequate hydration  - Administer IV fluids if ordered to ensure adequate hydration  - Administer ordered medications as needed  - Encourage mobilization and activity  - Consider nutritional services referral to assist patient with adequate nutrition and appropriate food choices  Outcome: Progressing  Goal: Maintains adequate nutritional intake  Description: INTERVENTIONS:  - Monitor percentage of each meal consumed  - Identify factors contributing to decreased intake, treat as appropriate  - Assist with meals as needed  - Monitor I&O, weight, and lab values if indicated  - Obtain nutrition services referral as needed  Outcome: Progressing     Problem: GENITOURINARY - ADULT  Goal: Maintains or returns to baseline urinary function  Description: INTERVENTIONS:  - Assess urinary function  - Encourage oral fluids to ensure adequate hydration if ordered  - Administer IV fluids as ordered to ensure adequate hydration  - Administer ordered medications as needed  - Offer frequent toileting  - Follow urinary retention protocol if ordered  Outcome: Progressing     Problem: METABOLIC, FLUID AND ELECTROLYTES - ADULT  Goal: Electrolytes maintained within normal limits  Description: INTERVENTIONS:  - Monitor labs and assess patient for signs and symptoms of electrolyte imbalances  - Administer electrolyte replacement as ordered  - Monitor response to electrolyte replacements, including repeat lab results as appropriate  - Instruct patient on fluid and nutrition as appropriate  Outcome: Progressing  Goal: Fluid balance maintained  Description: INTERVENTIONS:  - Monitor labs   - Monitor I/O and WT  - Instruct patient on fluid and nutrition as appropriate  - Assess for signs & symptoms of volume excess or deficit  Outcome: Progressing     Problem: SKIN/TISSUE INTEGRITY - ADULT  Goal: Skin integrity remains intact  Description: INTERVENTIONS  - Identify patients at risk for skin breakdown  - Assess and monitor skin integrity  - Assess and monitor nutrition and hydration status  - Monitor labs (i e  albumin)  - Assess for incontinence   - Turn and reposition patient  - Assist with mobility/ambulation  - Relieve pressure over bony prominences  - Avoid friction and shearing  - Provide appropriate hygiene as needed including keeping skin clean and dry  - Evaluate need for skin moisturizer/barrier cream  - Collaborate with interdisciplinary team (i e  Nutrition, Rehabilitation, etc )   - Patient/family teaching  Outcome: Progressing     Problem: HEMATOLOGIC - ADULT  Goal: Maintains hematologic stability  Description: INTERVENTIONS  - Assess for signs and symptoms of bleeding or hemorrhage  - Monitor labs  - Administer supportive blood products/factors as ordered and appropriate  Outcome: Progressing     Problem: MUSCULOSKELETAL - ADULT  Goal: Maintain or return mobility to safest level of function  Description: INTERVENTIONS:  - Assess patient's ability to carry out ADLs; assess patient's baseline for ADL function and identify physical deficits which impact ability to perform ADLs (bathing, care of mouth/teeth, toileting, grooming, dressing, etc )  - Assess/evaluate cause of self-care deficits   - Assess range of motion  - Assess patient's mobility  - Assess patient's need for assistive devices and provide as appropriate  - Encourage maximum independence but intervene and supervise when necessary  - Involve family in performance of ADLs  - Assess for home care needs following discharge   - Consider OT consult to assist with ADL evaluation and planning for discharge  - Provide patient education as appropriate  Outcome: Progressing  Goal: Maintain proper alignment of affected body part  Description: INTERVENTIONS:  - Support, maintain and protect limb and body alignment  - Provide patient/ family with appropriate education  Outcome: Progressing     Problem: Prexisting or High Potential for Compromised Skin Integrity  Goal: Skin integrity is maintained or improved  Description: INTERVENTIONS:  - Identify patients at risk for skin breakdown  - Assess and monitor skin integrity  - Assess and monitor nutrition and hydration status  - Monitor labs   - Assess for incontinence   - Turn and reposition patient  - Assist with mobility/ambulation  - Relieve pressure over bony prominences  - Avoid friction and shearing  - Provide appropriate hygiene as needed including keeping skin clean and dry  - Evaluate need for skin moisturizer/barrier cream  - Collaborate with interdisciplinary team   - Patient/family teaching  - Consider wound care consult   Outcome: Progressing

## 2021-04-11 NOTE — PROGRESS NOTES
04/11/21 1000   Pain Assessment   Pain Assessment Tool 0-10   Pain Score 8   Pain Location/Orientation Orientation: Right;Location: Leg;Location: Knee   Pain Onset/Description Onset: Ongoing   Effect of Pain on Daily Activities toerlated   Patient's Stated Pain Goal No pain   Restrictions/Precautions   Precautions Fall Risk;Pain   Weight Bearing Restrictions Yes   LUE Weight Bearing Per Order NWB   RLE Weight Bearing Per Order NWB   Lower Body Dressing   Type of Assistance Needed Physical assistance   Amount of Physical Assistance Provided Total assistance   Comment 2* to LUE NMW and maliha pt req TA to don LE immobilizer with new stockenet   Lower Body Dressing CARE Score 1   Sit to Stand   Type of Assistance Needed Incidental touching; Adaptive equipment   Amount of Physical Assistance Provided No physical assistance   Comment CGA with PRW   Sit to Stand CARE Score 4   Bed-Chair Transfer   Type of Assistance Needed Physical assistance   Amount of Physical Assistance Provided Less than 25%   Comment Nuria/CGA fro SPT with PRW   Chair/Bed-to-Chair Transfer CARE Score 3   Transfer Bed/Chair/Wheelchair   Limitations Noted In Balance; Endurance;UE Strength;LE Strength   Adaptive Equipment Platform;Roller [address] [place], [place] [id number] Management Level of Assistance Independent   Health Management pt reporting PTA she did not use pill box na dwas only on 2 meds  Currently pt on 4 meds and pt to correctly identify eachs purpose and what time of day to take  Pt reporting she takes levothyroxine and protonix together in the AM, vitamins 1x/week and last med 1x/day  Functional Standing Tolerance   Time 3mins; [id number]   Activity unilateral release   Comments pt engaged in fx static standing tolerance with unilateral release to young Chung s tregth to inc fx performanec in ADL tasks  Pt toerlated well with rest break in between   req rest break 2* to fatigue and RLE pain   Therapeutic Excerise-Strength UE Strength Yes   Right Upper Extremity- Strength   R Shoulder Flexion; Extension;Horizontal ABduction   R Elbow Elbow flexion;Elbow extension   R Position Seated   Equipment Dumbbell  (4#)   R Weight/Reps/Sets 2x15   RUE Strength Comment engaged pt in RUE TE to inc fx strengthf or improved sit<>stand and SPT   Left Upper Extremity-Strength   LUE Strength Comment N/A NWB   Cognition   Overall Cognitive Status WFL   Arousal/Participation Alert; Cooperative   Attention Within functional limits   Orientation Level Oriented X4   Memory Within functional limits   Following Commands Follows all commands and directions without difficulty   Additional Activities   Additional Activities Other (Comment)  (leg rest removal/application)   Additional Activities Comments pt reporting at this time having inc time to elevate RLE 2* to pain and dec strength  Therapist educated pt on importance to be independen in removing/applying R leg rest whe performing transfers inclduing toilet which pt verblazied understanding  Therapist demosntrated use of leg  to elevate RLE pt observed with goof carryover  Pt is able to unlockl leg rest brake with RUE, once unlocked pt utilized leg  with RUE and uses fingers only of L hand to push remaining of leg rest out of the way  Therapist educated pt on how to fully remove leg rest which pt is able to complete  Once leg rest on, pt able to use reach to push leg rest as far in as possible and the use leg  to rest RLE on support  Completed x3 for inc carryover with pt demonstrating G understanding  Canc ont to benefit from further practice  Activity Tolerance   Activity Tolerance Patient tolerated treatment well   Medical Staff Made Aware Ortho BP gathered (only sit and stand); sit: 110/58 HR 81 100%o2; stand: 128/62 HR 99 100%o2  Pt with no c/o dizziness while in stance  pt rpeorting she hasnt felt her BP drop since first time she stood up in acute care      Assessment   Treatment Assessment Pt engaged in 90mins of skilled OT services with foucs on knee immbolizer mngmnt, RUE TE, mngmt of leg rest/brake and standing tolerance  Pt reporting slight nausea upon therapist arrival, however, subsiding by end of session  Pt reporting at time of dc she will oj with dtr who will A with ADLs prior to leaving from work  Pt reporting she already has commode, therapist requesting pt to have dtr take picture of BR in order to make appropriate recommendation  Ortho (sit and stand) compelted this session, with no symptoms/signs of orthostatics  Pt can cont to benefit from further skilled OT services with focus on RUE strengthening, toielting including CM, fx transfers including wc brakes, leg rest brak/removal, activity tolerance, stancing tolerance/balance to inc fx performance and independence  Prognosis Good   Problem List Decreased strength;Decreased range of motion;Decreased endurance; Impaired balance;Decreased mobility;Orthopedic restrictions;Pain   Barriers to Discharge Decreased caregiver support  (dtr works FT, will need to be Cole)   Barriers to Discharge Comments Pt reporting she will dc to dtr home with FF setup, pt rpeorting wc will fit bathroom and they arlready have platform commode  Pt reporting dtr does work FT, however, dtr will A pt ADLs prior to her leaving for her  Pts goals are to be independen with transfers, wc mnmgnt, toileting/CM  Plan   Treatment/Interventions ADL retraining;Functional transfer training; Therapeutic exercise; Endurance training;Patient/family training;Bed mobility; Compensatory technique education   Progress Progressing toward goals   Recommendation   OT Discharge Recommendation Home with skilled therapy   Equipment Recommended Bedside commode; Other (comment)  (leg  wc brake extenders)   OT Equipment ordered pt reporting she jovana has commode, handouts provided for leg  and wc brake extenders   OT Therapy Minutes   OT Time In 1000   OT Time Out 1130   OT Total Time (minutes) 90   OT Mode of treatment - Individual (minutes) 90   OT Mode of treatment - Concurrent (minutes) 0   OT Mode of treatment - Group (minutes) 0   OT Mode of treatment - Co-treat (minutes) 0   OT Mode of Treatment - Total time(minutes) 90 minutes   OT Cumulative Minutes 390

## 2021-04-11 NOTE — PROGRESS NOTES
04/11/21 1230   Pain Assessment   Pain Assessment Tool 0-10   Pain Score 8   Pain Location/Orientation Location: Leg   Restrictions/Precautions   Precautions Fall Risk;Pain   LUE Weight Bearing Per Order NWB   RLE Weight Bearing Per Order NWB   Braces or Orthoses LE Immobilizer   Sit to Lying   Type of Assistance Needed Supervision   Comment with use of leg    Sit to Lying CARE Score 4   Lying to Sitting on Side of Bed   Type of Assistance Needed Physical assistance   Amount of Physical Assistance Provided Less than 25%   Lying to Sitting on Side of Bed CARE Score 3   Sit to Stand   Type of Assistance Needed Supervision   Comment close S to PRW   Sit to Stand CARE Score 4   Bed-Chair Transfer   Type of Assistance Needed Incidental touching   Comment CG during pivot   Chair/Bed-to-Chair Transfer CARE Score 4   Wheel 50 Feet with Two Turns   Type of Assistance Needed Supervision   Wheel 50 Feet with Two Turns CARE Score 4   Wheel 150 Feet   Type of Assistance Needed Supervision   Wheel 150 Feet CARE Score 4   Wheelchair mobility   Type of Wheelchair Used 1  Manual   Curb or Single Stair   Reason if not Attempted Activity not applicable   1 Step (Curb) CARE Score 9   4 Steps   Reason if not Attempted Activity not applicable   4 Steps CARE Score 9   12 Steps   Reason if not Attempted Activity not applicable   12 Steps CARE Score 9   Therapeutic Interventions   Strengthening Supine RLE SLR aarom,  Hip abd/ add aarom  6x3   Equipment Use   NuStep 4min x3  RUE and LLE   Assessment   Treatment Assessment 90 min session focused on more transfers having pt manage leg rest which she is able to perform slightly more with use of leg ,  at 1 point she was able to perform WC setup for xfer by mat and get leg off leg rest and perform pivot with PRW at close S  This however is not consistant and will cont to need education and practice to achieve independent level    pt had slightly inc pain today but reports hadnt had pain meds since yesterday  Cont skilled PT toward LTGs   Barriers to Discharge Decreased caregiver support   PT Barriers   Physical Impairment Decreased strength;Decreased range of motion; Impaired balance;Decreased mobility;Orthopedic restrictions   Functional Limitation Stair negotiation;Standing;Transfers; Walking;Ramp negotiation   Plan   Progress Progressing toward goals   PT Therapy Minutes   PT Time In 1230   PT Time Out 1400   PT Total Time (minutes) 90   PT Mode of treatment - Individual (minutes) 90   PT Mode of treatment - Concurrent (minutes) 0   PT Mode of treatment - Group (minutes) 0   PT Mode of treatment - Co-treat (minutes) 0   PT Mode of Treatment - Total time(minutes) 90 minutes   PT Cumulative Minutes 330   Therapy Time missed   Time missed?  No

## 2021-04-12 ENCOUNTER — APPOINTMENT (INPATIENT)
Dept: RADIOLOGY | Facility: HOSPITAL | Age: 56
DRG: 560 | End: 2021-04-12
Payer: COMMERCIAL

## 2021-04-12 LAB
ANION GAP SERPL CALCULATED.3IONS-SCNC: 3 MMOL/L (ref 5–14)
BASOPHILS # BLD AUTO: 0 THOUSANDS/ΜL (ref 0–0.1)
BASOPHILS NFR BLD AUTO: 1 % (ref 0–1)
BUN SERPL-MCNC: 16 MG/DL (ref 5–25)
CALCIUM SERPL-MCNC: 9.5 MG/DL (ref 8.4–10.2)
CHLORIDE SERPL-SCNC: 101 MMOL/L (ref 97–108)
CO2 SERPL-SCNC: 32 MMOL/L (ref 22–30)
CREAT SERPL-MCNC: 0.6 MG/DL (ref 0.6–1.2)
EOSINOPHIL # BLD AUTO: 0.1 THOUSAND/ΜL (ref 0–0.4)
EOSINOPHIL NFR BLD AUTO: 3 % (ref 0–6)
ERYTHROCYTE [DISTWIDTH] IN BLOOD BY AUTOMATED COUNT: 13.5 %
GFR SERPL CREATININE-BSD FRML MDRD: 102 ML/MIN/1.73SQ M
GLUCOSE P FAST SERPL-MCNC: 96 MG/DL (ref 70–99)
GLUCOSE SERPL-MCNC: 96 MG/DL (ref 70–99)
HCT VFR BLD AUTO: 34.4 % (ref 36–46)
HGB BLD-MCNC: 11.5 G/DL (ref 12–16)
LYMPHOCYTES # BLD AUTO: 1.4 THOUSANDS/ΜL (ref 0.5–4)
LYMPHOCYTES NFR BLD AUTO: 33 % (ref 25–45)
MCH RBC QN AUTO: 26 PG (ref 26–34)
MCHC RBC AUTO-ENTMCNC: 33.3 G/DL (ref 31–36)
MCV RBC AUTO: 78 FL (ref 80–100)
MICROCYTES BLD QL AUTO: PRESENT
MONOCYTES # BLD AUTO: 0.4 THOUSAND/ΜL (ref 0.2–0.9)
MONOCYTES NFR BLD AUTO: 11 % (ref 1–10)
NEUTROPHILS # BLD AUTO: 2.2 THOUSANDS/ΜL (ref 1.8–7.8)
NEUTS SEG NFR BLD AUTO: 53 % (ref 45–65)
PLATELET # BLD AUTO: 170 THOUSANDS/UL (ref 150–450)
PLATELET BLD QL SMEAR: ADEQUATE
PMV BLD AUTO: 9.5 FL (ref 8.9–12.7)
POTASSIUM SERPL-SCNC: 4.2 MMOL/L (ref 3.6–5)
RBC # BLD AUTO: 4.41 MILLION/UL (ref 4–5.2)
RBC MORPH BLD: NORMAL
SODIUM SERPL-SCNC: 136 MMOL/L (ref 137–147)
TSH SERPL DL<=0.05 MIU/L-ACNC: 2.19 UIU/ML (ref 0.47–4.68)
WBC # BLD AUTO: 4.2 THOUSAND/UL (ref 4.5–11)

## 2021-04-12 PROCEDURE — 80048 BASIC METABOLIC PNL TOTAL CA: CPT | Performed by: NURSE PRACTITIONER

## 2021-04-12 PROCEDURE — 73110 X-RAY EXAM OF WRIST: CPT

## 2021-04-12 PROCEDURE — 99232 SBSQ HOSP IP/OBS MODERATE 35: CPT | Performed by: INTERNAL MEDICINE

## 2021-04-12 PROCEDURE — 85025 COMPLETE CBC W/AUTO DIFF WBC: CPT | Performed by: NURSE PRACTITIONER

## 2021-04-12 PROCEDURE — 84443 ASSAY THYROID STIM HORMONE: CPT | Performed by: INTERNAL MEDICINE

## 2021-04-12 PROCEDURE — 73564 X-RAY EXAM KNEE 4 OR MORE: CPT

## 2021-04-12 PROCEDURE — 97530 THERAPEUTIC ACTIVITIES: CPT

## 2021-04-12 PROCEDURE — 99233 SBSQ HOSP IP/OBS HIGH 50: CPT | Performed by: PHYSICAL MEDICINE & REHABILITATION

## 2021-04-12 PROCEDURE — 97535 SELF CARE MNGMENT TRAINING: CPT

## 2021-04-12 PROCEDURE — 97110 THERAPEUTIC EXERCISES: CPT

## 2021-04-12 RX ADMIN — PANTOPRAZOLE SODIUM 20 MG: 20 TABLET, DELAYED RELEASE ORAL at 06:41

## 2021-04-12 RX ADMIN — LEVOTHYROXINE SODIUM 175 MCG: 0.15 TABLET ORAL at 06:41

## 2021-04-12 RX ADMIN — ENOXAPARIN SODIUM 40 MG: 40 INJECTION SUBCUTANEOUS at 08:34

## 2021-04-12 RX ADMIN — OXYCODONE HYDROCHLORIDE 5 MG: 5 TABLET ORAL at 21:11

## 2021-04-12 RX ADMIN — DOCUSATE SODIUM 100 MG: 100 CAPSULE ORAL at 17:33

## 2021-04-12 RX ADMIN — BACITRACIN 1 SMALL APPLICATION: 500 OINTMENT TOPICAL at 21:10

## 2021-04-12 RX ADMIN — DOCUSATE SODIUM 100 MG: 100 CAPSULE ORAL at 08:34

## 2021-04-12 RX ADMIN — OXYCODONE HYDROCHLORIDE 5 MG: 5 TABLET ORAL at 10:24

## 2021-04-12 NOTE — PROGRESS NOTES
[facility]  Progress Note Valentin Koroma 1965, 63 y o  female MRN: [id number]  Unit/Bed#: [facility] 445-46 Encounter: [id number]  Primary Care Provider: Ephraim Sotelo MD   Date and time admitted to hospital: 4/7/2021  4:05 PM    Anemia  Assessment & Plan  Hgb currently 11 5 from 12 1  Asymptomatic  Monitor for acute s/s of bleeding  Continue to trend with routine CBC  Angular cheilitis  Assessment & Plan  Noted over the past 2-3 days, not itchy or painful  Dries overnight and cracks when pt speaks or eats  Currently receiving bacitracin ointment  Pt has had in past and resolves with vaseline  Thrombocytopenia (HCC)  Assessment & Plan  Plt count currently 170 from 121 on 4/6  Asymptomatic  Currently on lovenox injections  No acute s/s of bleeding  Will continue to trend  Vitamin D deficiency  Assessment & Plan  Last Vitamin D level was 14 in 2019  Vitamin D level 12 4 on 4/8  Started on Vitamin D supplementation, 50,000u weekly  Continue for 6 weeks and follow-up with PCP  Dizziness  Assessment & Plan  Experienced orthostasis in the acute setting that resolved after IV fluids  Monitor BP with routine VS   Encourage fluid intake  Obtain orthostatic BPs  Obesity  Assessment & Plan  Nutritional counseling as needed  Gastric sleeve surgery 5 years ago  Recently started to gain weight, Endocrine ordered Contrave for patient, but she would prefer nutritional counseling  Has an appointment to follow-up with Weight Management  Left wrist distal radius nondisplaced fracture  Assessment & Plan  4/3 - R tibial plateau fracture and L nondisplaced wrist fracture s/p mechanical fall   + Head strike, LOC  CT head - no acute abnormalities  L wrist NWB, Sugar tong splint  Platform walker on L - allowed to bear weight on L elbow  Pain management  Follow-up with Ortho (Dr Zheng Hinojosa) in 1 week with repeat x-rays  (4/12)    PT/OT Therapies    Primary team following  GERD (gastroesophageal reflux disease)  Assessment & Plan  Takes Pepcid 20mg daily at home  Non-formulary  Continue with Protonix 20mg daily  Hypothyroidism  Assessment & Plan  Currently taking levothyroxine 175mcg  Dose decreased by Endocrinology in March  Last TSH was 0 14 and T4 was 1 57 on   Continue to follow-up with Endocrinology as outpatient  * Tibial plateau fracture  Assessment & Plan  4/3 - R tibial plateau fracture and L nondisplaced wrist fracture s/p mechanical fall   + Head strike, LOC  CT head - no acute abnormalities  Pain management  L wrist NWB, Sugar tong splint  RLE NWB, TROM brace locked in extension  Platform walker on L - allowed to bear weight on L elbow  Follow-up with Ortho (Dr Clare Butler) in 1 week with repeat x-rays  ()    PT/OT Therapies  Primary team following  VTE Pharmacologic Prophylaxis:   Pharmacologic: Enoxaparin (Lovenox)  Mechanical VTE Prophylaxis in Place: Yes - sequential compression devices  Current Length of Stay: 5 day(s)    Current Patient Status: Inpatient Rehab     Discharge Plan: As per primary team     Code Status: Level 1 - Full Code    Subjective:   Pt examined while sitting in WC in pt room  Complaints of pain 5/10 to both RLE and L wrist, aching/throbbing, just asked for oxycodone to help with the pain  States that she has been sleeping well at night  Therapy has still been going really well  Plans for x-rays later today  Objective:     Vitals:   Temp (24hrs), Av °F (36 7 °C), Min:97 5 °F (36 4 °C), Max:98 5 °F (36 9 °C)    Temp:  [97 5 °F (36 4 °C)-98 5 °F (36 9 °C)] 98 1 °F (36 7 °C)  HR:  [71-99] 71  Resp:  [18] 18  BP: (108-128)/(54-62) 120/57  SpO2:  [100 %] 100 %  Body mass index is 37 33 kg/m²  Review of Systems   Constitutional: Negative for chills, fatigue and fever  Respiratory: Negative for cough and shortness of breath      Cardiovascular: Negative for chest pain, palpitations and leg swelling  Gastrointestinal: Negative for abdominal distention, abdominal pain, constipation, diarrhea, nausea and vomiting  LBM 4/11   Genitourinary: Negative for difficulty urinating  Musculoskeletal: Positive for arthralgias (5/10 RLE and L wrist pain, aching/throbbing, improves with pain medication - just asked for oxycodone)  Neurological: Negative for dizziness and light-headedness  Psychiatric/Behavioral: Negative for sleep disturbance  Input and Output Summary (last 24 hours): Intake/Output Summary (Last 24 hours) at 4/12/2021 0920  Last data filed at 4/11/2021 2010  Gross per 24 hour   Intake 240 ml   Output 150 ml   Net 90 ml       Physical Exam:     Physical Exam  Vitals signs and nursing note reviewed  Constitutional:       Appearance: Normal appearance  She is obese  HENT:      Head: Normocephalic and atraumatic  Cardiovascular:      Rate and Rhythm: Normal rate and regular rhythm  Pulses: Normal pulses  Heart sounds: Normal heart sounds  No murmur  No friction rub  Pulmonary:      Effort: Pulmonary effort is normal  No respiratory distress  Breath sounds: Normal breath sounds  No wheezing or rhonchi  Abdominal:      General: Abdomen is flat  Bowel sounds are normal  There is no distension  Palpations: Abdomen is soft  Tenderness: There is no abdominal tenderness  Musculoskeletal:      Right lower leg: No edema  Left lower leg: No edema  Comments: RLE in knee immobilizer  LUE in wrist splint  Skin:     General: Skin is warm and dry  Neurological:      Mental Status: She is alert and oriented to person, place, and time     Psychiatric:         Mood and Affect: Mood normal          Behavior: Behavior normal          Additional Data:     Labs:    Results from last 7 days   Lab Units 04/12/21  0640   WBC Thousand/uL 4 20*   HEMOGLOBIN g/dL 11 5*   HEMATOCRIT % 34 4*   PLATELETS Thousands/uL 170   NEUTROS PCT % 53   LYMPHS PCT % 33 MONOS PCT % 11*   EOS PCT % 3     Results from last 7 days   Lab Units 04/12/21  0640   SODIUM mmol/L 136*   POTASSIUM mmol/L 4 2   CHLORIDE mmol/L 101   CO2 mmol/L 32*   BUN mg/dL 16   CREATININE mg/dL 0 60   ANION GAP mmol/L 3*   CALCIUM mg/dL 9 5   GLUCOSE RANDOM mg/dL 96                       Labs reviewed    Imaging:    Imaging reviewed    Recent Cultures (last 7 days):           Last 24 Hours Medication List:   Current Facility-Administered Medications   Medication Dose Route Frequency Provider Last Rate    acetaminophen  650 mg Oral Q6H PRN Link MD Ara      aluminum-magnesium hydroxide-simethicone  30 mL Oral Q6H PRN Link MD Ara      bacitracin  1 small application Topical HS Michaelle Herrera PA-C      bisacodyl  10 mg Rectal Daily PRN Adrien Bullock MD      calcium carbonate  1,000 mg Oral Daily PRN Adrien Bullock MD      cyclobenzaprine  5 mg Oral TID PRN Adrien Bullock MD      docusate sodium  100 mg Oral BID Adrien Bullock MD      enoxaparin  40 mg Subcutaneous Q24H [address] Link MD Ara      ergocalciferol  50,000 Units Oral Weekly Maralee Favors, CRNP      levothyroxine  175 mcg Oral Early Morning Adrien Bullock MD      ondansetron  4 mg Oral Q6H PRN Adrien Bullock MD      oxyCODONE  2 5 mg Oral Q4H PRN Adrien Bullock MD      oxyCODONE  5 mg Oral Q4H PRN Adrien Bullock MD      pantoprazole  20 mg Oral Early Morning Adrien Bullock MD      polyethylene glycol  17 g Oral Daily PRN Adrien Bullock MD      simethicone  80 mg Oral 4x Daily PRN Adrien Bullock MD          [employer] software was used to dictate this note  It may contain errors with dictating incorrect words or incorrect spelling  Please contact the provider directly with any questions

## 2021-04-12 NOTE — ASSESSMENT & PLAN NOTE
Plt count currently 170 from 121 on 4/6  Asymptomatic  Currently on lovenox injections  No acute s/s of bleeding  Will continue to trend

## 2021-04-12 NOTE — ASSESSMENT & PLAN NOTE
4/3 - R tibial plateau fracture and L nondisplaced wrist fracture s/p mechanical fall   + Head strike, LOC  CT head - no acute abnormalities  L wrist NWB, Sugar tong splint  Platform walker on L - allowed to bear weight on L elbow  Pain management  Follow-up with Ortho (Dr Royer Evangelista) in 1 week with repeat x-rays  (4/12)    PT/OT Therapies  Primary team following

## 2021-04-12 NOTE — ASSESSMENT & PLAN NOTE
Noted over the past 2-3 days, not itchy or painful  Dries overnight and cracks when pt speaks or eats  Currently receiving bacitracin ointment  Pt has had in past and resolves with vaseline

## 2021-04-12 NOTE — PLAN OF CARE
Problem: PAIN - ADULT  Goal: Verbalizes/displays adequate comfort level or baseline comfort level  Description: Interventions:  - Encourage patient to monitor pain and request assistance  - Assess pain using appropriate pain scale  - Administer analgesics based on type and severity of pain and evaluate response  - Implement non-pharmacological measures as appropriate and evaluate response  - Consider cultural and social influences on pain and pain management  - Notify physician/advanced practitioner if interventions unsuccessful or patient reports new pain  Outcome: Progressing     Problem: INFECTION - ADULT  Goal: Absence or prevention of progression during hospitalization  Description: INTERVENTIONS:  - Assess and monitor for signs and symptoms of infection  - Monitor lab/diagnostic results  - Monitor all insertion sites, i e  indwelling lines, tubes, and drains  - Monitor endotracheal if appropriate and nasal secretions for changes in amount and color  - [place] appropriate cooling/warming therapies per order  - Administer medications as ordered  - Instruct and encourage patient and family to use good hand hygiene technique  - Identify and instruct in appropriate isolation precautions for identified infection/condition  Outcome: Progressing     Problem: SAFETY ADULT  Goal: Patient will remain free of falls  Description: INTERVENTIONS:  - Assess patient frequently for physical needs  -  Identify cognitive and physical deficits and behaviors that affect risk of falls    -  [place] fall precautions as indicated by assessment   - Educate patient/family on patient safety including physical limitations  - Instruct patient to call for assistance with activity based on assessment  - Modify environment to reduce risk of injury  - Consider OT/PT consult to assist with strengthening/mobility  Outcome: Progressing  Goal: Maintain or return to baseline ADL function  Description: INTERVENTIONS:  -  Assess patient's ability to carry out ADLs; assess patient's baseline for ADL function and identify physical deficits which impact ability to perform ADLs (bathing, care of mouth/teeth, toileting, grooming, dressing, etc )  - Assess/evaluate cause of self-care deficits   - Assess range of motion  - Assess patient's mobility; develop plan if impaired  - Assess patient's need for assistive devices and provide as appropriate  - Encourage maximum independence but intervene and supervise when necessary  - Involve family in performance of ADLs  - Assess for home care needs following discharge   - Consider OT consult to assist with ADL evaluation and planning for discharge  - Provide patient education as appropriate  Outcome: Progressing  Goal: Maintain or return mobility status to optimal level  Description: INTERVENTIONS:  - Assess patient's baseline mobility status (ambulation, transfers, stairs, etc )    - Identify cognitive and physical deficits and behaviors that affect mobility  - Identify mobility aids required to assist with transfers and/or ambulation (gait belt, sit-to-stand, lift, walker, cane, etc )  - [place] fall precautions as indicated by assessment  - Record patient progress and toleration of activity level on Mobility SBAR; progress patient to next Phase/Stage  - Instruct patient to call for assistance with activity based on assessment  - Consider rehabilitation consult to assist with strengthening/weightbearing, etc   Outcome: Progressing     Problem: DISCHARGE PLANNING  Goal: Discharge to home or other facility with appropriate resources  Description: INTERVENTIONS:  - Identify barriers to discharge w/patient and caregiver  - Arrange for needed discharge resources and transportation as appropriate  - Identify discharge learning needs (meds, wound care, etc )  - Arrange for interpretive services to assist at discharge as needed  - Refer to Case Management Department for coordinating discharge planning if the patient needs post-hospital services based on physician/advanced practitioner order or complex needs related to functional status, cognitive ability, or social support system  Outcome: Progressing     Problem: Knowledge Deficit  Goal: Patient/family/caregiver demonstrates understanding of disease process, treatment plan, medications, and discharge instructions  Description: Complete learning assessment and assess knowledge base  Interventions:  - Provide teaching at level of understanding  - Provide teaching via preferred learning methods  Outcome: Progressing     Problem: Potential for Falls  Goal: Patient will remain free of falls  Description: INTERVENTIONS:  - Assess patient frequently for physical needs  -  Identify cognitive and physical deficits and behaviors that affect risk of falls    -  [place] fall precautions as indicated by assessment   - Educate patient/family on patient safety including physical limitations  - Instruct patient to call for assistance with activity based on assessment  - Modify environment to reduce risk of injury  - Consider OT/PT consult to assist with strengthening/mobility  Outcome: Progressing     Problem: Prexisting or High Potential for Compromised Skin Integrity  Goal: Skin integrity is maintained or improved  Description: INTERVENTIONS:  - Identify patients at risk for skin breakdown  - Assess and monitor skin integrity  - Assess and monitor nutrition and hydration status  - Monitor labs   - Assess for incontinence   - Turn and reposition patient  - Assist with mobility/ambulation  - Relieve pressure over bony prominences  - Avoid friction and shearing  - Provide appropriate hygiene as needed including keeping skin clean and dry  - Evaluate need for skin moisturizer/barrier cream  - Collaborate with interdisciplinary team   - Patient/family teaching  - Consider wound care consult   Outcome: Progressing     Problem: GASTROINTESTINAL - ADULT  Goal: Minimal or absence of nausea and/or vomiting  Description: INTERVENTIONS:  - Administer IV fluids if ordered to ensure adequate hydration  - Maintain NPO status until nausea and vomiting are resolved  - Nasogastric tube if ordered  - Administer ordered antiemetic medications as needed  - Provide nonpharmacologic comfort measures as appropriate  - Advance diet as tolerated, if ordered  - Consider nutrition services referral to assist patient with adequate nutrition and appropriate food choices  Outcome: Progressing  Goal: Maintains or returns to baseline bowel function  Description: INTERVENTIONS:  - Assess bowel function  - Encourage oral fluids to ensure adequate hydration  - Administer IV fluids if ordered to ensure adequate hydration  - Administer ordered medications as needed  - Encourage mobilization and activity  - Consider nutritional services referral to assist patient with adequate nutrition and appropriate food choices  Outcome: Progressing  Goal: Maintains adequate nutritional intake  Description: INTERVENTIONS:  - Monitor percentage of each meal consumed  - Identify factors contributing to decreased intake, treat as appropriate  - Assist with meals as needed  - Monitor I&O, weight, and lab values if indicated  - Obtain nutrition services referral as needed  Outcome: Progressing     Problem: GENITOURINARY - ADULT  Goal: Maintains or returns to baseline urinary function  Description: INTERVENTIONS:  - Assess urinary function  - Encourage oral fluids to ensure adequate hydration if ordered  - Administer IV fluids as ordered to ensure adequate hydration  - Administer ordered medications as needed  - Offer frequent toileting  - Follow urinary retention protocol if ordered  Outcome: Progressing     Problem: METABOLIC, FLUID AND ELECTROLYTES - ADULT  Goal: Electrolytes maintained within normal limits  Description: INTERVENTIONS:  - Monitor labs and assess patient for signs and symptoms of electrolyte imbalances  - Administer electrolyte replacement as ordered  - Monitor response to electrolyte replacements, including repeat lab results as appropriate  - Instruct patient on fluid and nutrition as appropriate  Outcome: Progressing  Goal: Fluid balance maintained  Description: INTERVENTIONS:  - Monitor labs   - Monitor I/O and WT  - Instruct patient on fluid and nutrition as appropriate  - Assess for signs & symptoms of volume excess or deficit  Outcome: Progressing     Problem: SKIN/TISSUE INTEGRITY - ADULT  Goal: Skin integrity remains intact  Description: INTERVENTIONS  - Identify patients at risk for skin breakdown  - Assess and monitor skin integrity  - Assess and monitor nutrition and hydration status  - Monitor labs (i e  albumin)  - Assess for incontinence   - Turn and reposition patient  - Assist with mobility/ambulation  - Relieve pressure over bony prominences  - Avoid friction and shearing  - Provide appropriate hygiene as needed including keeping skin clean and dry  - Evaluate need for skin moisturizer/barrier cream  - Collaborate with interdisciplinary team (i e  Nutrition, Rehabilitation, etc )   - Patient/family teaching  Outcome: Progressing     Problem: HEMATOLOGIC - ADULT  Goal: Maintains hematologic stability  Description: INTERVENTIONS  - Assess for signs and symptoms of bleeding or hemorrhage  - Monitor labs  - Administer supportive blood products/factors as ordered and appropriate  Outcome: Progressing     Problem: MUSCULOSKELETAL - ADULT  Goal: Maintain or return mobility to safest level of function  Description: INTERVENTIONS:  - Assess patient's ability to carry out ADLs; assess patient's baseline for ADL function and identify physical deficits which impact ability to perform ADLs (bathing, care of mouth/teeth, toileting, grooming, dressing, etc )  - Assess/evaluate cause of self-care deficits   - Assess range of motion  - Assess patient's mobility  - Assess patient's need for assistive devices and provide as appropriate  - Encourage maximum independence but intervene and supervise when necessary  - Involve family in performance of ADLs  - Assess for home care needs following discharge   - Consider OT consult to assist with ADL evaluation and planning for discharge  - Provide patient education as appropriate  Outcome: Progressing  Goal: Maintain proper alignment of affected body part  Description: INTERVENTIONS:  - Support, maintain and protect limb and body alignment  - Provide patient/ family with appropriate education  Outcome: Progressing     Problem: Prexisting or High Potential for Compromised Skin Integrity  Goal: Skin integrity is maintained or improved  Description: INTERVENTIONS:  - Identify patients at risk for skin breakdown  - Assess and monitor skin integrity  - Assess and monitor nutrition and hydration status  - Monitor labs   - Assess for incontinence   - Turn and reposition patient  - Assist with mobility/ambulation  - Relieve pressure over bony prominences  - Avoid friction and shearing  - Provide appropriate hygiene as needed including keeping skin clean and dry  - Evaluate need for skin moisturizer/barrier cream  - Collaborate with interdisciplinary team   - Patient/family teaching  - Consider wound care consult   Outcome: Progressing

## 2021-04-12 NOTE — PROGRESS NOTES
04/12/21 1230   Pain Assessment   Pain Assessment Tool 0-10   Pain Score 3   Pain Location/Orientation Orientation: Right;Location: Leg   Hospital Pain Intervention(s) Rest;Repositioned;Elevated   Restrictions/Precautions   Precautions Fall Risk   LUE Weight Bearing Per Order NWB  (can use platform RW)   RLE Weight Bearing Per Order NWB   Cognition   Overall Cognitive Status WFL   Subjective   Subjective no complaints, feels sheis making good progress, likes the leg    Roll Left and Right   Reason if not Attempted Safety concerns   Roll Left and Right CARE Score 88   Sit to Lying   Type of Assistance Needed Set-up / clean-up; Adaptive equipment   Comment using leg    Sit to Lying CARE Score 5   Lying to Sitting on Side of Bed   Type of Assistance Needed Set-up / clean-up; Adaptive equipment   Comment using leg    Lying to Sitting on Side of Bed CARE Score 5   Sit to Stand   Type of Assistance Needed Supervision; Adaptive equipment   Comment with PF RW   Sit to Stand CARE Score 4   Bed-Chair Transfer   Type of Assistance Needed Supervision; Adaptive equipment   Comment SPT with PF RW   Chair/Bed-to-Chair Transfer CARE Score 4   Car Transfer   Reason if not Attempted Environmental limitations   Car Transfer CARE Score 10   Wheel 50 Feet with Two Turns   Type of Assistance Needed Independent   Comment R UE and L LE   Wheel 50 Feet with Two Turns CARE Score 6   Wheel 150 Feet   Type of Assistance Needed Independent   Comment R UE and L LE   Wheel 150 Feet CARE Score 6   Wheelchair mobility   Does the patient use a wheelchair? 1  Yes   Type of Wheelchair Used 1  Manual   Method Right upper extremity; Left lower extremity   Distance Level Surface (feet) 200 ft  (x3)   Picking Up Object   Reason if not Attempted Safety concerns   Picking Up Object CARE Score 88   Toilet Transfer   Findings Discussed setup since dtr is having grab bars installed   Plan is for pt to use w/c to access bathroom on her own and pereform SPT with bras vs PF RW  Will need to practice more transfers w/o RW   Therapeutic Interventions   Strengthening AA SLR R LE 3 x 8 reps, AA ABD/ADD   Balance STS and reachoing for P bar to simulate using grab bar in bathroom vs RW   Other Discussed DME needs and submitted form to CM for w/c, and PF RW   Assessment   Treatment Assessment Therapy session focused on functional transfers  Pt using leg  throughout and asked to manage R LE leg rest herself  Pt safely performs with supervison multiple times throughout session ability to perform SPT, get R LE on/off leg rest and lock/unlock leg rest properly herself  Will benefit from continued practice of SPT using grab bars vs RW for toilet transfers at home  PT Barriers   Physical Impairment Decreased mobility; Decreased endurance;Decreased strength; Impaired balance;Orthopedic restrictions   Functional Limitation Walking;Transfers;Stair negotiation;Ramp negotiation;Car transfers   Recommendation   PT Discharge Recommendation Home with skilled therapy  (Home PT)   Equipment Recommended Tawana Urena; Wheelchair   PT Equipment ordered yes, w/c and PF RW   PT Therapy Minutes   PT Time In 1230   PT Time Out 1400   PT Total Time (minutes) 90   PT Mode of treatment - Individual (minutes) 90   PT Mode of treatment - Concurrent (minutes) 0   PT Mode of treatment - Group (minutes) 0   PT Mode of treatment - Co-treat (minutes) 0   PT Mode of Treatment - Total time(minutes) 90 minutes   PT Cumulative Minutes 420   Therapy Time missed   Time missed?  No

## 2021-04-12 NOTE — ASSESSMENT & PLAN NOTE
4/3 - R tibial plateau fracture and L nondisplaced wrist fracture s/p mechanical fall   + Head strike, LOC  CT head - no acute abnormalities  Pain management  L wrist NWB, Sugar tong splint  RLE NWB, TROM brace locked in extension  Platform walker on L - allowed to bear weight on L elbow  Follow-up with Ortho (Dr Yunier Booth) in 1 week with repeat x-rays  (4/12)    PT/OT Therapies  Primary team following

## 2021-04-12 NOTE — PROGRESS NOTES
PM&R PROGRESS NOTE:  Elzbieta Biggs 63 y o  female MRN: [id number]  Unit/Bed#: -01 Encounter: [id number]        Rehab Diagnosis: Impairment of mobility, safety and Activities of Daily Living (ADLs) due to Orthopedic Disorders:  08 9 Other Orthopedic Right tibial plateu fracture, left radius fracture    HPI: Elzbieta Biggs is a 63 y o  female with hypothyroidism, GERD, gastric sleeve, who presented to the [address] on 4/3/21 status post traumatic fall upon exiting her car  Sustained a right tibial plateau comminuted nondisplaced fracture, left least distal radius fracture  Evaluated by Orthopedics and treated nonsurgically  Deemed nonweightbearing right lower extremity as well as left wrist (allowed to use platform walker at left elbow)  Placed in a hinged knee brace locked in extension and a sugar-tong splint for the left upper extremity  Patient will require repeat imaging in 1 week  Medically, patient was found to have some orthostasis which was symptomatic and IV fluids  Patient with mild anemia and thrombocytopenia which was followed  Patient also treated for acute nociceptive pain  Patient found to have acute functional her baseline given her nonweightbearing status, and after clearance and stabilization was admitted to [facility] for acute inpatient rehabilitation  SUBJECTIVE:  Patient seen face to face today  No acute issues reported this weekend  Denies increased pain - adequately controlled on Oxycodone  ASSESSMENT: Stable, progressing      PLAN:    Rehabilitation   Functional deficits: right lower extremity weakness, left upper extremity weakness, decreased endurance, impaired mobility, self care    Continue current rehabilitation plan of care to maximize function   Functional update:  Doing well supervision - CGA with Platform RW with transfers and Min-Mod A for LB ADLs and bathing      Estimated Discharge: TBD      DVT prophylaxis  · Managed on subcutaneous Lovenox     Pain  · Managed on p r n  Tylenol, oxycodone IR 2 5 mg-5 mg every 4 hours p r n  for moderate to severe pain  · P r n  Flexeril available for muscular spasms  · Continue topical ice and elevation of limb     Bladder plan  · Continent     Bowel plan  · Continent   · Last BM 4/8/21  * Tibial plateau fracture  Assessment & Plan  · Sustained after traumatic fall on 4/3/21  · Treated non-operatively by Orthopedics  · Deemed NWB RLE placed in a hinged knee brace locked in extension  · Follow-up imaging in 1 week (around 4/12/21) recommended per Orthopedics - Order placed today  Informed Ortho AP on floor Guilherme Jean by tigertext  · Follow-up with Orthopedics as outpatient     Left wrist distal radius nondisplaced fracture  Assessment & Plan  · Sustained after traumatic fall on 4/3/21  · Treated non-operatively by Orthopedics  · Deemed NWB Left wrist - but allowed platform walker at left elbow  · Placed in a sugar-tong splint  · Follow-up imaging recommended in 1 week (around 4/12/21) - Order placed today  Informed Ortho AP on floor Guilherme Jean by tigertext   · Follow-up with Orthopedics as outpatient  Anemia  Assessment & Plan  · Mild  · Asymptomatic  · Will follow    History of sleeve gastrectomy  Assessment & Plan  · S/p laparascopic sleeve gastrectomy and,cholecystectomy - Dr Dedra Frederick @ [facility] on 12/9/15      Thrombocytopenia ([facility] [address] )  Assessment & Plan  · Trending up  · Continue to follow    Dizziness  Assessment & Plan  · Resolved  · Was related to mild orthostasis recovered with IV hydration  · In ARC, if needed will use TEDS and abdominal binder    GERD (gastroesophageal reflux disease)  Assessment & Plan  · Managed on Protonix (therapeutic exchange for Omeprazole)  · PRN tums and mylanta    Hypothyroidism  Assessment & Plan  · Managed on Synthroid     Appreciate IM consultants medical co-management  Labs, medications, and imaging personally reviewed        ROS:  A ten point review of systems was completed on 4/12/21 and pertinent positives are listed in subjective section  All other systems reviewed were negative  OBJECTIVE:   /57 (BP Location: Right arm)   Pulse 71   Temp 98 1 °F (36 7 °C) (Tympanic)   Resp 18   Ht 5' 5" (1 651 m)   Wt 102 kg (224 lb 4 8 oz) Comment: with leg immobilizer  LMP 10/24/2016   SpO2 100%   BMI 37 33 kg/m²       Physical Exam  Vitals signs and nursing note reviewed  Constitutional:       General: She is not in acute distress  HENT:      Head: Normocephalic and atraumatic  Nose: Nose normal       Mouth/Throat:      Mouth: Mucous membranes are moist    Eyes:      Conjunctiva/sclera: Conjunctivae normal    Neck:      Musculoskeletal: Neck supple  Cardiovascular:      Rate and Rhythm: Normal rate and regular rhythm  Pulses: Normal pulses  Pulmonary:      Effort: Pulmonary effort is normal       Breath sounds: Normal breath sounds  No wheezing or rales  Abdominal:      General: Bowel sounds are normal  There is no distension  Palpations: Abdomen is soft  Tenderness: There is no abdominal tenderness  Musculoskeletal:         General: No swelling  Comments: Left splint in place  Right hinged knee brace locked in place   Skin:     General: Skin is warm  Neurological:      Mental Status: She is alert and oriented to person, place, and time        Comments: R ankle DF/PF 4/5  Left fingers: 4/5    Psychiatric:         Mood and Affect: Mood normal           Lab Results   Component Value Date    WBC 4 20 (L) 04/12/2021    HGB 11 5 (L) 04/12/2021    HCT 34 4 (L) 04/12/2021    MCV 78 (L) 04/12/2021     04/12/2021     Lab Results   Component Value Date    SODIUM 136 (L) 04/12/2021    K 4 2 04/12/2021     04/12/2021    CO2 32 (H) 04/12/2021    BUN 16 04/12/2021    CREATININE 0 60 04/12/2021    GLUC 96 04/12/2021    CALCIUM 9 5 04/12/2021     No results found for: INR, PROTIME        Current Facility-Administered Medications:   acetaminophen (TYLENOL) tablet 650 mg, 650 mg, Oral, Q6H PRN, Tracy Williamson MD, 650 mg at 04/09/21 0822    aluminum-magnesium hydroxide-simethicone (MYLANTA) oral suspension 30 mL, 30 mL, Oral, Q6H PRN, Tracy Williamson MD    bacitracin topical ointment 1 small application, 1 small application, Topical, HS, Michaelle Herrera PA-C, 1 small application at 91/57/17 2113    bisacodyl (DULCOLAX) rectal suppository 10 mg, 10 mg, Rectal, Daily PRN, Tracy Williamson MD    calcium carbonate (TUMS) chewable tablet 1,000 mg, 1,000 mg, Oral, Daily PRN, Tracy Williamson MD    cyclobenzaprine (FLEXERIL) tablet 5 mg, 5 mg, Oral, TID PRN, Tracy Williamson MD    docusate sodium (COLACE) capsule 100 mg, 100 mg, Oral, BID, Tracy Williamson MD, 100 mg at 04/12/21 0834    enoxaparin (LOVENOX) subcutaneous injection 40 mg, 40 mg, Subcutaneous, Q24H [address], Tracy Williamson MD, 40 mg at 04/12/21 0834    ergocalciferol (VITAMIN D2) capsule 50,000 Units, 50,000 Units, Oral, Weekly, ELEUTERIO Molina, 50,000 Units at 04/09/21 0827    levothyroxine tablet 175 mcg, 175 mcg, Oral, Early Morning, Tracy Williamson MD, 175 mcg at 04/12/21 0641    ondansetron (ZOFRAN-ODT) dispersible tablet 4 mg, 4 mg, Oral, Q6H PRN, Tracy Williamson MD    oxyCODONE (ROXICODONE) IR tablet 2 5 mg, 2 5 mg, Oral, Q4H PRN, Tracy Williamson MD, 2 5 mg at 04/10/21 0850    oxyCODONE (ROXICODONE) IR tablet 5 mg, 5 mg, Oral, Q4H PRN, Tracy Williamson MD, 5 mg at 04/12/21 1024    pantoprazole (PROTONIX) EC tablet 20 mg, 20 mg, Oral, Early Morning, Tracy Williamson MD, 20 mg at 04/12/21 0641    polyethylene glycol (MIRALAX) packet 17 g, 17 g, Oral, Daily PRN, Tracy Williamson MD    simethicone (MYLICON) chewable tablet 80 mg, 80 mg, Oral, 4x Daily PRN, Tracy Williamson MD    Past Medical History:   Diagnosis Date    Disease of thyroid gland        Patient Active Problem List    Diagnosis Date Noted    Tibial plateau fracture 30/31/5576     Priority: High    Left wrist distal radius nondisplaced fracture 04/03/2021     Priority: Medium    Angular cheilitis 04/08/2021    History of sleeve gastrectomy 04/08/2021    Anemia 04/08/2021    Vitamin D deficiency 04/07/2021    Thrombocytopenia ([address] ) 04/07/2021    Dizziness 04/05/2021    Ambulatory dysfunction 04/03/2021    Hypothyroidism 04/03/2021    GERD (gastroesophageal reflux disease) 04/03/2021    Obesity 04/03/2021          Evita Gallardo MD    Total time spent:  30 minutes with more than 50% spent counseling/coordinating care  Counseling includes discussion with patient re: progress and discussion with patient of his/her current medical/functional state/information  Coordination of patient's care was performed in conjunction with consulting services  Time invested included review of patient's labs, vitals, and management of their comorbidities with continued monitoring  The care of the patient was extensively discussed and appropriate treatment plan was formulated unique for this patient  ** Please Note:  voice to text software may have been used in the creation of this document   Although proof errors in transcription or interpretation are a potential of such software**

## 2021-04-12 NOTE — PHYSICAL THERAPY NOTE
Pt's Dtr, Lizy Barrera in to visit  Observed her performing transfers with her mother and she demonstrates ability to safely assist and cue as needed  Permission given that she may assist when visiting  RN made aware and board updated  Also discussed potential d/c plan of home the end of the week based on functional progress  Will need to confirm with MD  Pt and dtr in agreement for discharge home   in then to discuss DME further and continued care needs  Home PT services recommended

## 2021-04-12 NOTE — ASSESSMENT & PLAN NOTE
Hgb currently 11 5 from 12 1  Asymptomatic  Monitor for acute s/s of bleeding  Continue to trend with routine CBC

## 2021-04-12 NOTE — PLAN OF CARE
Problem: PAIN - ADULT  Goal: Verbalizes/displays adequate comfort level or baseline comfort level  Description: Interventions:  - Encourage patient to monitor pain and request assistance  - Assess pain using appropriate pain scale  - Administer analgesics based on type and severity of pain and evaluate response  - Implement non-pharmacological measures as appropriate and evaluate response  - Consider cultural and social influences on pain and pain management  - Notify physician/advanced practitioner if interventions unsuccessful or patient reports new pain  Outcome: Progressing     Problem: SAFETY ADULT  Goal: Patient will remain free of falls  Description: INTERVENTIONS:  - Assess patient frequently for physical needs  -  Identify cognitive and physical deficits and behaviors that affect risk of falls    -  Dulac fall precautions as indicated by assessment   - Educate patient/family on patient safety including physical limitations  - Instruct patient to call for assistance with activity based on assessment  - Modify environment to reduce risk of injury  - Consider OT/PT consult to assist with strengthening/mobility  Outcome: Progressing  Goal: Maintain or return to baseline ADL function  Description: INTERVENTIONS:  -  Assess patient's ability to carry out ADLs; assess patient's baseline for ADL function and identify physical deficits which impact ability to perform ADLs (bathing, care of mouth/teeth, toileting, grooming, dressing, etc )  - Assess/evaluate cause of self-care deficits   - Assess range of motion  - Assess patient's mobility; develop plan if impaired  - Assess patient's need for assistive devices and provide as appropriate  - Encourage maximum independence but intervene and supervise when necessary  - Involve family in performance of ADLs  - Assess for home care needs following discharge   - Consider OT consult to assist with ADL evaluation and planning for discharge  - Provide patient education as appropriate  Outcome: Progressing  Goal: Maintain or return mobility status to optimal level  Description: INTERVENTIONS:  - Assess patient's baseline mobility status (ambulation, transfers, stairs, etc )    - Identify cognitive and physical deficits and behaviors that affect mobility  - Identify mobility aids required to assist with transfers and/or ambulation (gait belt, sit-to-stand, lift, walker, cane, etc )  - Iselin fall precautions as indicated by assessment  - Record patient progress and toleration of activity level on Mobility SBAR; progress patient to next Phase/Stage  - Instruct patient to call for assistance with activity based on assessment  - Consider rehabilitation consult to assist with strengthening/weightbearing, etc   Outcome: Progressing     Problem: Knowledge Deficit  Goal: Patient/family/caregiver demonstrates understanding of disease process, treatment plan, medications, and discharge instructions  Description: Complete learning assessment and assess knowledge base  Interventions:  - Provide teaching at level of understanding  - Provide teaching via preferred learning methods  Outcome: Progressing     Problem: Potential for Falls  Goal: Patient will remain free of falls  Description: INTERVENTIONS:  - Assess patient frequently for physical needs  -  Identify cognitive and physical deficits and behaviors that affect risk of falls    -  Iselin fall precautions as indicated by assessment   - Educate patient/family on patient safety including physical limitations  - Instruct patient to call for assistance with activity based on assessment  - Modify environment to reduce risk of injury  - Consider OT/PT consult to assist with strengthening/mobility  Outcome: Progressing     Problem: Prexisting or High Potential for Compromised Skin Integrity  Goal: Skin integrity is maintained or improved  Description: INTERVENTIONS:  - Identify patients at risk for skin breakdown  - Assess and monitor skin integrity  - Assess and monitor nutrition and hydration status  - Monitor labs   - Assess for incontinence   - Turn and reposition patient  - Assist with mobility/ambulation  - Relieve pressure over bony prominences  - Avoid friction and shearing  - Provide appropriate hygiene as needed including keeping skin clean and dry  - Evaluate need for skin moisturizer/barrier cream  - Collaborate with interdisciplinary team   - Patient/family teaching  - Consider wound care consult   Outcome: Progressing

## 2021-04-12 NOTE — PROGRESS NOTES
04/12/21 0900   Pain Assessment   Pain Assessment Tool 0-10   Pain Score 6   Pain Location/Orientation Orientation: Right;Location: Leg   Pain Onset/Description Onset: Gradual   Effect of Pain on Daily Activities Tolerated   Patient's Stated Pain Goal No pain   Hospital Pain Intervention(s)   (RN notified for pain meds)   Multiple Pain Sites No   Pain 2   Pain Score 2 4   Pain Location/Orientation 2 Orientation: Left; Location: Arm   Hospital Pain Intervention(s) 2   (notified RN for pain meds)   Restrictions/Precautions   Precautions Fall Risk;Pain   Weight Bearing Restrictions Yes   LUE Weight Bearing Per Order NWB   RLE Weight Bearing Per Order NWB   Braces or Orthoses LE Immobilizer   Shower/Bathe Self   Type of Assistance Needed Physical assistance; Adaptive equipment   Amount of Physical Assistance Provided Less than 25%   Comment Pt seated at sink to perform SB routine, bathing all parts with use of LHAE  Pt seated to bathe UB and BLE's, utilizing LH reacher with wash cloth to bathe B/L feet  In stance at counter top maintaining WB restrictions to bathe arley/buttock, Nuria for steadying and increased safety  Pt plans to SB upon D/C home to daughter's  Shower/Bathe Self CARE Score 3   Tub/Shower Transfer   Reason Not Assessed Sponge Bath;Medical   Upper Body Dressing   Type of Assistance Needed Physical assistance   Amount of Physical Assistance Provided Less than 25%   Comment Seated to initiate donning bra, req A to clasp bra  Pt able to don loose fitting [place] shirt with S/U assist    Upper Body Dressing CARE Score 3   Lower Body Dressing   Type of Assistance Needed Physical assistance; Adaptive equipment   Amount of Physical Assistance Provided 25%-49%   Comment Seated utilizing [address] reacehr to thread BLE's into underwear/pants  In stance at sink top to perform CM up, req A for steadying to fully manage underwear/pants up above buttock     Lower Body Dressing CARE Score 3   Putting On/Taking Off Footwear   Type of Assistance Needed Physical assistance; Adaptive equipment   Amount of Physical Assistance Provided Less than 25%   Comment Utilized sock aide to don B/L socks, req A with donning personal sock onto sock aide  Pt able to slide LLE into shoe  Elastic laces utilized at this time  Putting On/Taking Off Footwear CARE Score 3   Sit to Lying   Type of Assistance Needed Supervision; Adaptive equipment   Amount of Physical Assistance Provided No physical assistance   Comment CS with use of leg ; HOB flat without use of bed rail   Sit to Lying CARE Score 4   Lying to Sitting on Side of Bed   Type of Assistance Needed Incidental touching; Adaptive equipment   Amount of Physical Assistance Provided No physical assistance   Comment CG with use of leg ; HOB flat without use of bed rail   Lying to Sitting on Side of Bed CARE Score 4   Sit to Stand   Type of Assistance Needed Incidental touching; Adaptive equipment   Amount of Physical Assistance Provided No physical assistance   Comment CG with PF RW   Sit to Stand CARE Score 4   Bed-Chair Transfer   Type of Assistance Needed Incidental touching; Adaptive equipment   Amount of Physical Assistance Provided No physical assistance   Comment CG SPT with PF RW   Chair/Bed-to-Chair Transfer CARE Score 4   Cognition   Overall Cognitive Status WFL   Arousal/Participation Alert; Cooperative   Attention Within functional limits   Orientation Level Oriented X4   Memory Within functional limits   Following Commands Follows all commands and directions without difficulty   Additional Activities   Additional Activities Other (Comment)   Additional Activities Comments Pt engaged in w/c management, demonstrating G ability to manage RLE leg rest on/off while maintaining all WB restrictions  Pt utilizes leg  to A with managing RLE on/off of R leg rest  Pt able to complete with DS st this time     Activity Tolerance   Activity Tolerance Patient tolerated treatment well   Assessment Treatment Assessment Pt participated in 90 min skilled OT Txs ession with focus on ADL performance, w/c management, and bed mobility  See above for further Tx details  Pt is currently performing SB seated at sink, req overall Nuria for steadying while in stance  Pt utilized LH reacher to throroughly bathe B/L feet while seated  Pt is receptive to utilizing [address] sponge for D/C home  Cotninues to req Nuria for UB dressing 2* to difficulty managing bra clasp, Nuria for LB dressing with use of LHAE  Pt demo G carryover with managing RLE elevating leg rest, donning and doffing without difficulty  Pt benefits from utilizing leg  to assist with managing RLE on/off of leg rest  BAIN discussed home s/u for D/C  Pt plan to D/C to daughter's with s/u in basement level with half bath  Pt reports she will not be compelting kitchen mobiltiy or light meal prep, however, verbalized that daughter plans to purchase small fridge/cooler to keep with pt in basement level  Pt communicated that her daughter inquired about pt's current bed mobility, asking if pt will need bed rails upon D/C  Pt completed sup <> EOB at CS/CG level utilizing leg  for RLE management and with HOB flat and no use of bed rails  OT anticipates that pt will not need bed rails upon D/C  Communicated daughter's concern to PT (Ed)  Pt would continue to benefit from skilled OT services to improve functional standing balance for improved ADL performance, increase I with ADL transfer's, and overall increase I w/ ADL performance in order to progress towards OT goals and dec caregiver burden upon D/C home  Prognosis Good   Problem List Decreased strength;Decreased range of motion;Decreased endurance; Impaired balance;Decreased mobility;Orthopedic restrictions;Pain   Barriers to Discharge Decreased caregiver support   Plan   Treatment/Interventions ADL retraining;Functional transfer training; Therapeutic exercise; Endurance training;Patient/family training Progress Progressing toward goals   Recommendation   OT Discharge Recommendation Home with skilled therapy   Equipment Recommended Sock aid ($);Reacher ($)  (leg )   OT Equipment ordered   (Pt reports daughter to purchase LHAE )   OT Therapy Minutes   OT Time In 0900   OT Time Out 1030   OT Total Time (minutes) 90   OT Mode of treatment - Individual (minutes) 90   OT Mode of treatment - Concurrent (minutes) 0   OT Mode of treatment - Group (minutes) 0   OT Mode of treatment - Co-treat (minutes) 0   OT Mode of Treatment - Total time(minutes) 90 minutes   OT Cumulative Minutes 480   Therapy Time missed   Time missed?  No

## 2021-04-13 PROBLEM — Z74.09 IMPAIRED MOBILITY AND ACTIVITIES OF DAILY LIVING: Status: ACTIVE | Noted: 2021-04-13

## 2021-04-13 PROBLEM — Z78.9 IMPAIRED MOBILITY AND ACTIVITIES OF DAILY LIVING: Status: ACTIVE | Noted: 2021-04-13

## 2021-04-13 PROCEDURE — 99232 SBSQ HOSP IP/OBS MODERATE 35: CPT | Performed by: INTERNAL MEDICINE

## 2021-04-13 PROCEDURE — 97110 THERAPEUTIC EXERCISES: CPT

## 2021-04-13 PROCEDURE — 97530 THERAPEUTIC ACTIVITIES: CPT

## 2021-04-13 PROCEDURE — 97535 SELF CARE MNGMENT TRAINING: CPT

## 2021-04-13 PROCEDURE — 99232 SBSQ HOSP IP/OBS MODERATE 35: CPT | Performed by: PHYSICAL MEDICINE & REHABILITATION

## 2021-04-13 RX ADMIN — OXYCODONE HYDROCHLORIDE 5 MG: 5 TABLET ORAL at 20:08

## 2021-04-13 RX ADMIN — PANTOPRAZOLE SODIUM 20 MG: 20 TABLET, DELAYED RELEASE ORAL at 06:07

## 2021-04-13 RX ADMIN — LEVOTHYROXINE SODIUM 175 MCG: 0.15 TABLET ORAL at 06:07

## 2021-04-13 RX ADMIN — DOCUSATE SODIUM 100 MG: 100 CAPSULE ORAL at 17:41

## 2021-04-13 RX ADMIN — OXYCODONE HYDROCHLORIDE 2.5 MG: 5 TABLET ORAL at 15:21

## 2021-04-13 RX ADMIN — ENOXAPARIN SODIUM 40 MG: 40 INJECTION SUBCUTANEOUS at 09:00

## 2021-04-13 RX ADMIN — OXYCODONE HYDROCHLORIDE 5 MG: 5 TABLET ORAL at 06:10

## 2021-04-13 RX ADMIN — DOCUSATE SODIUM 100 MG: 100 CAPSULE ORAL at 09:00

## 2021-04-13 NOTE — PLAN OF CARE
Problem: PAIN - ADULT  Goal: Verbalizes/displays adequate comfort level or baseline comfort level  Description: Interventions:  - Encourage patient to monitor pain and request assistance  - Assess pain using appropriate pain scale  - Administer analgesics based on type and severity of pain and evaluate response  - Implement non-pharmacological measures as appropriate and evaluate response  - Consider cultural and social influences on pain and pain management  - Notify physician/advanced practitioner if interventions unsuccessful or patient reports new pain  Outcome: Progressing     Problem: INFECTION - ADULT  Goal: Absence or prevention of progression during hospitalization  Description: INTERVENTIONS:  - Assess and monitor for signs and symptoms of infection  - Monitor lab/diagnostic results  - Monitor all insertion sites, i e  indwelling lines, tubes, and drains  - Monitor endotracheal if appropriate and nasal secretions for changes in amount and color  - [place] appropriate cooling/warming therapies per order  - Administer medications as ordered  - Instruct and encourage patient and family to use good hand hygiene technique  - Identify and instruct in appropriate isolation precautions for identified infection/condition  Outcome: Progressing     Problem: SAFETY ADULT  Goal: Patient will remain free of falls  Description: INTERVENTIONS:  - Assess patient frequently for physical needs  -  Identify cognitive and physical deficits and behaviors that affect risk of falls    -  [place] fall precautions as indicated by assessment   - Educate patient/family on patient safety including physical limitations  - Instruct patient to call for assistance with activity based on assessment  - Modify environment to reduce risk of injury  - Consider OT/PT consult to assist with strengthening/mobility  Outcome: Progressing  Goal: Maintain or return to baseline ADL function  Description: INTERVENTIONS:  -  Assess patient's ability to carry out ADLs; assess patient's baseline for ADL function and identify physical deficits which impact ability to perform ADLs (bathing, care of mouth/teeth, toileting, grooming, dressing, etc )  - Assess/evaluate cause of self-care deficits   - Assess range of motion  - Assess patient's mobility; develop plan if impaired  - Assess patient's need for assistive devices and provide as appropriate  - Encourage maximum independence but intervene and supervise when necessary  - Involve family in performance of ADLs  - Assess for home care needs following discharge   - Consider OT consult to assist with ADL evaluation and planning for discharge  - Provide patient education as appropriate  Outcome: Progressing  Goal: Maintain or return mobility status to optimal level  Description: INTERVENTIONS:  - Assess patient's baseline mobility status (ambulation, transfers, stairs, etc )    - Identify cognitive and physical deficits and behaviors that affect mobility  - Identify mobility aids required to assist with transfers and/or ambulation (gait belt, sit-to-stand, lift, walker, cane, etc )  - [place] fall precautions as indicated by assessment  - Record patient progress and toleration of activity level on Mobility SBAR; progress patient to next Phase/Stage  - Instruct patient to call for assistance with activity based on assessment  - Consider rehabilitation consult to assist with strengthening/weightbearing, etc   Outcome: Progressing     Problem: DISCHARGE PLANNING  Goal: Discharge to home or other facility with appropriate resources  Description: INTERVENTIONS:  - Identify barriers to discharge w/patient and caregiver  - Arrange for needed discharge resources and transportation as appropriate  - Identify discharge learning needs (meds, wound care, etc )  - Arrange for interpretive services to assist at discharge as needed  - Refer to Case Management Department for coordinating discharge planning if the patient needs post-hospital services based on physician/advanced practitioner order or complex needs related to functional status, cognitive ability, or social support system  Outcome: Progressing     Problem: Knowledge Deficit  Goal: Patient/family/caregiver demonstrates understanding of disease process, treatment plan, medications, and discharge instructions  Description: Complete learning assessment and assess knowledge base  Interventions:  - Provide teaching at level of understanding  - Provide teaching via preferred learning methods  Outcome: Progressing     Problem: Potential for Falls  Goal: Patient will remain free of falls  Description: INTERVENTIONS:  - Assess patient frequently for physical needs  -  Identify cognitive and physical deficits and behaviors that affect risk of falls    -  [place] fall precautions as indicated by assessment   - Educate patient/family on patient safety including physical limitations  - Instruct patient to call for assistance with activity based on assessment  - Modify environment to reduce risk of injury  - Consider OT/PT consult to assist with strengthening/mobility  Outcome: Progressing     Problem: Prexisting or High Potential for Compromised Skin Integrity  Goal: Skin integrity is maintained or improved  Description: INTERVENTIONS:  - Identify patients at risk for skin breakdown  - Assess and monitor skin integrity  - Assess and monitor nutrition and hydration status  - Monitor labs   - Assess for incontinence   - Turn and reposition patient  - Assist with mobility/ambulation  - Relieve pressure over bony prominences  - Avoid friction and shearing  - Provide appropriate hygiene as needed including keeping skin clean and dry  - Evaluate need for skin moisturizer/barrier cream  - Collaborate with interdisciplinary team   - Patient/family teaching  - Consider wound care consult   Outcome: Progressing     Problem: GASTROINTESTINAL - ADULT  Goal: Minimal or absence of nausea and/or vomiting  Description: INTERVENTIONS:  - Administer IV fluids if ordered to ensure adequate hydration  - Maintain NPO status until nausea and vomiting are resolved  - Nasogastric tube if ordered  - Administer ordered antiemetic medications as needed  - Provide nonpharmacologic comfort measures as appropriate  - Advance diet as tolerated, if ordered  - Consider nutrition services referral to assist patient with adequate nutrition and appropriate food choices  Outcome: Progressing  Goal: Maintains or returns to baseline bowel function  Description: INTERVENTIONS:  - Assess bowel function  - Encourage oral fluids to ensure adequate hydration  - Administer IV fluids if ordered to ensure adequate hydration  - Administer ordered medications as needed  - Encourage mobilization and activity  - Consider nutritional services referral to assist patient with adequate nutrition and appropriate food choices  Outcome: Progressing  Goal: Maintains adequate nutritional intake  Description: INTERVENTIONS:  - Monitor percentage of each meal consumed  - Identify factors contributing to decreased intake, treat as appropriate  - Assist with meals as needed  - Monitor I&O, weight, and lab values if indicated  - Obtain nutrition services referral as needed  Outcome: Progressing     Problem: GENITOURINARY - ADULT  Goal: Maintains or returns to baseline urinary function  Description: INTERVENTIONS:  - Assess urinary function  - Encourage oral fluids to ensure adequate hydration if ordered  - Administer IV fluids as ordered to ensure adequate hydration  - Administer ordered medications as needed  - Offer frequent toileting  - Follow urinary retention protocol if ordered  Outcome: Progressing     Problem: METABOLIC, FLUID AND ELECTROLYTES - ADULT  Goal: Electrolytes maintained within normal limits  Description: INTERVENTIONS:  - Monitor labs and assess patient for signs and symptoms of electrolyte imbalances  - Administer electrolyte replacement as ordered  - Monitor response to electrolyte replacements, including repeat lab results as appropriate  - Instruct patient on fluid and nutrition as appropriate  Outcome: Progressing  Goal: Fluid balance maintained  Description: INTERVENTIONS:  - Monitor labs   - Monitor I/O and WT  - Instruct patient on fluid and nutrition as appropriate  - Assess for signs & symptoms of volume excess or deficit  Outcome: Progressing     Problem: SKIN/TISSUE INTEGRITY - ADULT  Goal: Skin integrity remains intact  Description: INTERVENTIONS  - Identify patients at risk for skin breakdown  - Assess and monitor skin integrity  - Assess and monitor nutrition and hydration status  - Monitor labs (i e  albumin)  - Assess for incontinence   - Turn and reposition patient  - Assist with mobility/ambulation  - Relieve pressure over bony prominences  - Avoid friction and shearing  - Provide appropriate hygiene as needed including keeping skin clean and dry  - Evaluate need for skin moisturizer/barrier cream  - Collaborate with interdisciplinary team (i e  Nutrition, Rehabilitation, etc )   - Patient/family teaching  Outcome: Progressing     Problem: HEMATOLOGIC - ADULT  Goal: Maintains hematologic stability  Description: INTERVENTIONS  - Assess for signs and symptoms of bleeding or hemorrhage  - Monitor labs  - Administer supportive blood products/factors as ordered and appropriate  Outcome: Progressing     Problem: MUSCULOSKELETAL - ADULT  Goal: Maintain or return mobility to safest level of function  Description: INTERVENTIONS:  - Assess patient's ability to carry out ADLs; assess patient's baseline for ADL function and identify physical deficits which impact ability to perform ADLs (bathing, care of mouth/teeth, toileting, grooming, dressing, etc )  - Assess/evaluate cause of self-care deficits   - Assess range of motion  - Assess patient's mobility  - Assess patient's need for assistive devices and provide as appropriate  - Encourage maximum independence but intervene and supervise when necessary  - Involve family in performance of ADLs  - Assess for home care needs following discharge   - Consider OT consult to assist with ADL evaluation and planning for discharge  - Provide patient education as appropriate  Outcome: Progressing  Goal: Maintain proper alignment of affected body part  Description: INTERVENTIONS:  - Support, maintain and protect limb and body alignment  - Provide patient/ family with appropriate education  Outcome: Progressing     Problem: Prexisting or High Potential for Compromised Skin Integrity  Goal: Skin integrity is maintained or improved  Description: INTERVENTIONS:  - Identify patients at risk for skin breakdown  - Assess and monitor skin integrity  - Assess and monitor nutrition and hydration status  - Monitor labs   - Assess for incontinence   - Turn and reposition patient  - Assist with mobility/ambulation  - Relieve pressure over bony prominences  - Avoid friction and shearing  - Provide appropriate hygiene as needed including keeping skin clean and dry  - Evaluate need for skin moisturizer/barrier cream  - Collaborate with interdisciplinary team   - Patient/family teaching  - Consider wound care consult   Outcome: Progressing

## 2021-04-13 NOTE — ASSESSMENT & PLAN NOTE
Currently taking levothyroxine 175mcg  Dose decreased by Endocrinology in March  Last TSH was 0 14 and T4 was 1 57 on 1/28  Continue to follow-up with Endocrinology as outpatient  TSH 2 190 on 4/13  Continue current levothyroxine dose

## 2021-04-13 NOTE — CASE MANAGEMENT
Met with the patient and her daughter, Jacques Archer  [employer] paperwork provided by the patient to be filled out by physician  Discussed discharge planning including potential DC dates, equipment needed and home health services  Patient's daughter stated that she would like to be informed about cost of equipment once it is processed through DME company to determine if she will acquire any independently   She also requested that home health services be provided by [facility]

## 2021-04-13 NOTE — PROGRESS NOTES
PM&R PROGRESS NOTE:  Marc Graham 63 y o  female MRN: [id number]  Unit/Bed#: -01 Encounter: [id number]        Rehab Diagnosis: Impairment of mobility, safety and Activities of Daily Living (ADLs) due to Orthopedic Disorders:  08 9 Other Orthopedic Right tibial plateu fracture, left radius fracture    HPI: Marc Graham is a 63 y o  female with hypothyroidism, GERD, gastric sleeve, who presented to the [address] [facility] [address] on 4/3/21 status post traumatic fall upon exiting her car  Sustained a right tibial plateau comminuted nondisplaced fracture, left least distal radius fracture  Evaluated by Orthopedics and treated nonsurgically  Deemed nonweightbearing right lower extremity as well as left wrist (allowed to use platform walker at left elbow)  Placed in a hinged knee brace locked in extension and a sugar-tong splint for the left upper extremity  Patient will require repeat imaging in 1 week  Medically, patient was found to have some orthostasis which was symptomatic and IV fluids  Patient with mild anemia and thrombocytopenia which was followed  Patient also treated for acute nociceptive pain  Patient found to have acute functional her baseline given her nonweightbearing status, and after clearance and stabilization was admitted to [facility] for acute inpatient rehabilitation  SUBJECTIVE:  Patient seen face to face  No acute issues overnight  Feeling well without complaints  Completed her [employer] paperwork for both jobs  ASSESSMENT: Stable, progressing      PLAN:    Rehabilitation   Functional deficits: right lower extremity weakness, left upper extremity weakness, decreased endurance, impaired mobility, self care    Continue current rehabilitation plan of care to maximize function   Functional update:  Doing well supervision - CGA with Platform RW with transfers and Min-Mod A for LB ADLs and bathing      Estimated Discharge: TBD      DVT prophylaxis  · Managed on subcutaneous Lovenox     Pain  · Managed on p r n  Tylenol, oxycodone IR 2 5 mg-5 mg every 4 hours p r n  for moderate to severe pain  · P r n  Flexeril available for muscular spasms  · Continue topical ice and elevation of limb     Bladder plan  · Continent     Bowel plan  · Continent    * Tibial plateau fracture  Assessment & Plan  · Sustained after traumatic fall on 4/3/21  · Treated non-operatively by Orthopedics  · Deemed NWB RLE placed in a hinged knee brace locked in extension  · Follow-up x-ray completed 4/12/21 - Orthopedics to review   · Follow-up with Orthopedics as outpatient     Left wrist distal radius nondisplaced fracture  Assessment & Plan  · Sustained after traumatic fall on 4/3/21  · Treated non-operatively by Orthopedics  · Deemed NWB Left wrist - but allowed platform walker at left elbow  · Placed in a sugar-tong splint  · Follow-up x-ray comleted 4/12/21 - Orthopedics to review   · Follow-up with Orthopedics as outpatient  Anemia  Assessment & Plan  · Mild  · Asymptomatic  · Will follow    History of sleeve gastrectomy  Assessment & Plan  · S/p laparascopic sleeve gastrectomy and,cholecystectomy - Dr Mer Pulido @ [facility] on 12/9/15      Thrombocytopenia ([address] )  Assessment & Plan  · Trending up  · Continue to follow    Dizziness  Assessment & Plan  · Resolved  · Was related to mild orthostasis recovered with IV hydration  · In ARC, if needed will use TEDS and abdominal binder    GERD (gastroesophageal reflux disease)  Assessment & Plan  · Managed on Protonix (therapeutic exchange for Omeprazole)  · PRN tums and mylanta    Hypothyroidism  Assessment & Plan  · Managed on Synthroid     Appreciate IM consultants medical co-management  Labs, medications, and imaging personally reviewed  ROS:  A ten point review of systems was completed on 4/13/21 and pertinent positives are listed in subjective section  All other systems reviewed were negative       OBJECTIVE:   BP 95/54 (BP Location: Right arm)   Pulse 80   Temp 97 6 °F (36 4 °C) (Tympanic)   Resp 18   Ht 5' 5" (1 651 m)   Wt 102 kg (224 lb 4 8 oz) Comment: with leg immobilizer  LMP 10/24/2016   SpO2 100%   BMI 37 33 kg/m²       Physical Exam  Vitals signs and nursing note reviewed  Constitutional:       General: She is not in acute distress  HENT:      Head: Normocephalic and atraumatic  Nose: Nose normal       Mouth/Throat:      Mouth: Mucous membranes are moist    Eyes:      Conjunctiva/sclera: Conjunctivae normal    Neck:      Musculoskeletal: Neck supple  Cardiovascular:      Rate and Rhythm: Normal rate and regular rhythm  Pulses: Normal pulses  Pulmonary:      Effort: Pulmonary effort is normal       Breath sounds: Normal breath sounds  No wheezing or rales  Abdominal:      General: Bowel sounds are normal  There is no distension  Palpations: Abdomen is soft  Tenderness: There is no abdominal tenderness  Musculoskeletal:         General: Tenderness present  No swelling  Comments: Left splint in place  Right hinged knee brace locked in place   Skin:     General: Skin is warm  Neurological:      Mental Status: She is alert and oriented to person, place, and time        Comments: R ankle DF/PF 4/5  Left fingers: 4/5    Psychiatric:         Mood and Affect: Mood normal           Lab Results   Component Value Date    WBC 4 20 (L) 04/12/2021    HGB 11 5 (L) 04/12/2021    HCT 34 4 (L) 04/12/2021    MCV 78 (L) 04/12/2021     04/12/2021     Lab Results   Component Value Date    SODIUM 136 (L) 04/12/2021    K 4 2 04/12/2021     04/12/2021    CO2 32 (H) 04/12/2021    BUN 16 04/12/2021    CREATININE 0 60 04/12/2021    GLUC 96 04/12/2021    CALCIUM 9 5 04/12/2021     No results found for: INR, PROTIME        Current Facility-Administered Medications:     acetaminophen (TYLENOL) tablet 650 mg, 650 mg, Oral, Q6H PRN, Uriel Alston MD, 650 mg at 04/09/21 0822    aluminum-magnesium hydroxide-simethicone (MYLANTA) oral suspension 30 mL, 30 mL, Oral, Q6H PRN, Ronny Tabares MD    bisacodyl (DULCOLAX) rectal suppository 10 mg, 10 mg, Rectal, Daily PRN, Ronny Tabares MD    calcium carbonate (TUMS) chewable tablet 1,000 mg, 1,000 mg, Oral, Daily PRN, Ronny Tabares MD    cyclobenzaprine (FLEXERIL) tablet 5 mg, 5 mg, Oral, TID PRN, Ronny Tabares MD    docusate sodium (COLACE) capsule 100 mg, 100 mg, Oral, BID, Ronny Tabares MD, 100 mg at 04/13/21 0900    enoxaparin (LOVENOX) subcutaneous injection 40 mg, 40 mg, Subcutaneous, Q24H DELROY, Ronny Tabares MD, 40 mg at 04/13/21 0900    ergocalciferol (VITAMIN D2) capsule 50,000 Units, 50,000 Units, Oral, Weekly, ELEUTERIO Ellison, 50,000 Units at 04/09/21 0827    levothyroxine tablet 175 mcg, 175 mcg, Oral, Early Morning, Ronny Tabares MD, 175 mcg at 04/13/21 0607    ondansetron (ZOFRAN-ODT) dispersible tablet 4 mg, 4 mg, Oral, Q6H PRN, Ronny Tabares MD    oxyCODONE (ROXICODONE) IR tablet 2 5 mg, 2 5 mg, Oral, Q4H PRN, Ronny Tabares MD, 2 5 mg at 04/10/21 0850    oxyCODONE (ROXICODONE) IR tablet 5 mg, 5 mg, Oral, Q4H PRN, Ronny Tabares MD, 5 mg at 04/13/21 0610    pantoprazole (PROTONIX) EC tablet 20 mg, 20 mg, Oral, Early Morning, Ronny Tabares MD, 20 mg at 04/13/21 0607    polyethylene glycol (MIRALAX) packet 17 g, 17 g, Oral, Daily PRN, Ronny Tabares MD    simethicone (MYLICON) chewable tablet 80 mg, 80 mg, Oral, 4x Daily PRN, Ronny Tabares MD    Past Medical History:   Diagnosis Date    Disease of thyroid gland        Patient Active Problem List    Diagnosis Date Noted    Tibial plateau fracture 33/91/7187     Priority: High    Left wrist distal radius nondisplaced fracture 04/03/2021     Priority: Medium    Impaired mobility and activities of daily living 04/13/2021    Angular cheilitis 04/08/2021    History of sleeve gastrectomy 04/08/2021    Anemia 04/08/2021    Vitamin D deficiency 04/07/2021    Thrombocytopenia (HCC) 04/07/2021    Dizziness 04/05/2021    Ambulatory dysfunction 04/03/2021    Hypothyroidism 04/03/2021    GERD (gastroesophageal reflux disease) 04/03/2021    Obesity 04/03/2021          Sb Ríos MD    Total time spent:  30 minutes with more than 50% spent counseling/coordinating care  Counseling includes discussion with patient re: progress and discussion with patient of his/her current medical/functional state/information  Coordination of patient's care was performed in conjunction with consulting services  Time invested included review of patient's labs, vitals, and management of their comorbidities with continued monitoring  The care of the patient was extensively discussed and appropriate treatment plan was formulated unique for this patient  ** Please Note:  voice to text software may have been used in the creation of this document   Although proof errors in transcription or interpretation are a potential of such software**

## 2021-04-13 NOTE — CASE MANAGEMENT
Called Linda Live at [facility] at [phone] ext  [phone] to provide clinical update  There was no answer, VM left with call back information to provide update  For awhile now, since the winter, I have piercing numbness to my chin.  I saw ashley  who dx with TMJ, saw PMD who gave me an antifungal cream and was dissmissive.

## 2021-04-13 NOTE — ASSESSMENT & PLAN NOTE
Call placed to patient regarding what type of work she does.    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  Her incision sites she states are healed and has no bleeding.    Robotic hyst done on 9-26-17    Post op scheduled for 10-26-17    Please call Ernestine back if Dr Umanzor would release her to start work on 10-23-17. She will need a letter stating she can return as well.    Takes Pepcid 20mg daily at home  Non-formulary  Continue with Protonix 20mg daily

## 2021-04-13 NOTE — ASSESSMENT & PLAN NOTE
4/3 - R tibial plateau fracture and L nondisplaced wrist fracture s/p mechanical fall   + Head strike, LOC  CT head - no acute abnormalities  L wrist NWB, Sugar tong splint  Platform walker on L - allowed to bear weight on L elbow  Pain management  Follow-up with Ortho (Dr Vandana Cole) in 1 week with repeat x-rays  (4/12)    X-ray on 4/12: Nondisplaced distal radius fracture is not well visualized  Alignment is near anatomic  PT/OT Therapies  Primary team following

## 2021-04-13 NOTE — PROGRESS NOTES
04/13/21 1030   Pain Assessment   Pain Assessment Tool 0-10   Pain Score 3   Pain Location/Orientation Orientation: Left; Other (Comment)  (wrist)   Hospital Pain Intervention(s) Elevated; Rest   Pain 2   Pain Score 2 2   Pain Location/Orientation 2 Orientation: Right;Location: Knee   Hospital Pain Intervention(s) 2 Repositioned;Elevated   Restrictions/Precautions   Precautions Fall Risk   LUE Weight Bearing Per Order NWB   RLE Weight Bearing Per Order NWB   Braces or Orthoses LE Immobilizer   Subjective   Subjective reports wrist and knee still sore form x-rays yesterday   Roll Left and Right   Reason if not Attempted Safety concerns   Roll Left and Right CARE Score 88   Sit to Lying   Type of Assistance Needed Independent; Adaptive equipment   Amount of Physical Assistance Provided No physical assistance   Comment using leg    Sit to Lying CARE Score 6   Lying to Sitting on Side of Bed   Type of Assistance Needed Independent; Adaptive equipment   Amount of Physical Assistance Provided No physical assistance   Comment using leg    Lying to Sitting on Side of Bed CARE Score 6   Sit to Stand   Type of Assistance Needed Set-up / clean-up   Amount of Physical Assistance Provided No physical assistance   Sit to Stand CARE Score 5   Bed-Chair Transfer   Type of Assistance Needed Set-up / clean-up   Comment PF RW, maintains NWB R LE very well   Chair/Bed-to-Chair Transfer CARE Score 5   Car Transfer   Comment dtr will be in on Thurs 4/15   Reason if not Attempted Environmental limitations   Car Transfer CARE Score 10   Wheel 50 Feet with Two Turns   Type of Assistance Needed Independent   Wheel 50 Feet with Two Turns CARE Score 6   Wheel 150 Feet   Type of Assistance Needed Independent   Wheel 150 Feet CARE Score 6   Wheelchair mobility   Does the patient use a wheelchair? 1  Yes   Type of Wheelchair Used 1   Manual   Distance Level Surface (feet) 250 ft   Findings able to manage brakes and leg rst herself , using leg  to manage R LE    Picking Up Object   Reason if not Attempted Safety concerns   Picking Up Object CARE Score 88   Therapeutic Interventions   Strengthening Supine SLR, Hip ABD/ADD performed with Min A 4 x 10 reps each   Other Practiced transfers Supine<> sit & STS all x 5, SPT with PF RW to various surfaces from w/c having pt manage w/c on her own  Assessment   Treatment Assessment Continues to demo good safety with all transfers, following wt bearing restrictions  Strength R LE improving , but still requires use of leg  fo transfers  Awaiting ortho foloow up for plan and dtr coming in [address] [place] [address] for car transfer training  Recommendation   PT Discharge Recommendation Home with home health rehabilitation   PT Therapy Minutes   PT Time In 1030   PT Time Out 1130   PT Total Time (minutes) 60   PT Mode of treatment - Individual (minutes) 60   PT Mode of treatment - Concurrent (minutes) 0   PT Mode of treatment - Group (minutes) 0   PT Mode of treatment - Co-treat (minutes) 0   PT Mode of Treatment - Total time(minutes) 60 minutes   PT Cumulative Minutes 480   Therapy Time missed   Time missed?  No

## 2021-04-13 NOTE — ASSESSMENT & PLAN NOTE
Noted on 4/8, not itchy or painful  Currently receiving bacitracin ointment  Pt has had in past and resolves with vaseline  Improved  Bacitracin discontinued

## 2021-04-13 NOTE — ASSESSMENT & PLAN NOTE
4/3 - R tibial plateau fracture and L nondisplaced wrist fracture s/p mechanical fall   + Head strike, LOC  CT head - no acute abnormalities  Pain management  L wrist NWB, Sugar tong splint  RLE NWB, TROM brace locked in extension  Platform walker on L - allowed to bear weight on L elbow  Follow-up with Ortho (Dr Lilliana Schulte) in 1 week with repeat x-rays  (4/12)    X-ray on 4/12 - "Nondisplaced fracture of the proximal tibial metaphysis with intra-articular extension along the lateral tibial eminence, unchanged "    PT/OT Therapies  Primary team following

## 2021-04-13 NOTE — ASSESSMENT & PLAN NOTE
Experienced orthostasis in the acute setting that resolved after IV fluids  Monitor BP with routine VS   Encourage fluid intake  Orthostatic BPs obtained  Currently asymptomatic

## 2021-04-13 NOTE — PROGRESS NOTES
04/13/21 1230   Pain Assessment   Pain Assessment Tool 0-10   Pain Score 2   Pain Location/Orientation Orientation: Right;Location: Knee   Restrictions/Precautions   LUE Weight Bearing Per Order NWB   RLE Weight Bearing Per Order NWB   Braces or Orthoses LE Immobilizer   Wheel 50 Feet with Two Turns   Type of Assistance Needed Independent   Wheel 50 Feet with Two Turns CARE Score 6   Wheel 150 Feet   Type of Assistance Needed Independent   Wheel 150 Feet CARE Score 6   Wheelchair mobility   Does the patient use a wheelchair? 1  Yes   Type of Wheelchair Used 1  Manual   Therapeutic Interventions   Strengthening LAQ L LE x 40 w/ 5 sec hold, seatd hip flex x 40   Flexibility passive stretch R calf x 3 min   Equipment Use   NuStep 10 min lvl 3   Assessment   Treatment Assessment Pt seen for 30 min session, having pt propell w/c throughout halls to front lobby and out to sidewalk  Required Assist for control on ramps and with lip of curb cutout  Overall demo good safety with w/c mobility, allowing extar space for her leg that is extended on leg rest    PT Therapy Minutes   PT Time In 1230   PT Time Out 1300   PT Total Time (minutes) 30   PT Mode of treatment - Individual (minutes) 30   PT Mode of treatment - Concurrent (minutes) 0   PT Mode of treatment - Group (minutes) 0   PT Mode of treatment - Co-treat (minutes) 0   PT Mode of Treatment - Total time(minutes) 30 minutes   PT Cumulative Minutes 510   Therapy Time missed   Time missed?  No

## 2021-04-13 NOTE — PROGRESS NOTES
[facility]  Progress Note Joy Jacome 1965, 63 y o  female MRN: [id number]  Unit/Bed#: [facility] 454-15 Encounter: [id number]  Primary Care Provider: Dorothy Urbina MD   Date and time admitted to hospital: 4/7/2021  4:05 PM    Anemia  Assessment & Plan  Hgb currently 11 5 from 12 1  Asymptomatic  Monitor for acute s/s of bleeding  Continue to trend with routine CBC  Angular cheilitis  Assessment & Plan  Noted on 4/8, not itchy or painful  Currently receiving bacitracin ointment  Pt has had in past and resolves with vaseline  Improved  Bacitracin discontinued  Thrombocytopenia (HCC)  Assessment & Plan  Plt count currently 170 from 121 on 4/6  Asymptomatic  Currently on lovenox injections  No acute s/s of bleeding  Will continue to trend  Vitamin D deficiency  Assessment & Plan  Last Vitamin D level was 14 in 2019  Vitamin D level 12 4 on 4/8  Started on Vitamin D supplementation, 50,000u weekly  Continue for 6 weeks and follow-up with PCP  Dizziness  Assessment & Plan  Experienced orthostasis in the acute setting that resolved after IV fluids  Monitor BP with routine VS   Encourage fluid intake  Orthostatic BPs obtained  Currently asymptomatic  Obesity  Assessment & Plan  Nutritional counseling as needed  Gastric sleeve surgery 5 years ago  Recently started to gain weight, Endocrine ordered Contrave for patient, but she would prefer nutritional counseling  Has an appointment to follow-up with Weight Management  Left wrist distal radius nondisplaced fracture  Assessment & Plan  4/3 - R tibial plateau fracture and L nondisplaced wrist fracture s/p mechanical fall   + Head strike, LOC  CT head - no acute abnormalities  L wrist NWB, Sugar tong splint  Platform walker on L - allowed to bear weight on L elbow  Pain management    Follow-up with Ortho (Dr Nayana Leon) in 1 week with repeat x-rays  (4/12)    X-ray on 4/12: Nondisplaced distal radius fracture is not well visualized  Alignment is near anatomic  PT/OT Therapies  Primary team following  GERD (gastroesophageal reflux disease)  Assessment & Plan  Takes Pepcid 20mg daily at home  Non-formulary  Continue with Protonix 20mg daily  Hypothyroidism  Assessment & Plan  Currently taking levothyroxine 175mcg  Dose decreased by Endocrinology in March  Last TSH was 0 14 and T4 was 1 57 on 1/28  Continue to follow-up with Endocrinology as outpatient  TSH 2 190 on 4/13  Continue current levothyroxine dose  * Tibial plateau fracture  Assessment & Plan  4/3 - R tibial plateau fracture and L nondisplaced wrist fracture s/p mechanical fall   + Head strike, LOC  CT head - no acute abnormalities  Pain management  L wrist NWB, Sugar tong splint  RLE NWB, TROM brace locked in extension  Platform walker on L - allowed to bear weight on L elbow  Follow-up with Ortho (Dr Jonathan Aly) in 1 week with repeat x-rays  (4/12)    X-ray on 4/12 - "Nondisplaced fracture of the proximal tibial metaphysis with intra-articular extension along the lateral tibial eminence, unchanged "    PT/OT Therapies  Primary team following  VTE Pharmacologic Prophylaxis:   Pharmacologic: Enoxaparin (Lovenox)  Mechanical VTE Prophylaxis in Place: Yes - sequential compression devices  Current Length of Stay: 6 day(s)    Current Patient Status: Inpatient Rehab     Discharge Plan: As per primary team     Code Status: Level 1 - Full Code    Subjective:   Pt examined while sitting in recliner in pt room  States that currently she has no pain in her RLE but does have complaints of L wrist pain 4/10, aching/throbbing, improving with pain medication  States that yesterday her pain was worsening and reached 10/10 in her RLE, sharp/stabbing  States that this occurred after having her x-rays performed  X-ray results are updated and showed no acute changes  May need to consider repeat if pain returns    Slept well last night   Had a BM yesterday afternoon with no issues  Therapy has been going well  Objective:     Vitals:   Temp (24hrs), Av 5 °F (36 4 °C), Min:96 7 °F (35 9 °C), Max:98 2 °F (36 8 °C)    Temp:  [96 7 °F (35 9 °C)-98 2 °F (36 8 °C)] 97 6 °F (36 4 °C)  HR:  [69-87] 80  Resp:  [18] 18  BP: ()/(54-65) 95/54  SpO2:  [100 %] 100 %  Body mass index is 37 33 kg/m²  Review of Systems   Constitutional: Negative for chills, fatigue and fever  Respiratory: Negative for cough and shortness of breath  Cardiovascular: Negative for chest pain, palpitations and leg swelling  Gastrointestinal: Negative for abdominal distention, abdominal pain, constipation, diarrhea, nausea and vomiting  LBM    Genitourinary: Negative for difficulty urinating  Musculoskeletal: Positive for arthralgias (Denies RLE pain currently  LUE pain 4/10, aching/throbbing, improves with pain medication  )  Neurological: Negative for dizziness and light-headedness  Psychiatric/Behavioral: Negative for sleep disturbance  Input and Output Summary (last 24 hours): Intake/Output Summary (Last 24 hours) at 2021 1114  Last data filed at 2021 0941  Gross per 24 hour   Intake 360 ml   Output --   Net 360 ml       Physical Exam:     Physical Exam  Constitutional:       Appearance: Normal appearance  She is obese  HENT:      Head: Normocephalic and atraumatic  Cardiovascular:      Rate and Rhythm: Normal rate and regular rhythm  Pulses: Normal pulses  Heart sounds: Normal heart sounds  No murmur  No friction rub  Pulmonary:      Effort: Pulmonary effort is normal  No respiratory distress  Breath sounds: Normal breath sounds  No wheezing or rhonchi  Abdominal:      General: Abdomen is flat  Bowel sounds are normal  There is no distension  Palpations: Abdomen is soft  Tenderness: There is no abdominal tenderness  Musculoskeletal:      Right lower leg: No edema        Left lower leg: No edema  Comments: LUE in splint  RLE in immobilizer  Skin:     General: Skin is warm and dry  Neurological:      Mental Status: She is alert and oriented to person, place, and time     Psychiatric:         Mood and Affect: Mood normal          Behavior: Behavior normal          Additional Data:     Labs:    Results from last 7 days   Lab Units 04/12/21  0640   WBC Thousand/uL 4 20*   HEMOGLOBIN g/dL 11 5*   HEMATOCRIT % 34 4*   PLATELETS Thousands/uL 170   NEUTROS PCT % 53   LYMPHS PCT % 33   MONOS PCT % 11*   EOS PCT % 3     Results from last 7 days   Lab Units 04/12/21  0640   SODIUM mmol/L 136*   POTASSIUM mmol/L 4 2   CHLORIDE mmol/L 101   CO2 mmol/L 32*   BUN mg/dL 16   CREATININE mg/dL 0 60   ANION GAP mmol/L 3*   CALCIUM mg/dL 9 5   GLUCOSE RANDOM mg/dL 96                       Labs reviewed    Imaging:    Imaging reviewed    Recent Cultures (last 7 days):           Last 24 Hours Medication List:   Current Facility-Administered Medications   Medication Dose Route Frequency Provider Last Rate    acetaminophen  650 mg Oral Q6H PRN Montse Patterson MD      aluminum-magnesium hydroxide-simethicone  30 mL Oral Q6H PRN Montse Patterson MD      bisacodyl  10 mg Rectal Daily PRN Montse Patterson MD      calcium carbonate  1,000 mg Oral Daily PRN Montse Patterson MD      cyclobenzaprine  5 mg Oral TID PRN Montse Patterson MD      docusate sodium  100 mg Oral BID Montse Patterson MD      enoxaparin  40 mg Subcutaneous Q24H [address] Montse Patterson MD      ergocalciferol  50,000 Units Oral Weekly ELEUTERIO Cabrales      levothyroxine  175 mcg Oral Early Morning Montse Patterson MD      ondansetron  4 mg Oral Q6H PRN Montse Patterson MD      oxyCODONE  2 5 mg Oral Q4H PRN Montse Patterson MD      oxyCODONE  5 mg Oral Q4H PRN Montse Patterson MD      pantoprazole  20 mg Oral Early Morning Montse Patterson MD      polyethylene glycol  17 g Oral Daily PRN Montse Patterson MD      simethicone  80 mg Oral 4x Daily PRN Nadeen Liriano MD          M*Modal software was used to dictate this note  It may contain errors with dictating incorrect words or incorrect spelling  Please contact the provider directly with any questions

## 2021-04-14 PROCEDURE — 99232 SBSQ HOSP IP/OBS MODERATE 35: CPT | Performed by: PHYSICAL MEDICINE & REHABILITATION

## 2021-04-14 PROCEDURE — 99231 SBSQ HOSP IP/OBS SF/LOW 25: CPT | Performed by: PHYSICIAN ASSISTANT

## 2021-04-14 PROCEDURE — 97535 SELF CARE MNGMENT TRAINING: CPT

## 2021-04-14 PROCEDURE — 97542 WHEELCHAIR MNGMENT TRAINING: CPT

## 2021-04-14 PROCEDURE — 97110 THERAPEUTIC EXERCISES: CPT

## 2021-04-14 PROCEDURE — 97530 THERAPEUTIC ACTIVITIES: CPT

## 2021-04-14 PROCEDURE — 99232 SBSQ HOSP IP/OBS MODERATE 35: CPT | Performed by: INTERNAL MEDICINE

## 2021-04-14 RX ADMIN — PANTOPRAZOLE SODIUM 20 MG: 20 TABLET, DELAYED RELEASE ORAL at 06:02

## 2021-04-14 RX ADMIN — OXYCODONE HYDROCHLORIDE 5 MG: 5 TABLET ORAL at 09:25

## 2021-04-14 RX ADMIN — LEVOTHYROXINE SODIUM 175 MCG: 0.15 TABLET ORAL at 06:02

## 2021-04-14 RX ADMIN — DOCUSATE SODIUM 100 MG: 100 CAPSULE ORAL at 08:20

## 2021-04-14 RX ADMIN — DOCUSATE SODIUM 100 MG: 100 CAPSULE ORAL at 17:55

## 2021-04-14 RX ADMIN — ENOXAPARIN SODIUM 40 MG: 40 INJECTION SUBCUTANEOUS at 08:20

## 2021-04-14 NOTE — PROGRESS NOTES
PM&R PROGRESS NOTE:  Qamar Hurt 63 y o  female MRN: [id number]  Unit/Bed#: -01 Encounter: [id number]        Rehab Diagnosis: Impairment of mobility, safety and Activities of Daily Living (ADLs) due to Orthopedic Disorders:  08 9 Other Orthopedic Right tibial plateu fracture, left radius fracture    HPI: Qamar Hurt is a 63 y o  female with hypothyroidism, GERD, gastric sleeve, who presented to the [address] on 4/3/21 status post traumatic fall upon exiting her car  Sustained a right tibial plateau comminuted nondisplaced fracture, left least distal radius fracture  Evaluated by Orthopedics and treated nonsurgically  Deemed nonweightbearing right lower extremity as well as left wrist (allowed to use platform walker at left elbow)  Placed in a hinged knee brace locked in extension and a sugar-tong splint for the left upper extremity  Patient will require repeat imaging in 1 week  Medically, patient was found to have some orthostasis which was symptomatic and IV fluids  Patient with mild anemia and thrombocytopenia which was followed  Patient also treated for acute nociceptive pain  Patient found to have acute functional her baseline given her nonweightbearing status, and after clearance and stabilization was admitted to [facility] for acute inpatient rehabilitation  SUBJECTIVE:  Patient seen face to face  Patient continues to have wrist pain likely from the splint being loose - Orthopedics was notified and will be seeing patient today  No other acute issues  If no barriers discharge was reviewed with patient and could occur as early as Saturday  ASSESSMENT: Stable, progressing      PLAN:    Rehabilitation   Functional deficits: right lower extremity weakness, left upper extremity weakness, decreased endurance, impaired mobility, self care    Continue current rehabilitation plan of care to maximize function       Functional update: Progressing in transfers to supervision needing set-up  Family training tomorrow  ADLS are Min A   Estimated Discharge: Probable D/C this Saturday if stable from Orthopedics      DVT prophylaxis  · Managed on subcutaneous Lovenox - Education needs to be started for home     Pain  · Managed on p r n  Tylenol, oxycodone IR 2 5 mg-5 mg every 4 hours p r n  for moderate to severe pain  · P r n  Flexeril available for muscular spasms  · Continue topical ice and elevation of limb     Bladder plan  · Continent     Bowel plan  · Continent    * Tibial plateau fracture  Assessment & Plan  · Sustained after traumatic fall on 4/3/21  · Treated non-operatively by Orthopedics  · Deemed NWB RLE placed in a hinged knee brace locked in extension  · Follow-up x-ray completed 4/12/21 - Orthopedics to review   · Follow-up with Orthopedics as outpatient     Left wrist distal radius nondisplaced fracture  Assessment & Plan  · Sustained after traumatic fall on 4/3/21  · Treated non-operatively by Orthopedics  · Deemed NWB Left wrist - but allowed platform walker at left elbow  · Placed in a sugar-tong splint  · Follow-up x-ray comleted 4/12/21 - Orthopedics to review  · Splint also causing some discomfort patient thinks it is becoming loose - Orthopedics was contacted to evaluate and to come today  · Follow-up with Orthopedics as outpatient        Anemia  Assessment & Plan  · Mild  · Asymptomatic  · Will follow    History of sleeve gastrectomy  Assessment & Plan  · S/p laparascopic sleeve gastrectomy and,cholecystectomy - Dr Eleonora Barajas @ [facility] on 12/9/15      Thrombocytopenia ([address] )  Assessment & Plan  · Trending up  · Continue to follow    Dizziness  Assessment & Plan  · Resolved  · Was related to mild orthostasis recovered with IV hydration  · In ARC, if needed will use TEDS and abdominal binder    GERD (gastroesophageal reflux disease)  Assessment & Plan  · Managed on Protonix (therapeutic exchange for Omeprazole)  · PRN tums and mylanta    Hypothyroidism  Assessment & Plan  · Managed on Synthroid     Appreciate IM consultants medical co-management  Labs, medications, and imaging personally reviewed  ROS:  A ten point review of systems was completed on 4/14/21 and pertinent positives are listed in subjective section  All other systems reviewed were negative  OBJECTIVE:   /59 (BP Location: Right arm)   Pulse 72   Temp 97 8 °F (36 6 °C) (Tympanic)   Resp 16   Ht 5' 5" (1 651 m)   Wt 102 kg (224 lb 4 8 oz) Comment: with leg immobilizer  LMP 10/24/2016   SpO2 100%   BMI 37 33 kg/m²       Physical Exam  Vitals signs and nursing note reviewed  Constitutional:       General: She is not in acute distress  HENT:      Head: Normocephalic and atraumatic  Nose: Nose normal       Mouth/Throat:      Mouth: Mucous membranes are moist    Eyes:      Conjunctiva/sclera: Conjunctivae normal    Neck:      Musculoskeletal: Neck supple  Cardiovascular:      Rate and Rhythm: Normal rate and regular rhythm  Pulses: Normal pulses  Pulmonary:      Effort: Pulmonary effort is normal       Breath sounds: Normal breath sounds  No wheezing or rales  Abdominal:      General: Bowel sounds are normal  There is no distension  Palpations: Abdomen is soft  Tenderness: There is no abdominal tenderness  Musculoskeletal:         General: No swelling  Comments: LUE splinted - finger movement and sensation intact  RLE hinged knee brace in place - swelling improved   Skin:     General: Skin is warm  Neurological:      Mental Status: She is alert and oriented to person, place, and time        Comments: Intact R DF, PF, EHL   Psychiatric:         Mood and Affect: Mood normal           Lab Results   Component Value Date    WBC 4 20 (L) 04/12/2021    HGB 11 5 (L) 04/12/2021    HCT 34 4 (L) 04/12/2021    MCV 78 (L) 04/12/2021     04/12/2021     Lab Results   Component Value Date    SODIUM 136 (L) 04/12/2021    K 4 2 04/12/2021     04/12/2021    CO2 32 (H) 04/12/2021    BUN 16 04/12/2021    CREATININE 0 60 04/12/2021    GLUC 96 04/12/2021    CALCIUM 9 5 04/12/2021     No results found for: INR, PROTIME        Current Facility-Administered Medications:     acetaminophen (TYLENOL) tablet 650 mg, 650 mg, Oral, Q6H PRN, Duy Fulton MD, 650 mg at 04/09/21 4803    aluminum-magnesium hydroxide-simethicone (MYLANTA) oral suspension 30 mL, 30 mL, Oral, Q6H PRN, Duy Fulton MD    bisacodyl (DULCOLAX) rectal suppository 10 mg, 10 mg, Rectal, Daily PRN, Duy Fulton MD    calcium carbonate (TUMS) chewable tablet 1,000 mg, 1,000 mg, Oral, Daily PRN, Duy Fulton MD    cyclobenzaprine (FLEXERIL) tablet 5 mg, 5 mg, Oral, TID PRN, Duy Fulton MD    docusate sodium (COLACE) capsule 100 mg, 100 mg, Oral, BID, Duy Fulton MD, 100 mg at 04/14/21 0820    enoxaparin (LOVENOX) subcutaneous injection 40 mg, 40 mg, Subcutaneous, Q24H [address], Duy Fulton MD, 40 mg at 04/14/21 0820    ergocalciferol (VITAMIN D2) capsule 50,000 Units, 50,000 Units, Oral, Weekly, ELEUTERIO Jones, 50,000 Units at 04/09/21 0827    levothyroxine tablet 175 mcg, 175 mcg, Oral, Early Morning, Duy Fulton MD, 175 mcg at 04/14/21 0602    ondansetron (ZOFRAN-ODT) dispersible tablet 4 mg, 4 mg, Oral, Q6H PRN, Duy Fulton MD    oxyCODONE (ROXICODONE) IR tablet 2 5 mg, 2 5 mg, Oral, Q4H PRN, Duy Fulton MD, 2 5 mg at 04/13/21 1521    oxyCODONE (ROXICODONE) IR tablet 5 mg, 5 mg, Oral, Q4H PRN, Duy Fulton MD, 5 mg at 04/14/21 0925    pantoprazole (PROTONIX) EC tablet 20 mg, 20 mg, Oral, Early Morning, Duy Fulton MD, 20 mg at 04/14/21 0602    polyethylene glycol (MIRALAX) packet 17 g, 17 g, Oral, Daily PRN, Duy Fulton MD    simethicone (MYLICON) chewable tablet 80 mg, 80 mg, Oral, 4x Daily PRN, Duy Fulton MD    Past Medical History:   Diagnosis Date    Disease of thyroid gland        Patient Active Problem List    Diagnosis Date Noted    Tibial plateau fracture 40/76/4785     Priority: High    Left wrist distal radius nondisplaced fracture 04/03/2021     Priority: Medium    Impaired mobility and activities of daily living 04/13/2021    Angular cheilitis 04/08/2021    History of sleeve gastrectomy 04/08/2021    Anemia 04/08/2021    Vitamin D deficiency 04/07/2021    Thrombocytopenia ([address] ) 04/07/2021    Dizziness 04/05/2021    Ambulatory dysfunction 04/03/2021    Hypothyroidism 04/03/2021    GERD (gastroesophageal reflux disease) 04/03/2021    Obesity 04/03/2021          Chetan Quinones MD    Total time spent:  30 minutes with more than 50% spent counseling/coordinating care  Counseling includes discussion with patient re: progress and discussion with patient of his/her current medical/functional state/information  Coordination of patient's care was performed in conjunction with consulting services  Time invested included review of patient's labs, vitals, and management of their comorbidities with continued monitoring  The care of the patient was extensively discussed and appropriate treatment plan was formulated unique for this patient  ** Please Note:  voice to text software may have been used in the creation of this document   Although proof errors in transcription or interpretation are a potential of such software**

## 2021-04-14 NOTE — ASSESSMENT & PLAN NOTE
4/3 - R tibial plateau fracture and L nondisplaced wrist fracture s/p mechanical fall   + Head strike, LOC  CT head - no acute abnormalities  L wrist NWB, Sugar tong splint  Platform walker on L - allowed to bear weight on L elbow  Pain management  Follow-up with Ortho (Dr Ming Galeana) with repeat x-rays  (4/12)    X-ray on 4/12: Nondisplaced distal radius fracture is not well visualized  Alignment is near anatomic  PT/OT Therapies  Primary team following

## 2021-04-14 NOTE — PROGRESS NOTES
04/14/21 0830   Pain Assessment   Pain Assessment Tool 0-10   Pain Score 4   Pain Location/Orientation Orientation: Right;Location: Knee   Pain Onset/Description Onset: Ongoing   Restrictions/Precautions   Precautions Fall Risk   Weight Bearing Restrictions Yes   LUE Weight Bearing Per Order NWB   RLE Weight Bearing Per Order NWB   Cognition   Overall Cognitive Status WFL   Arousal/Participation Alert; Cooperative   Attention Within functional limits   Orientation Level Oriented X4   Memory Within functional limits   Following Commands Follows all commands and directions without difficulty   Subjective   Subjective Pt feels more swollen today in B LE  Pt feels her splint is too loose and bothering her   Roll Left and Right   Reason if not Attempted Safety concerns   Roll Left and Right CARE Score 88   Sit to Lying   Type of Assistance Needed Independent; Adaptive equipment   Amount of Physical Assistance Provided No physical assistance   Comment uses leg    Sit to Lying CARE Score 6   Lying to Sitting on Side of Bed   Type of Assistance Needed Independent; Adaptive equipment   Amount of Physical Assistance Provided No physical assistance   Comment uses leg    Lying to Sitting on Side of Bed CARE Score 6   Sit to Stand   Type of Assistance Needed Adaptive equipment;Set-up / clean-up;Supervision   Amount of Physical Assistance Provided No physical assistance   Comment w/ PRW   Sit to Stand CARE Score 4   Bed-Chair Transfer   Type of Assistance Needed Supervision; Adaptive equipment;Set-up / clean-up   Amount of Physical Assistance Provided No physical assistance   Comment w/ PRW   Chair/Bed-to-Chair Transfer CARE Score 4   Car Transfer   Reason if not Attempted Environmental limitations   Car Transfer CARE Score 10   Walk 10 Feet   Reason if not Attempted Safety concerns   Walk 10 Feet CARE Score 88   Walk 50 Feet with Two Turns   Reason if not Attempted Safety concerns   Walk 50 Feet with Two Turns CARE Score 88   Walk 150 Feet   Reason if not Attempted Safety concerns   Walk 150 Feet CARE Score 88   Walking 10 Feet on Uneven Surfaces   Reason if not Attempted Safety concerns   Walking 10 Feet on Uneven Surfaces CARE Score 88   Ambulation   Does the patient walk? 0  No, and walking goal is not clinically indicated  Primary Mode of Locomotion Prior to Admission Walk   Distance Walked (feet) 5 ft  (ft)   Assist Device Platform;Roller Walker   Gait Pattern Hopping   Limitations Noted In Endurance; Safety;Speed;Strength   Provided Assistance with: Balance   Walk Assist Level Supervision   Findings hopping fwd w/ PRW to chair   Wheel 50 Feet with Two Turns   Type of Assistance Needed Independent   Amount of Physical Assistance Provided No physical assistance   Comment R UE and L LE   Wheel 50 Feet with Two Turns CARE Score 6   Wheel 150 Feet   Type of Assistance Needed Independent   Amount of Physical Assistance Provided No physical assistance   Comment R UE and L LE   Wheel 150 Feet CARE Score 6   Wheelchair mobility   Does the patient use a wheelchair? 1  Yes   Type of Wheelchair Used 1  Manual   Method Right upper extremity; Left lower extremity   Distance Level Surface (feet) 200 ft  (ftx2  [id number])   Findings Independent with wc part management, no cueing needed  Uses leg  for RLE   Picking Up Object   Reason if not Attempted Safety concerns   Picking Up Object CARE Score 88   Therapeutic Interventions   Strengthening Supine on mat: R SLR AAROM from PT 3 x6  hip abd/add AAROM w/ slideboard R 2x10  Seated:  marching, LAQ, heel raise LLE 2x10   Balance wc <> //bars STS with SLS and UE support from // bars 1min x2, 2min x1    Other Comments   Comments Rewrapped L UE ace wrap due to pt reports of discomfort  Adjusted RLE knee brace for proper fit   Assessment   Treatment Assessment Pt engaged in 90min PT session with emphasis on wc mobility, transfers, balance, and LE strengthening   Pt reported increased pain and discomfort in her L wrist which she attributed to improper fit of her splint  PT rewrapped ace wrap ; however brace is very loose due the decrease in swelling  Peformed therex supine on mat this session, pt noted sharp pain below her knee  Attempted to relieve some pressure by placing RLE under wedge  Nurse brought in pain medicine, and ice applied RLE while seated in wc with RLE elevation  Pt was then able to complete STS at // bars, and increased stand tolerance to 2min  Pt daughter will be in for FT tomorrow and plan to focus on car transfers and education on brace management  Pt has achieved I goals for wc mobility at this time, and continues to progress towards I with all transfers with assist of leg lift for RLE  Pt is S for sit to stand and bed <> chair transfers  Plan to continue focus on transfers, wc mobility, and strengthing next session in order to improve overall independence and functional mobility to prepare for anticipated dc on 4/17 pending ortho input  Recommend dc at wc level and left platform RWfor short distance ambulation for bathroom access  Ordered walker and wc for dc  Problem List Decreased strength;Decreased range of motion;Decreased endurance; Impaired balance;Decreased mobility;Orthopedic restrictions   Barriers to Discharge Decreased caregiver support   PT Barriers   Physical Impairment Decreased mobility; Decreased endurance;Decreased strength; Impaired balance;Orthopedic restrictions   Functional Limitation Walking;Transfers;Stair negotiation;Ramp negotiation;Car transfers   Plan   Treatment/Interventions Functional transfer training;LE strengthening/ROM; Elevations; Therapeutic exercise; Endurance training;Patient/family training;Equipment eval/education   Progress Progressing toward goals   Recommendation   PT Discharge Recommendation Home with home health rehabilitation   Equipment Recommended Walker; Wheelchair   PT - OK to Discharge Yes   PT Discharge Recommendation Home with skilled therapy   PT Therapy Minutes   PT Time In 0830   PT Time Out 1000   PT Total Time (minutes) 90   PT Mode of treatment - Individual (minutes) 90   PT Mode of treatment - Concurrent (minutes) 0   PT Mode of treatment - Group (minutes) 0   PT Mode of treatment - Co-treat (minutes) 0   PT Mode of Treatment - Total time(minutes) 90 minutes   PT Cumulative Minutes 600   Therapy Time missed   Time missed?  No

## 2021-04-14 NOTE — PROGRESS NOTES
Progress Note - Orthopedics   Moni Toribio 63 y o  female MRN: [id number]  Unit/Bed#: [facility] 264-01      Subjective:    63 y  o female seen and examined by me today for complaints of a loose splint on the left upper extremity  The patient is 10 days s/p right tibial plateau fracture and left distal radius fracture  She was treated non-operatively for both injuries with a TROM on the RLE and a sugar tong splint on the LUE  She was transitioned today [facility] approximately 6 days ago and has been making progress  She reports pain at the distal radius and a rubbing sensation at the forearm  The OT reports she tried to rewrap the arm today to help with the looseness of the splint but the patient continues to report pain        Labs:  0   Lab Value Date/Time    HCT 34 4 (L) 04/12/2021 0640    HCT 36 5 04/08/2021 0549    HCT 35 8 04/06/2021 0540    HGB 11 5 (L) 04/12/2021 0640    HGB 12 1 04/08/2021 0549    HGB 11 5 04/06/2021 0540    WBC 4 20 (L) 04/12/2021 0640    WBC 4 40 (L) 04/08/2021 0549    WBC 4 16 (L) 04/06/2021 0540       Meds:    Current Facility-Administered Medications:     acetaminophen (TYLENOL) tablet 650 mg, 650 mg, Oral, Q6H PRN, Brennen Correia MD, 650 mg at 04/09/21 7991    aluminum-magnesium hydroxide-simethicone (MYLANTA) oral suspension 30 mL, 30 mL, Oral, Q6H PRN, Brennen Correia MD    bisacodyl (DULCOLAX) rectal suppository 10 mg, 10 mg, Rectal, Daily PRN, Brennen Correia MD    calcium carbonate (TUMS) chewable tablet 1,000 mg, 1,000 mg, Oral, Daily PRN, Brennen Correia MD    cyclobenzaprine (FLEXERIL) tablet 5 mg, 5 mg, Oral, TID PRN, Brennen Correia MD    docusate sodium (COLACE) capsule 100 mg, 100 mg, Oral, BID, Brennen Correia MD, 100 mg at 04/14/21 0820    enoxaparin (LOVENOX) subcutaneous injection 40 mg, 40 mg, Subcutaneous, Q24H [facility], Brennen Correia MD, 40 mg at 04/14/21 0820    ergocalciferol (VITAMIN D2) capsule 50,000 Units, 50,000 Units, Oral, Weekly, ELEUTERIO Nova, 50,000 Units at 04/09/21 0827    levothyroxine tablet 175 mcg, 175 mcg, Oral, Early Morning, Michel Spencer MD, 175 mcg at 04/14/21 0602    ondansetron (ZOFRAN-ODT) dispersible tablet 4 mg, 4 mg, Oral, Q6H PRN, Michle Spencer MD    oxyCODONE (ROXICODONE) IR tablet 2 5 mg, 2 5 mg, Oral, Q4H PRN, Michel Spencer MD, 2 5 mg at 04/13/21 1521    oxyCODONE (ROXICODONE) IR tablet 5 mg, 5 mg, Oral, Q4H PRN, Michel Spencer MD, 5 mg at 04/14/21 0925    pantoprazole (PROTONIX) EC tablet 20 mg, 20 mg, Oral, Early Morning, Michel Spencer MD, 20 mg at 04/14/21 0602    polyethylene glycol (MIRALAX) packet 17 g, 17 g, Oral, Daily PRN, Michel Spencer MD    simethicone (MYLICON) chewable tablet 80 mg, 80 mg, Oral, 4x Daily PRN, Michel Spencer MD    Blood Culture:   No results found for: BLOODCX    Wound Culture:   No results found for: WOUNDCULT    Ins and Outs:  I/O last 24 hours: In: 640 [P O :640]  Out: -           Physical:  Vitals:    04/14/21 0558   BP: 111/59   Pulse: 72   Resp: 16   Temp: 97 8 °F (36 6 °C)   SpO2: 100%     Musculoskeletal: left Upper Extremity  · Skin Sugar tong splint in place upon arrival  This was completely taken off by me to exam skin under the splint  There is one area ecchymosis on the radial aspect of the wrist  No open wounds, no erythema  Mild swelling as to be expected  · TTP over the distal radius  · SILT m/r/u  Motor intact ain/pin/m/r/u, 2+ radial pulse  Limb is warm and well perfused with good color and capillary refill distally  Right lower extremity exam: TROM is place upon arrival  +DF/PF, +EHL/FHL  Knee ROM deferred secondary to fracture  Assessment:    63 y  o female 10 days s/p nondisplaced left distal radius fracture and nondisplaced right tibial plateau fractures with splint loosening and discomfort  Plan:  · NWB LUE and RLE  May use platform walker  · Sugar tong splint was taken down by me today and reapplied with new padding and ACE wraps     · PT/OT  · Pain control prn   · DVT ppx per PMR team    · XRs of the left wrist and right knee were reviewed which demonstrate no interval changes in alignment of distal radius fracture and tibial plateau fracture  · Dispo: [facility] for discharge from ortho perspective, per PMR notes, patient may be discharged from [facility] on saturday  Will require outpatient follow-up with Dr Nayana Leon next week after discharge       Herminia Flaherty PA-C

## 2021-04-14 NOTE — PLAN OF CARE
Problem: PAIN - ADULT  Goal: Verbalizes/displays adequate comfort level or baseline comfort level  Description: Interventions:  - Encourage patient to monitor pain and request assistance  - Assess pain using appropriate pain scale  - Administer analgesics based on type and severity of pain and evaluate response  - Implement non-pharmacological measures as appropriate and evaluate response  - Consider cultural and social influences on pain and pain management  - Notify physician/advanced practitioner if interventions unsuccessful or patient reports new pain  Outcome: Progressing     Problem: INFECTION - ADULT  Goal: Absence or prevention of progression during hospitalization  Description: INTERVENTIONS:  - Assess and monitor for signs and symptoms of infection  - Monitor lab/diagnostic results  - Monitor all insertion sites, i e  indwelling lines, tubes, and drains  - Monitor endotracheal if appropriate and nasal secretions for changes in amount and color  - [place] appropriate cooling/warming therapies per order  - Administer medications as ordered  - Instruct and encourage patient and family to use good hand hygiene technique  - Identify and instruct in appropriate isolation precautions for identified infection/condition  Outcome: Progressing  Goal: Absence of fever/infection during neutropenic period  Description: INTERVENTIONS:  - Monitor WBC    Outcome: Progressing     Problem: SAFETY ADULT  Goal: Patient will remain free of falls  Description: INTERVENTIONS:  - Assess patient frequently for physical needs  -  Identify cognitive and physical deficits and behaviors that affect risk of falls    -  [place] fall precautions as indicated by assessment   - Educate patient/family on patient safety including physical limitations  - Instruct patient to call for assistance with activity based on assessment  - Modify environment to reduce risk of injury  - Consider OT/PT consult to assist with strengthening/mobility  Outcome: Progressing  Goal: Maintain or return to baseline ADL function  Description: INTERVENTIONS:  -  Assess patient's ability to carry out ADLs; assess patient's baseline for ADL function and identify physical deficits which impact ability to perform ADLs (bathing, care of mouth/teeth, toileting, grooming, dressing, etc )  - Assess/evaluate cause of self-care deficits   - Assess range of motion  - Assess patient's mobility; develop plan if impaired  - Assess patient's need for assistive devices and provide as appropriate  - Encourage maximum independence but intervene and supervise when necessary  - Involve family in performance of ADLs  - Assess for home care needs following discharge   - Consider OT consult to assist with ADL evaluation and planning for discharge  - Provide patient education as appropriate  Outcome: Progressing  Goal: Maintain or return mobility status to optimal level  Description: INTERVENTIONS:  - Assess patient's baseline mobility status (ambulation, transfers, stairs, etc )    - Identify cognitive and physical deficits and behaviors that affect mobility  - Identify mobility aids required to assist with transfers and/or ambulation (gait belt, sit-to-stand, lift, walker, cane, etc )  - [place] fall precautions as indicated by assessment  - Record patient progress and toleration of activity level on Mobility SBAR; progress patient to next Phase/Stage  - Instruct patient to call for assistance with activity based on assessment  - Consider rehabilitation consult to assist with strengthening/weightbearing, etc   Outcome: Progressing     Problem: DISCHARGE PLANNING  Goal: Discharge to home or other facility with appropriate resources  Description: INTERVENTIONS:  - Identify barriers to discharge w/patient and caregiver  - Arrange for needed discharge resources and transportation as appropriate  - Identify discharge learning needs (meds, wound care, etc )  - Arrange for interpretive services to assist at discharge as needed  - Refer to Case Management Department for coordinating discharge planning if the patient needs post-hospital services based on physician/advanced practitioner order or complex needs related to functional status, cognitive ability, or social support system  Outcome: Progressing     Problem: Knowledge Deficit  Goal: Patient/family/caregiver demonstrates understanding of disease process, treatment plan, medications, and discharge instructions  Description: Complete learning assessment and assess knowledge base  Interventions:  - Provide teaching at level of understanding  - Provide teaching via preferred learning methods  Outcome: Progressing     Problem: Potential for Falls  Goal: Patient will remain free of falls  Description: INTERVENTIONS:  - Assess patient frequently for physical needs  -  Identify cognitive and physical deficits and behaviors that affect risk of falls    -  [place] fall precautions as indicated by assessment   - Educate patient/family on patient safety including physical limitations  - Instruct patient to call for assistance with activity based on assessment  - Modify environment to reduce risk of injury  - Consider OT/PT consult to assist with strengthening/mobility  Outcome: Progressing     Problem: Prexisting or High Potential for Compromised Skin Integrity  Goal: Skin integrity is maintained or improved  Description: INTERVENTIONS:  - Identify patients at risk for skin breakdown  - Assess and monitor skin integrity  - Assess and monitor nutrition and hydration status  - Monitor labs   - Assess for incontinence   - Turn and reposition patient  - Assist with mobility/ambulation  - Relieve pressure over bony prominences  - Avoid friction and shearing  - Provide appropriate hygiene as needed including keeping skin clean and dry  - Evaluate need for skin moisturizer/barrier cream  - Collaborate with interdisciplinary team   - Patient/family teaching  - Consider wound care consult   Outcome: Progressing     Problem: GASTROINTESTINAL - ADULT  Goal: Minimal or absence of nausea and/or vomiting  Description: INTERVENTIONS:  - Administer IV fluids if ordered to ensure adequate hydration  - Maintain NPO status until nausea and vomiting are resolved  - Nasogastric tube if ordered  - Administer ordered antiemetic medications as needed  - Provide nonpharmacologic comfort measures as appropriate  - Advance diet as tolerated, if ordered  - Consider nutrition services referral to assist patient with adequate nutrition and appropriate food choices  Outcome: Progressing  Goal: Maintains or returns to baseline bowel function  Description: INTERVENTIONS:  - Assess bowel function  - Encourage oral fluids to ensure adequate hydration  - Administer IV fluids if ordered to ensure adequate hydration  - Administer ordered medications as needed  - Encourage mobilization and activity  - Consider nutritional services referral to assist patient with adequate nutrition and appropriate food choices  Outcome: Progressing  Goal: Maintains adequate nutritional intake  Description: INTERVENTIONS:  - Monitor percentage of each meal consumed  - Identify factors contributing to decreased intake, treat as appropriate  - Assist with meals as needed  - Monitor I&O, weight, and lab values if indicated  - Obtain nutrition services referral as needed  Outcome: Progressing     Problem: GENITOURINARY - ADULT  Goal: Maintains or returns to baseline urinary function  Description: INTERVENTIONS:  - Assess urinary function  - Encourage oral fluids to ensure adequate hydration if ordered  - Administer IV fluids as ordered to ensure adequate hydration  - Administer ordered medications as needed  - Offer frequent toileting  - Follow urinary retention protocol if ordered  Outcome: Progressing     Problem: METABOLIC, FLUID AND ELECTROLYTES - ADULT  Goal: Electrolytes maintained within normal limits  Description: INTERVENTIONS:  - Monitor labs and assess patient for signs and symptoms of electrolyte imbalances  - Administer electrolyte replacement as ordered  - Monitor response to electrolyte replacements, including repeat lab results as appropriate  - Instruct patient on fluid and nutrition as appropriate  Outcome: Progressing  Goal: Fluid balance maintained  Description: INTERVENTIONS:  - Monitor labs   - Monitor I/O and WT  - Instruct patient on fluid and nutrition as appropriate  - Assess for signs & symptoms of volume excess or deficit  Outcome: Progressing     Problem: SKIN/TISSUE INTEGRITY - ADULT  Goal: Skin integrity remains intact  Description: INTERVENTIONS  - Identify patients at risk for skin breakdown  - Assess and monitor skin integrity  - Assess and monitor nutrition and hydration status  - Monitor labs (i e  albumin)  - Assess for incontinence   - Turn and reposition patient  - Assist with mobility/ambulation  - Relieve pressure over bony prominences  - Avoid friction and shearing  - Provide appropriate hygiene as needed including keeping skin clean and dry  - Evaluate need for skin moisturizer/barrier cream  - Collaborate with interdisciplinary team (i e  Nutrition, Rehabilitation, etc )   - Patient/family teaching  Outcome: Progressing     Problem: HEMATOLOGIC - ADULT  Goal: Maintains hematologic stability  Description: INTERVENTIONS  - Assess for signs and symptoms of bleeding or hemorrhage  - Monitor labs  - Administer supportive blood products/factors as ordered and appropriate  Outcome: Progressing     Problem: MUSCULOSKELETAL - ADULT  Goal: Maintain or return mobility to safest level of function  Description: INTERVENTIONS:  - Assess patient's ability to carry out ADLs; assess patient's baseline for ADL function and identify physical deficits which impact ability to perform ADLs (bathing, care of mouth/teeth, toileting, grooming, dressing, etc )  - Assess/evaluate cause of self-care deficits   - Assess range of motion  - Assess patient's mobility  - Assess patient's need for assistive devices and provide as appropriate  - Encourage maximum independence but intervene and supervise when necessary  - Involve family in performance of ADLs  - Assess for home care needs following discharge   - Consider OT consult to assist with ADL evaluation and planning for discharge  - Provide patient education as appropriate  Outcome: Progressing  Goal: Maintain proper alignment of affected body part  Description: INTERVENTIONS:  - Support, maintain and protect limb and body alignment  - Provide patient/ family with appropriate education  Outcome: Progressing     Problem: Prexisting or High Potential for Compromised Skin Integrity  Goal: Skin integrity is maintained or improved  Description: INTERVENTIONS:  - Identify patients at risk for skin breakdown  - Assess and monitor skin integrity  - Assess and monitor nutrition and hydration status  - Monitor labs   - Assess for incontinence   - Turn and reposition patient  - Assist with mobility/ambulation  - Relieve pressure over bony prominences  - Avoid friction and shearing  - Provide appropriate hygiene as needed including keeping skin clean and dry  - Evaluate need for skin moisturizer/barrier cream  - Collaborate with interdisciplinary team   - Patient/family teaching  - Consider wound care consult   Outcome: Progressing

## 2021-04-14 NOTE — PROGRESS NOTES
[facility]  Progress Note Margarita Pencil 1965, 63 y o  female MRN: [id number]  Unit/Bed#: [facility] 126-86 Encounter: [id number]  Primary Care Provider: Moni Johns MD   Date and time admitted to hospital: 4/7/2021  4:05 PM    Anemia  Assessment & Plan  Hgb currently 11 5 from 12 1  Asymptomatic  Monitor for acute s/s of bleeding  Continue to trend with routine CBC  Angular cheilitis  Assessment & Plan  Noted on 4/8, not itchy or painful  Currently receiving bacitracin ointment  Pt has had in past and resolves with vaseline  Improved  Bacitracin discontinued  Thrombocytopenia (HCC)  Assessment & Plan  Plt count currently 170 from 121 on 4/6  Asymptomatic  Currently on lovenox injections  No acute s/s of bleeding  Will continue to trend  Vitamin D deficiency  Assessment & Plan  Last Vitamin D level was 14 in 2019  Vitamin D level 12 4 on 4/8  Started on Vitamin D supplementation, 50,000u weekly  Continue for 6 weeks and follow-up with PCP  Dizziness  Assessment & Plan  Experienced orthostasis in the acute setting that resolved after IV fluids  Monitor BP with routine VS   Encourage fluid intake  Orthostatic BPs obtained  Currently asymptomatic  Obesity  Assessment & Plan  Nutritional counseling as needed  Gastric sleeve surgery 5 years ago  Recently started to gain weight, Endocrine ordered Contrave for patient, but she would prefer nutritional counseling  Has an appointment to follow-up with Weight Management  Left wrist distal radius nondisplaced fracture  Assessment & Plan  4/3 - R tibial plateau fracture and L nondisplaced wrist fracture s/p mechanical fall   + Head strike, LOC  CT head - no acute abnormalities  L wrist NWB, Sugar tong splint  Platform walker on L - allowed to bear weight on L elbow  Pain management    Follow-up with Ortho (Dr Massimo Paniagua) with repeat x-rays  (4/12)    X-ray on 4/12: Nondisplaced distal radius fracture is not well visualized  Alignment is near anatomic  PT/OT Therapies  Primary team following  GERD (gastroesophageal reflux disease)  Assessment & Plan  Takes Pepcid 20mg daily at home  Non-formulary  Continue with Protonix 20mg daily  Hypothyroidism  Assessment & Plan  Currently taking levothyroxine 175mcg  Dose decreased by Endocrinology in March  Last TSH was 0 14 and T4 was 1 57 on 1/28  Continue to follow-up with Endocrinology as outpatient  TSH 2 190 on 4/13  Continue current levothyroxine dose  * Tibial plateau fracture  Assessment & Plan  4/3 - R tibial plateau fracture and L nondisplaced wrist fracture s/p mechanical fall   + Head strike, LOC  CT head - no acute abnormalities  Pain management  L wrist NWB, Sugar tong splint  RLE NWB, TROM brace locked in extension  Platform walker on L - allowed to bear weight on L elbow  Follow-up with Ortho (Dr Marbella Benz) with repeat x-rays  (4/12)    X-ray on 4/12 - "Nondisplaced fracture of the proximal tibial metaphysis with intra-articular extension along the lateral tibial eminence, unchanged "    PT/OT Therapies  Primary team following  VTE Pharmacologic Prophylaxis:   Pharmacologic: Enoxaparin (Lovenox)  Mechanical VTE Prophylaxis in Place: Yes - sequential compression devices  Current Length of Stay: 7 day(s)    Current Patient Status: Inpatient Rehab     Discharge Plan: As per primary team     Code Status: Level 1 - Full Code    Subjective:   Pt examined while sitting in recliner in pt room  Complaints of 9/10 RLE pain that started during therapy, received oxy and now states the pain is 3-4/10, aching/throbbing  Wrist pain is minimal currently but feels that her splint is too loose and feels uncomfortable  Plans for Ortho to stop by at some point today and will be able to hopefully transition her to a new splint  Denies any numbness or tingling in her fingers/toes  Sleeping well  Therapy is going well    LBM this morning with no issues  Objective:     Vitals:   Temp (24hrs), Av °F (36 7 °C), Min:97 3 °F (36 3 °C), Max:99 °F (37 2 °C)    Temp:  [97 3 °F (36 3 °C)-99 °F (37 2 °C)] 97 8 °F (36 6 °C)  HR:  [72-80] 72  Resp:  [16-18] 16  BP: ()/(54-59) 111/59  SpO2:  [100 %] 100 %  Body mass index is 37 33 kg/m²  Review of Systems   Constitutional: Negative for chills, fatigue and fever  Respiratory: Negative for cough and shortness of breath  Cardiovascular: Negative for chest pain, palpitations and leg swelling  Gastrointestinal: Negative for abdominal distention, abdominal pain, constipation, diarrhea, nausea and vomiting  LBM    Genitourinary: Negative for difficulty urinating  Musculoskeletal: Positive for arthralgias (RLE pain 9/10, decreased to 3-4/10 after receiving pain medication, aching/throbbing  Minimal L wrist pain - discomfort due to looseness of the brace)  Neurological: Negative for dizziness and light-headedness  Psychiatric/Behavioral: Negative for sleep disturbance  Input and Output Summary (last 24 hours): Intake/Output Summary (Last 24 hours) at 2021 0856  Last data filed at 2021 9487  Gross per 24 hour   Intake 400 ml   Output --   Net 400 ml       Physical Exam:     Physical Exam  Vitals signs and nursing note reviewed  Constitutional:       Appearance: Normal appearance  She is obese  HENT:      Head: Normocephalic and atraumatic  Cardiovascular:      Rate and Rhythm: Normal rate and regular rhythm  Pulses: Normal pulses  Heart sounds: Normal heart sounds  No murmur  No friction rub  Pulmonary:      Effort: Pulmonary effort is normal  No respiratory distress  Breath sounds: Normal breath sounds  No wheezing or rhonchi  Abdominal:      General: Abdomen is flat  Bowel sounds are normal  There is no distension  Palpations: Abdomen is soft  Tenderness: There is no abdominal tenderness     Musculoskeletal:      Right lower leg: No edema  Left lower leg: No edema  Comments: LUE in splint  RLE in knee immobilizer  Skin:     General: Skin is warm and dry  Capillary Refill: Capillary refill takes less than 2 seconds  Neurological:      Mental Status: She is alert and oriented to person, place, and time     Psychiatric:         Mood and Affect: Mood normal          Behavior: Behavior normal          Additional Data:     Labs:    Results from last 7 days   Lab Units 04/12/21  0640   WBC Thousand/uL 4 20*   HEMOGLOBIN g/dL 11 5*   HEMATOCRIT % 34 4*   PLATELETS Thousands/uL 170   NEUTROS PCT % 53   LYMPHS PCT % 33   MONOS PCT % 11*   EOS PCT % 3     Results from last 7 days   Lab Units 04/12/21  0640   SODIUM mmol/L 136*   POTASSIUM mmol/L 4 2   CHLORIDE mmol/L 101   CO2 mmol/L 32*   BUN mg/dL 16   CREATININE mg/dL 0 60   ANION GAP mmol/L 3*   CALCIUM mg/dL 9 5   GLUCOSE RANDOM mg/dL 96                       Labs reviewed    Imaging:    Imaging reviewed    Recent Cultures (last 7 days):           Last 24 Hours Medication List:   Current Facility-Administered Medications   Medication Dose Route Frequency Provider Last Rate    acetaminophen  650 mg Oral Q6H PRN Richard De Luna MD      aluminum-magnesium hydroxide-simethicone  30 mL Oral Q6H PRN Richard De Luna MD      bisacodyl  10 mg Rectal Daily PRN Richard De Luna MD      calcium carbonate  1,000 mg Oral Daily PRN Richard De Luna MD      cyclobenzaprine  5 mg Oral TID PRN Richard De Luna MD      docusate sodium  100 mg Oral BID Richard De Luna MD      enoxaparin  40 mg Subcutaneous Q24H [address] Richard De Luna MD      ergocalciferol  50,000 Units Oral Weekly ELEUTERIO Gramajo      levothyroxine  175 mcg Oral Early Morning Richard De Luna MD      ondansetron  4 mg Oral Q6H PRN Richard De Luna MD      oxyCODONE  2 5 mg Oral Q4H PRN Richard De Luna MD      oxyCODONE  5 mg Oral Q4H PRN Richard De Luna MD      pantoprazole  20 mg Oral Early Morning Richard De Luna MD     [facility] polyethylene glycol  17 g Oral Daily PRN Tracy Williamson MD      simethicone  80 mg Oral 4x Daily PRN Tracy Williamson MD          M*Modal software was used to dictate this note  It may contain errors with dictating incorrect words or incorrect spelling  Please contact the provider directly with any questions

## 2021-04-14 NOTE — ASSESSMENT & PLAN NOTE
4/3 - R tibial plateau fracture and L nondisplaced wrist fracture s/p mechanical fall   + Head strike, LOC  CT head - no acute abnormalities  Pain management  L wrist NWB, Sugar tong splint  RLE NWB, TROM brace locked in extension  Platform walker on L - allowed to bear weight on L elbow  Follow-up with Ortho (Dr Vandana Cole) with repeat x-rays  (4/12)    X-ray on 4/12 - "Nondisplaced fracture of the proximal tibial metaphysis with intra-articular extension along the lateral tibial eminence, unchanged "    PT/OT Therapies  Primary team following

## 2021-04-14 NOTE — PROGRESS NOTES
04/14/21 1230   Pain Assessment   Pain Assessment Tool Pain Assessment not indicated - pt denies pain   Pain Score No Pain   Restrictions/Precautions   Precautions Fall Risk   Weight Bearing Restrictions Yes   LUE Weight Bearing Per Order NWB   RLE Weight Bearing Per Order NWB   Sit to Lying   Type of Assistance Needed Independent; Adaptive equipment   Amount of Physical Assistance Provided No physical assistance   Comment with use of leg    Sit to Lying CARE Score 6   Sit to Stand   Type of Assistance Needed Supervision; Adaptive equipment   Amount of Physical Assistance Provided No physical assistance   Comment S with PRW   Sit to Stand CARE Score 4   Bed-Chair Transfer   Type of Assistance Needed Supervision   Amount of Physical Assistance Provided No physical assistance   Comment S with PRW   Chair/Bed-to-Chair Transfer CARE Score 4   Transfer Bed/Chair/Wheelchair   Limitations Noted In Balance; Endurance;LE Strength;UE Strength   Adaptive Equipment Roller Walker;Platform   Toileting Hygiene   Type of Assistance Needed Supervision; Adaptive equipment   Amount of Physical Assistance Provided No physical assistance   Comment S ins tance pt ablet o lean to L side and WB thorugh elbow on GB and complete CM/hygiene   Toileting Hygiene CARE Score 4   Toileting   Able to [address] down yes, up yes  Able to Manage Clothing Closures Yes   Manage Hygiene Bladder   Limitations Noted In Balance;ROM;UE Strength;LE Strength   Toilet Transfer   Type of Assistance Needed Supervision; Adaptive equipment   Amount of Physical Assistance Provided No physical assistance   Comment S for SPT with use of GB with RUE  educated to place wc close to BSC,pt ablet o remove leg rest and elevate RLE with leg    Toilet Transfer CARE Score 4   Toilet Transfer   Surface Assessed Standard Commode   Transfer Technique Stand Pivot   Limitations Noted In Balance; Endurance;ROM;UE Strength;LE Strength   Adaptive Equipment Grab Bar [employer] Re-[employer]   Community Re-entry Level Wheelchair   Community Re-entry Level of Assistance Close supervision   Community Re-entry therapist engaged pt in community re-entry [facility] including various surfaces and elevator se  Pt educated on backing into elevator in order to prevent lip  Pt req A at times for managing wc on concrete and small ramps  Pt with G safety awareness wile approaching corners and aware of pedenstrians  Functional Standing Tolerance   Time 5mins   Activity unilateral release   Comments pt engaged in unilateral release to [facility] BREEZY, endurance, strength, coord and weight shifting  for inc independence in toileting including CM   Therapeutic Excerise-Strength   UE Strength Yes   Right Upper Extremity- Strength   R Shoulder Flexion; Extension;Horizontal ABduction   R Elbow Elbow flexion;Elbow extension   R Position Seated   Equipment Theraband  (blue)   R Weight/Reps/Sets 2x15   RUE Strength Comment provided ptw ith RUE HEP with blue band to cont to inc fx strnegthf or improved sit<>stand and SPT  toelrated well withr est break inb etween   Left Upper Extremity-Strength   LUE Strength Comment NA NWB   Cognition   Overall Cognitive Status WFL   Arousal/Participation Alert; Cooperative   Attention Within functional limits   Orientation Level Oriented X4   Memory Within functional limits   Following Commands Follows all commands and directions without difficulty   Additional Activities   Additional Activities Other (Comment)  (contacted dtr)   Additional Activities Comments Therapist contacted pts dtr Lalitha Sanchez [phone]) to discuss pts current LOF and OT reccomendations for home  Therapist communicated with dtr that pt is anticiapted to achieve Cole for toileting  Will need A for ADls, which dtr reports she will A pt in the Am for dressing and providing breakfast and will leave pt settled before she goes to work   However, does report between her aunt and brother people will be checking in  In addition, dtr reports she already has BSC, leg , reacher and GB will be installed in bathroom this afternoon  Dtr is in agreement with OT recommendation at this time with no further questions   Activity Tolerance   Activity Tolerance Patient tolerated treatment well   Assessment   Treatment Assessment Pt engaged in 90mins of skilled OT services with focus on toielting, wc leg rest mgnmnt, RUE HEP, wc mobility, contacting dtr  Pt at this time is making steady gains toward LTG  Dtr in agreemetn with all OT recommendations, see above  Prognosis Good   Problem List Decreased strength;Decreased range of motion;Decreased endurance; Impaired balance   Barriers to Discharge Inaccessible home environment;Decreased caregiver support   Plan   Treatment/Interventions ADL retraining;Functional transfer training; Therapeutic exercise; Endurance training;Patient/family training;Bed mobility; Compensatory technique education   Progress Progressing toward goals   Recommendation   OT Discharge Recommendation No rehabilitation needs  (no home OT needed)   Equipment Recommended Reacher ($);Bedside commode  (leg )   OT Equipment ordered dtr has purchased   OT Therapy Minutes   OT Time In 1230   OT Time Out 1430   OT Total Time (minutes) 120   OT Mode of treatment - Individual (minutes) 120   OT Mode of treatment - Concurrent (minutes) 0   OT Mode of treatment - Group (minutes) 0   OT Mode of treatment - Co-treat (minutes) 0   OT Mode of Treatment - Total time(minutes) 120 minutes   OT Cumulative Minutes 690   Therapy Time missed   Time missed?  No

## 2021-04-14 NOTE — PROGRESS NOTES
04/13/21 0700   Pain Assessment   Pain Assessment Tool 0-10   Pain Score 2   Pain Location/Orientation Orientation: Left; Location: Arm;Orientation: Right;Location: Leg  (LUE, RLE)   Pain Onset/Description Onset: Ongoing   Effect of Pain on Daily Activities tolerated   Patient's Stated Pain Goal No pain   Hospital Pain Intervention(s) Repositioned;Elevated; Rest   Multiple Pain Sites No   Restrictions/Precautions   Precautions Fall Risk   Weight Bearing Restrictions Yes   LUE Weight Bearing Per Order NWB   RLE Weight Bearing Per Order NWB   Eating   Type of Assistance Needed Set-up / clean-up   Amount of Physical Assistance Provided No physical assistance   Eating CARE Score 5   Oral Hygiene   Reason if not Attempted Refused to perform  (reoprts she compelted this AM)   Oral Hygiene CARE Score 7   Shower/Bathe Self   Type of Assistance Needed Physical assistance   Amount of Physical Assistance Provided Less than 25%   Comment only req A to wash feet  CGA ins tance for arley/buttock aarea   Shower/Bathe Self CARE Score 3   Bathing   Assessed Bath Style Sponge Bath   Anticipated D/C Bath Style Sponge Bath   Able to Gather/Transport No   Able to [employer] Temperature No   Able to Wash/Rinse/Dry (body part) Left Arm;Right Arm;L Upper Leg;R Upper Leg;Chest;Abdomen;Perineal Area; Buttocks   Limitations Noted in Balance;Neglect;Strength;Timeliness   Positioning Seated;Standing   Tub/Shower Transfer   Reason Not Assessed Sponge Bath;Medical   Upper Body Dressing   Type of Assistance Needed Physical assistance   Amount of Physical Assistance Provided Less than 25%   Comment A only to clasp bra from behind   Upper Body Dressing CARE Score 3   Lower Body Dressing   Type of Assistance Needed Incidental touching; Adaptive equipment   Amount of Physical Assistance Provided No physical assistance   Comment use of LHR to don undergarment/pants iwth CGAin stance for CM   Lower Body Dressing CARE Score 4   Putting On/Taking Off Footwear   Type of Assistance Needed Physical assistance   Amount of Physical Assistance Provided Less than 25%   Comment A only to don R sock  use of cross leg method to don L sock/shoe   Putting On/Taking Off Footwear CARE Score 3   Dressing/Undressing Clothing   Remove UB Clothes Pullover Shirt   Don UB Clothes Bra;Pullover Shirt   Remove LB Clothes Shorts;Socks   Don LB Clothes Undergarment; Shorts;Socks; Shoes   Limitations Noted In Balance; Endurance;ROM; Timeliness   Adaptive Equipment Reacher   Positioning Supported Sit;Standing   Sit to Stand   Type of Assistance Needed Supervision; Adaptive equipment   Amount of Physical Assistance Provided No physical assistance   Comment CS with PRW   Sit to Stand CARE Score 4   Bed-Chair Transfer   Type of Assistance Needed Supervision   Amount of Physical Assistance Provided No physical assistance   Comment CS with PRW SPT   Chair/Bed-to-Chair Transfer CARE Score 4   Transfer Bed/Chair/Wheelchair   Limitations Noted In Balance; Endurance;UE Strength;LE Strength   Adaptive Equipment Platform;Roller [address]   Type of Assistance Needed Incidental touching   Amount of Physical Assistance Provided No physical assistance   Toileting Hygiene CARE Score 4   Toileting   Able to [address] down yes, up yes  Able to Manage Clothing Closures Yes   Manage Hygiene Bladder   Limitations Noted In Balance;ROM;UE Strength;LE Strength   Toilet Transfer   Type of Assistance Needed Incidental touching   Amount of Physical Assistance Provided No physical assistance   Comment engaged pt in simulated toilet transfer for home setup  pt reporting dtr is installing 2GB on L/front of toilet  Pt ablet o use L GB to perform SPT while maintaining NWB  Therapist educated pt on safely positioning wc an elevating L arm rest to clear   Pt able to complete   Toilet Transfer CARE Score 4   Toilet Transfer   Surface Assessed Standard Commode   Transfer Technique Stand Pivot   Limitations Noted In Balance; Endurance;ROM;UE Strength;LE Strength   Adaptive Equipment Grab Bar   Functional Standing Tolerance   Time [id number]; [id number]   Activity unilateral release   Comments therapist engaged pt in unilateral release activity to challenge endurance, balance, activity tolerance  pt demonstrates inc activity tolerance as evidenced by in time vs from previous trial   Right Upper Extremity- Strength   R Shoulder Flexion; Extension;Horizontal ABduction   R Elbow Elbow flexion;Elbow extension   R Position Seated   Equipment Dumbbell  (4#)   R Weight/Reps/Sets 2x15   RUE Strength Comment engaged pt in RUE TE to inc fx strengthf or improved sit<>stand and SPT   Cognition   Overall Cognitive Status [facility]   Arousal/Participation Alert; Cooperative   Attention Within functional limits   Orientation Level Oriented X4   Memory Within functional limits   Following Commands Follows all commands and directions without difficulty   Activity Tolerance   Activity Tolerance Patient tolerated treatment well   Assessment   Treatment Assessment Pt engaged in 90mins of skilled OT services with focus on slef-care, toielting, transfers, RUe TE, unilateral release  Pt at this time performing at CGA/CS level  Pt with G fernandaove rof LHAE for lB dressing, cont to req A for R sock, reports dtr will A  prior to leaving for work  Pt rpeorting dtr already has BSC and is having GB installed  Pt will be LTG at time of DC  Cont OT POC with focus on toielting, simulated to home envt, pt will have GB on L/infornt of toilet, practice CM/hygiene, unialteral release, RUE TE to inc fx performance and indepenendece  Prognosis Good   Problem List Decreased strength;Decreased range of motion;Decreased endurance; Impaired balance;Decreased mobility;Orthopedic restrictions   Plan   Treatment/Interventions ADL retraining;Functional transfer training; Therapeutic exercise; Endurance training;Patient/family training;Bed mobility; Compensatory technique education   Progress Progressing toward goals   Recommendation   OT Discharge Recommendation Home with skilled therapy   Equipment Recommended Reacher ($);Sock aid ($);Bedside commode  (leg , GB around toilet, brake extenders)   OT Equipment ordered dtr purchasing brake extender, leg  and reacher, already has BSC   OT - OK to Discharge Yes   OT Therapy Minutes   OT Time In 0700   OT Time Out 0830   OT Total Time (minutes) 90   OT Mode of treatment - Individual (minutes) 90   OT Mode of treatment - Concurrent (minutes) 0   OT Mode of treatment - Group (minutes) 0   OT Mode of treatment - Co-treat (minutes) 0   OT Mode of Treatment - Total time(minutes) 90 minutes   OT Cumulative Minutes 570   Therapy Time missed   Time missed?  No

## 2021-04-14 NOTE — CASE MANAGEMENT
Attempted to call Gurjit Ruiz from [address] [place] [address] again  I was able to connect with another representative, Steven Garay, who provided fax # [phone] to send clinicals to  Steven Garay will be the patient's representative moving forward  His direct number is [phone] ext [phone]  Per Steven Garay he will call tomorrow to discuss clinical update  He was notified that discharge is anticipated for Saturday

## 2021-04-15 LAB
ANION GAP SERPL CALCULATED.3IONS-SCNC: 3 MMOL/L (ref 5–14)
BUN SERPL-MCNC: 15 MG/DL (ref 5–25)
CALCIUM SERPL-MCNC: 9.9 MG/DL (ref 8.4–10.2)
CHLORIDE SERPL-SCNC: 102 MMOL/L (ref 97–108)
CO2 SERPL-SCNC: 31 MMOL/L (ref 22–30)
CREAT SERPL-MCNC: 0.59 MG/DL (ref 0.6–1.2)
ERYTHROCYTE [DISTWIDTH] IN BLOOD BY AUTOMATED COUNT: 13.7 %
FERRITIN SERPL-MCNC: 42 NG/ML (ref 8–388)
GFR SERPL CREATININE-BSD FRML MDRD: 103 ML/MIN/1.73SQ M
GLUCOSE SERPL-MCNC: 96 MG/DL (ref 70–99)
HCT VFR BLD AUTO: 33.9 % (ref 36–46)
HGB BLD-MCNC: 11.4 G/DL (ref 12–16)
IRON SATN MFR SERPL: 18 %
IRON SERPL-MCNC: 60 UG/DL (ref 50–170)
MCH RBC QN AUTO: 26.3 PG (ref 26–34)
MCHC RBC AUTO-ENTMCNC: 33.8 G/DL (ref 31–36)
MCV RBC AUTO: 78 FL (ref 80–100)
PLATELET # BLD AUTO: 214 THOUSANDS/UL (ref 150–450)
PMV BLD AUTO: 9 FL (ref 8.9–12.7)
POTASSIUM SERPL-SCNC: 4.2 MMOL/L (ref 3.6–5)
RBC # BLD AUTO: 4.35 MILLION/UL (ref 4–5.2)
SODIUM SERPL-SCNC: 136 MMOL/L (ref 137–147)
TIBC SERPL-MCNC: 326 UG/DL (ref 250–450)
WBC # BLD AUTO: 5 THOUSAND/UL (ref 4.5–11)

## 2021-04-15 PROCEDURE — 80048 BASIC METABOLIC PNL TOTAL CA: CPT | Performed by: NURSE PRACTITIONER

## 2021-04-15 PROCEDURE — 82728 ASSAY OF FERRITIN: CPT | Performed by: NURSE PRACTITIONER

## 2021-04-15 PROCEDURE — 85027 COMPLETE CBC AUTOMATED: CPT | Performed by: NURSE PRACTITIONER

## 2021-04-15 PROCEDURE — 99232 SBSQ HOSP IP/OBS MODERATE 35: CPT | Performed by: INTERNAL MEDICINE

## 2021-04-15 PROCEDURE — 97110 THERAPEUTIC EXERCISES: CPT

## 2021-04-15 PROCEDURE — 97542 WHEELCHAIR MNGMENT TRAINING: CPT

## 2021-04-15 PROCEDURE — 97530 THERAPEUTIC ACTIVITIES: CPT

## 2021-04-15 PROCEDURE — 99233 SBSQ HOSP IP/OBS HIGH 50: CPT | Performed by: PHYSICAL MEDICINE & REHABILITATION

## 2021-04-15 PROCEDURE — 97535 SELF CARE MNGMENT TRAINING: CPT

## 2021-04-15 PROCEDURE — 83550 IRON BINDING TEST: CPT | Performed by: NURSE PRACTITIONER

## 2021-04-15 PROCEDURE — 83540 ASSAY OF IRON: CPT | Performed by: NURSE PRACTITIONER

## 2021-04-15 RX ORDER — FERROUS SULFATE 325(65) MG
325 TABLET ORAL
Status: DISCONTINUED | OUTPATIENT
Start: 2021-04-16 | End: 2021-04-17 | Stop reason: HOSPADM

## 2021-04-15 RX ORDER — ASCORBIC ACID 500 MG
500 TABLET ORAL DAILY
Status: DISCONTINUED | OUTPATIENT
Start: 2021-04-16 | End: 2021-04-17 | Stop reason: HOSPADM

## 2021-04-15 RX ADMIN — OXYCODONE HYDROCHLORIDE 5 MG: 5 TABLET ORAL at 18:32

## 2021-04-15 RX ADMIN — ENOXAPARIN SODIUM 40 MG: 40 INJECTION SUBCUTANEOUS at 08:22

## 2021-04-15 RX ADMIN — LEVOTHYROXINE SODIUM 175 MCG: 0.15 TABLET ORAL at 06:04

## 2021-04-15 RX ADMIN — PANTOPRAZOLE SODIUM 20 MG: 20 TABLET, DELAYED RELEASE ORAL at 06:04

## 2021-04-15 RX ADMIN — DOCUSATE SODIUM 100 MG: 100 CAPSULE ORAL at 08:22

## 2021-04-15 RX ADMIN — DOCUSATE SODIUM 100 MG: 100 CAPSULE ORAL at 18:32

## 2021-04-15 NOTE — PROGRESS NOTES
04/15/21 0830   Pain Assessment   Pain Assessment Tool Pain Assessment not indicated - pt denies pain   Pain Score No Pain   Restrictions/Precautions   Precautions Fall Risk   Weight Bearing Restrictions Yes   LUE Weight Bearing Per Order NWB   RLE Weight Bearing Per Order NWB   Braces or Orthoses LE Immobilizer  (HKB locked in ext)   Sit to Stand   Type of Assistance Needed Independent; Adaptive equipment   Amount of Physical Assistance Provided No physical assistance   Comment Cole with use of PRW   Sit to Stand CARE Score 6   Bed-Chair Transfer   Type of Assistance Needed Independent; Adaptive equipment   Amount of Physical Assistance Provided No physical assistance   Comment Cole with use of PRW pt with G safety while performing transfer  pt able to safely position table, perform sit<>stand form wc and transfer to recliner with no LOB and G safety   Chair/Bed-to-Chair Transfer CARE Score 6   Transfer Bed/Chair/Wheelchair   Positioning Concerns Other  (NWB)   Limitations Noted In LE Strength;UE Strength   Adaptive Equipment Platform;Roller [address]; Adaptive equipment   Amount of Physical Assistance Provided No physical assistance   Comment pt able to perform sit><stand with use of PRW and lean to L side while maintaining LUE NWB and use RUE to perform hygiene and CM   Toileting Hygiene CARE Score 6   Toileting   Able to [address] down yes, up yes  Able to Manage Clothing Closures Yes   Manage Hygiene Bladder   Limitations Noted In Balance;ROM;UE Strength;LE Strength   Adaptive Equipment Grab Bar   Toilet Transfer   Type of Assistance Needed Independent; Adaptive equipment   Amount of Physical Assistance Provided No physical assistance   Comment pt able to compelte either SPT with use of PRW and with use of GB at Cole level with G safety and no lOB observed   Pt demosntrating to therapist this session that dtr has [address] installed in bathroom to ensure safety   Toilet Transfer CARE Score 6   Toilet Transfer   Surface Assessed Standard Commode   Transfer Technique Stand Pivot   Limitations Noted In Balance; Endurance;UE Strength;LE Strength   Adaptive Equipment Grab Bar   Commmunity Re-entry   Community Re-entry Level Wheelchair   Community Re-entry Level of Assistance Modified independent   Community Re-entry pt with G safety with wc mobility/mngmt [facility] caf[facility]ia  Pt taking her time and able to manage [facility]   Functional Standing Tolerance   Activity unilateral release   Comments therapist engaged pt in ball toss x2 to challenge BREEZY, endurance, strength, coord and weight shifting for inc participation in ADL tasks  Pt toelrated well withr est break in between   Therapeutic Excerise-Strength   UE Strength Yes   Right Upper Extremity- Strength   R Shoulder Flexion; Extension;Horizontal ABduction   R Elbow Elbow flexion;Elbow extension   R Position Seated   Equipment Dumbbell  (5#)   R Weight/Reps/Sets 2x15   RUE Strength Comment therapist upgraded RUE # to 5# to cont to inc fx strength for improved sit><stand and SPT  Pt toerlated well withr est break in between  Pt reporting slightly more difficult than the 4# form the previous day   Left Upper Extremity-Strength   LUE Strength Comment NA NWB   Cognition   Overall Cognitive Status WFL   Arousal/Participation Alert; Cooperative   Attention Within functional limits   Orientation Level Oriented X4   Memory Within functional limits   Following Commands Follows all commands and directions without difficulty   Activity Tolerance   Activity Tolerance Patient tolerated treatment well   Assessment   Treatment Assessment Pt engaged in 90mins of skilled OT services with focus on transfers, toietling, RUE strengthening, standing balance, wc mobility  Therapist assessed pt in room this session  Pt progressed to IRP Cole with use of wc and PRW  Pt able to complete all transfers with use of PRW   Pt reporting that GB have been in stalled in BR  Pt with anticiapted dc on 4/17/21 with dtr support  Pt will be overall Cole for ADLs, will req A for knee immbo mngmnt only  Which pts dtr can A in the AM prior to her leaving for work  Cont OT POC with focus on final ADL prior to DC, RUE strengthening with HEP, dynamic balance, activity tolerace to inc overall fx performance and independence  Prognosis Good   Problem List Decreased strength;Decreased endurance; Impaired balance;Orthopedic restrictions   Barriers to Discharge Inaccessible home environment   Plan   Treatment/Interventions ADL retraining;Functional transfer training; Therapeutic exercise; Endurance training;Patient/family training;Bed mobility; Compensatory technique education   Progress Progressing toward goals   Recommendation   OT Discharge Recommendation No rehabilitation needs  (no home oT req at this time)   Equipment Recommended Reacher ($);Bedside commode  (leg , GB)   OT Equipment ordered dtr has purchased reacher, leg lfter, and BSC   OT - OK to Discharge Yes   Discharge Summary 4/17/21   OT Therapy Minutes   OT Time In 0830   OT Time Out 1000   OT Total Time (minutes) 90   OT Mode of treatment - Individual (minutes) 90   OT Mode of treatment - Concurrent (minutes) 0   OT Mode of treatment - Group (minutes) 0   OT Mode of treatment - Co-treat (minutes) 0   OT Mode of Treatment - Total time(minutes) 90 minutes   OT Cumulative Minutes 780   Therapy Time missed   Time missed?  No

## 2021-04-15 NOTE — ASSESSMENT & PLAN NOTE
4/3 - R tibial plateau fracture and L nondisplaced wrist fracture s/p mechanical fall   + Head strike, LOC  CT head - no acute abnormalities  L wrist NWB, Sugar tong splint  Platform walker on L - allowed to bear weight on L elbow  Pain management  Follow-up with Ortho (Dr Nayana Leon) with repeat x-rays  (4/12)    X-ray on 4/12: Nondisplaced distal radius fracture is not well visualized  Alignment is near anatomic  PT/OT Therapies  Primary team following

## 2021-04-15 NOTE — PROGRESS NOTES
[address] [facility]  Progress Note Page Pin 1965, 63 y o  female MRN: [id number]  Unit/Bed#: -73 Encounter: [id number]  Primary Care Provider: Bj Linda MD   Date and time admitted to hospital: 4/7/2021  4:05 PM    Anemia  Assessment & Plan  Hgb currently 11 4 from 12 1  Asymptomatic  Microcytic anemia - Will add-on iron panel and start iron supplementation  Monitor for acute s/s of bleeding  Continue to trend with routine CBC  Angular cheilitis  Assessment & Plan  Noted on 4/8, not itchy or painful  Currently receiving bacitracin ointment  Pt has had in past and resolves with vaseline  Improved  Bacitracin discontinued  Thrombocytopenia (HCC)  Assessment & Plan  Plt count currently 214 from 121 on 4/6  Asymptomatic  Currently on lovenox injections  No acute s/s of bleeding  Resolved  Will continue to trend  Vitamin D deficiency  Assessment & Plan  Last Vitamin D level was 14 in 2019  Vitamin D level 12 4 on 4/8  Started on Vitamin D supplementation, 50,000u weekly  Continue for 6 weeks and follow-up with PCP  Dizziness  Assessment & Plan  Experienced orthostasis in the acute setting that resolved after IV fluids  Monitor BP with routine VS   Encourage fluid intake  Orthostatic BPs obtained  Currently asymptomatic  Obesity  Assessment & Plan  Nutritional counseling as needed  Gastric sleeve surgery 5 years ago  Recently started to gain weight, Endocrine ordered Contrave for patient, but she would prefer nutritional counseling  Has an appointment to follow-up with Weight Management  Left wrist distal radius nondisplaced fracture  Assessment & Plan  4/3 - R tibial plateau fracture and L nondisplaced wrist fracture s/p mechanical fall   + Head strike, LOC  CT head - no acute abnormalities  L wrist NWB, Sugar tong splint  Platform walker on L - allowed to bear weight on L elbow  Pain management    Follow-up with Ortho (  Nicole Montalvo) with repeat x-rays  (4/12)    X-ray on 4/12: Nondisplaced distal radius fracture is not well visualized  Alignment is near anatomic  PT/OT Therapies  Primary team following  GERD (gastroesophageal reflux disease)  Assessment & Plan  Takes Pepcid 20mg daily at home  Non-formulary  Continue with Protonix 20mg daily  Hypothyroidism  Assessment & Plan  Currently taking levothyroxine 175mcg  Dose decreased by Endocrinology in March  Last TSH was 0 14 and T4 was 1 57 on 1/28  Continue to follow-up with Endocrinology as outpatient  TSH 2 190 on 4/13  Continue current levothyroxine dose  * Tibial plateau fracture  Assessment & Plan  4/3 - R tibial plateau fracture and L nondisplaced wrist fracture s/p mechanical fall   + Head strike, LOC  CT head - no acute abnormalities  Pain management  L wrist NWB, Sugar tong splint  RLE NWB, TROM brace locked in extension  Platform walker on L - allowed to bear weight on L elbow  Follow-up with Ortho (Dr Nicole Montalvo) with repeat x-rays  (4/12)    X-ray on 4/12 - "Nondisplaced fracture of the proximal tibial metaphysis with intra-articular extension along the lateral tibial eminence, unchanged "    PT/OT Therapies  Primary team following  VTE Pharmacologic Prophylaxis:   Pharmacologic: Enoxaparin (Lovenox)  Mechanical VTE Prophylaxis in Place: Yes - sequential compression devices  Current Length of Stay: 8 day(s)    Current Patient Status: Inpatient Rehab     Discharge Plan: As per primary team     Code Status: Level 1 - Full Code    Subjective:   Pt examined while sitting in WC in therapy gym  Complaints of RLE and L wrist pain, 3/10, aching/throbbing, improves with rest   States that the splint readjustment makes her wrist feel more supported but she does still have pain in her wrist   Concerns about giving herself Lovenox injections    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, asked if she would be able to have her daughter give the injections but pt is worried that her daughter is too busy  Will check in with the daughter later today when she is here and see if she feels comfortable  If not, will reach out to Ortho and see if any other DVT prophylaxis can be used  Otherwise pt has no other complaints  Had 2 BMs today already with no issues  Objective:     Vitals:   Temp (24hrs), Av 5 °F (36 9 °C), Min:97 6 °F (36 4 °C), Max:99 °F (37 2 °C)    Temp:  [97 6 °F (36 4 °C)-99 °F (37 2 °C)] 97 6 °F (36 4 °C)  HR:  [75-86] 75  Resp:  [18] 18  BP: (105-124)/(52-61) 124/61  SpO2:  [96 %-98 %] 98 %  Body mass index is 37 33 kg/m²  Review of Systems   Constitutional: Negative for chills, fatigue and fever  Respiratory: Negative for cough and shortness of breath  Cardiovascular: Negative for chest pain, palpitations and leg swelling  Gastrointestinal: Negative for abdominal distention, abdominal pain, constipation, diarrhea, nausea and vomiting  LBM 4/15   Genitourinary: Negative for difficulty urinating  Musculoskeletal: Positive for arthralgias (RLE and L wrist pain, 3/10, aching, improves with pain medication)  Neurological: Negative for dizziness and light-headedness  Psychiatric/Behavioral: Negative for sleep disturbance  Input and Output Summary (last 24 hours): Intake/Output Summary (Last 24 hours) at 4/15/2021 1021  Last data filed at 2021 1248  Gross per 24 hour   Intake 240 ml   Output --   Net 240 ml       Physical Exam:     Physical Exam  Vitals signs and nursing note reviewed  Constitutional:       Appearance: Normal appearance  She is obese  HENT:      Head: Normocephalic and atraumatic  Cardiovascular:      Rate and Rhythm: Normal rate and regular rhythm  Pulses: Normal pulses  Heart sounds: Murmur present  Systolic murmur present with a grade of 1/6  No friction rub     Pulmonary:      Effort: Pulmonary effort is normal  No respiratory distress  Breath sounds: Normal breath sounds  No wheezing or rhonchi  Abdominal:      General: Abdomen is flat  Bowel sounds are normal  There is no distension  Palpations: Abdomen is soft  Tenderness: There is no abdominal tenderness  Musculoskeletal:      Right lower leg: No edema  Left lower leg: No edema  Comments: L wrist splint and RLE immobilizer in place  Skin:     General: Skin is warm and dry  Capillary Refill: Capillary refill takes less than 2 seconds  Neurological:      Mental Status: She is alert and oriented to person, place, and time     Psychiatric:         Mood and Affect: Mood normal          Behavior: Behavior normal          Additional Data:     Labs:    Results from last 7 days   Lab Units 04/15/21  0604 04/12/21  0640   WBC Thousand/uL 5 00 4 20*   HEMOGLOBIN g/dL 11 4* 11 5*   HEMATOCRIT % 33 9* 34 4*   PLATELETS Thousands/uL 214 170   NEUTROS PCT %  --  53   LYMPHS PCT %  --  33   MONOS PCT %  --  11*   EOS PCT %  --  3     Results from last 7 days   Lab Units 04/15/21  0604   SODIUM mmol/L 136*   POTASSIUM mmol/L 4 2   CHLORIDE mmol/L 102   CO2 mmol/L 31*   BUN mg/dL 15   CREATININE mg/dL 0 59*   ANION GAP mmol/L 3*   CALCIUM mg/dL 9 9   GLUCOSE RANDOM mg/dL 96                       Labs reviewed    Imaging:    Imaging reviewed    Recent Cultures (last 7 days):           Last 24 Hours Medication List:   Current Facility-Administered Medications   Medication Dose Route Frequency Provider Last Rate    acetaminophen  650 mg Oral Q6H PRN Brennen Correia MD      aluminum-magnesium hydroxide-simethicone  30 mL Oral Q6H PRN Brennen Correia MD      [START ON 4/16/2021] ascorbic acid  500 mg Oral Daily ELEUTERIO Nova      bisacodyl  10 mg Rectal Daily PRN Brennen Correia MD      calcium carbonate  1,000 mg Oral Daily PRN Brennen Correia MD      cyclobenzaprine  5 mg Oral TID PRN Brennen Correia MD      docusate sodium  100 mg Oral BID Celesta Raspberry Rose Robb MD      enoxaparin  40 mg Subcutaneous Q24H [facility] Michel Spencer MD      ergocalciferol  50,000 Units Oral Weekly ELEUTERIO Cooper      [START ON 4/16/2021] ferrous sulfate  325 mg Oral Daily With Breakfast ELEUTERIO Cooper      levothyroxine  175 mcg Oral Early Morning Michel Spencer MD      ondansetron  4 mg Oral Q6H PRN Michel Spencer MD      oxyCODONE  2 5 mg Oral Q4H PRN Michel Spencer MD      oxyCODONE  5 mg Oral Q4H PRN Michel Spencer MD      pantoprazole  20 mg Oral Early Morning Michel Spencer MD      polyethylene glycol  17 g Oral Daily PRN Michel Spencer MD      simethicone  80 mg Oral 4x Daily PRN Michel Spencer MD          M*Modal software was used to dictate this note  It may contain errors with dictating incorrect words or incorrect spelling  Please contact the provider directly with any questions

## 2021-04-15 NOTE — PCC CARE MANAGEMENT
4/8/2021  Patient presents to [facility] after a fall resulting in R tibial plateau and L radius fractures  Met with the patient to discuss the rehab process including therapy program, team meetings, communication, estimated LOS and role of CM  She is very motivated to participate in the rehab program so she can get home safely  She lives alone but will be staying at her daughter, fina Bah due to accessibility  Her daughter works during the day as an Occupational Therapist so the patient will be alone during those times  The patient stated she does not have any DME, is not setup with community resources and has no experience with home or outpatient therapies  She uses [facility] and did state that she would like to have her initial scripts filled with [facility]  CM will follow patient for care coordation and discharge planning/ facilitation  4/15/2021  Patient is making good progress and participating well  Her daughter, Vero Varela, is a good support for the patient and will be assisting to facilitate her transition home  DME submitted for WC and platform attachment  Her daughter will be getting RW on her own  [facility]  CM will follow patient for care coordation and discharge planning/ facilitation

## 2021-04-15 NOTE — ASSESSMENT & PLAN NOTE
4/3 - R tibial plateau fracture and L nondisplaced wrist fracture s/p mechanical fall   + Head strike, LOC  CT head - no acute abnormalities  Pain management  L wrist NWB, Sugar tong splint  RLE NWB, TROM brace locked in extension  Platform walker on L - allowed to bear weight on L elbow  Follow-up with Ortho (Dr Nayana Leon) with repeat x-rays  (4/12)    X-ray on 4/12 - "Nondisplaced fracture of the proximal tibial metaphysis with intra-articular extension along the lateral tibial eminence, unchanged "    PT/OT Therapies  Primary team following

## 2021-04-15 NOTE — PROGRESS NOTES
PM&R PROGRESS NOTE:  Brianna Oakley 63 y o  female MRN: [id number]  Unit/Bed#: -01 Encounter: [id number]        Rehab Diagnosis: Impairment of mobility, safety and Activities of Daily Living (ADLs) due to Orthopedic Disorders:  08 9 Other Orthopedic Right tibial plateu fracture, left radius fracture    HPI: Brianna Oakley is a 63 y o  female with hypothyroidism, GERD, gastric sleeve, who presented to the [address] on 4/3/21 status post traumatic fall upon exiting her car  Sustained a right tibial plateau comminuted nondisplaced fracture, left least distal radius fracture  Evaluated by Orthopedics and treated nonsurgically  Deemed nonweightbearing right lower extremity as well as left wrist (allowed to use platform walker at left elbow)  Placed in a hinged knee brace locked in extension and a sugar-tong splint for the left upper extremity  Patient will require repeat imaging in 1 week  Medically, patient was found to have some orthostasis which was symptomatic and IV fluids  Patient with mild anemia and thrombocytopenia which was followed  Patient also treated for acute nociceptive pain  Patient found to have acute functional her baseline given her nonweightbearing status, and after clearance and stabilization was admitted to [facility] for acute inpatient rehabilitation  SUBJECTIVE:  Patient seen face to face  No acute issues overnight  Feeling well without much pain  Orthopedics did redress her LUE splint  ASSESSMENT: Stable, progressing      PLAN:    Rehabilitation   Functional deficits: right lower extremity weakness, left upper extremity weakness, decreased endurance, impaired mobility, self care    Continue current rehabilitation plan of care to maximize function  I personally attended, reviewed, and discussed medical and functional updates in team conference today    Has in room privileges starting today  Please refer to advance care planning note for details  [facility] Estimated Discharge:  Discharge home Saturday 4/17/21 with home care PT, OT    Physical Therapy Occupational Therapy Speech Therapy   Weight Bearing Status: Non-weight bearing(NWB L UE and R LE)  Transfers: Independent, Supervision(S for STS and bed <>chair)  Bed Mobility: Independent(uses leg  for R LE)  Amulation Distance (ft): 5 feet(hopping on LLE with L platform RW)  Ambulation: Incidental Touching  Assistive Device for Ambulation: Roller Walker  Roller Walker Attachments: With platform on left  Wheelchair Mobility Distance: 250 ft  Wheelchair Mobility: Supervision, Independent  Ramp: Minimal Assistance  Assistive Device for Ramp: Wheelchair  Discharge Recommendations: Home with:  [address] with[de-identified] Home Physical Therapy, Family Support   Eating: (setup)  Grooming: Supervision  Bathing: Incidental Touching  Bathing: Incidental Touching  Upper Body Dressing: Supervision  Lower Body Dressing: Incidental Touching  Toileting: Incidental Touching  Tub/Shower Transfer: (NA Sb)  Toilet Transfer: Incidental Touching  Cognition: Within Defined Limits  Orientation: Person, Place, Time, Situation                   DVT prophylaxis  · Managed on subcutaneous Lovenox - Patient started education on self injections but very fearful, therefore daughter to get education for home; if daughter unable to inject, consider Eliquis for home if Ortho allows        Pain  · Managed on p r n  Tylenol, oxycodone IR 2 5 mg-5 mg every 4 hours p r n  for moderate to severe pain  · P r n  Flexeril available for muscular spasms  · Continue topical ice and elevation of limb     Bladder plan  · Continent     Bowel plan  · Continent  · Last BM 4/15/21      * Tibial plateau fracture  Assessment & Plan  · Sustained after traumatic fall on 4/3/21    · Treated non-operatively by Orthopedics  · Deemed NWB RLE placed in a hinged knee brace locked in extension  · Follow-up x-ray completed 4/12/21 - reviewed by Ortho - continue current plan of care  · Follow-up with Orthopedics as outpatient     Left wrist distal radius nondisplaced fracture  Assessment & Plan  · Sustained after traumatic fall on 4/3/21  · Treated non-operatively by Orthopedics  · Deemed NWB Left wrist - but allowed platform walker at left elbow  · Placed in a sugar-tong splint  · Follow-up x-ray comleted 4/12/21 - reviewed by Ortho - splint was re-wrapped 4/13/21 with improvement in discomfort  Patient needs outpatient appointment next week  · Follow-up with Orthopedics as outpatient  Anemia  Assessment & Plan  · Mild  · Asymptomatic  · Will follow    History of sleeve gastrectomy  Assessment & Plan  · S/p laparascopic sleeve gastrectomy and,[facility]olecyst[facility]      Thrombocytopenia ([address] )  Assessment & Plan  · Resolved    Dizziness  Assessment & Plan  · Resolved  · Was related to mild orthostasis recovered with IV hydration  · In ARC, if needed will use TEDS and abdominal binder    GERD (gastroesophageal reflux disease)  Assessment & Plan  · Managed on Protonix (therapeutic exchange for Omeprazole)  · PRN tums and mylanta    Hypothyroidism  Assessment & Plan  · Managed on Synthroid 175mg daily   · Repeat Thyroid labs in 4-6 weeks     Appreciate IM consultants medical co-management  Labs, medications, and imaging personally reviewed  ROS:  A ten point review of systems was completed on 4/15/21 and pertinent positives are listed in subjective section  All other systems reviewed were negative  OBJECTIVE:   /61 (BP Location: Right arm)   Pulse 75   Temp 97 6 °F (36 4 °C) (Tympanic)   Resp 18   Ht 5' 5" (1 651 m)   Wt 102 kg (224 lb 4 8 oz) Comment: with leg immobilizer  LMP 10/24/2016   SpO2 98%   BMI 37 33 kg/m²       Physical Exam  Vitals signs and nursing note reviewed  Constitutional:       General: She is not in acute distress  Appearance: She is well-developed  HENT:      Head: Normocephalic        Nose: Nose normal    Eyes:      Conjunctiva/sclera: Conjunctivae normal    Neck:      Musculoskeletal: Neck supple  Cardiovascular:      Rate and Rhythm: Normal rate  Pulmonary:      Effort: Pulmonary effort is normal       Breath sounds: No wheezing  Abdominal:      General: There is no distension  Palpations: Abdomen is soft  Musculoskeletal:      Comments: Hinged knee brace in place - improved swelling in knee  Splint LUE in place - fingers with good mobility   Skin:     General: Skin is warm  Neurological:      Mental Status: She is alert and oriented to person, place, and time     Psychiatric:         Mood and Affect: Mood normal           Lab Results   Component Value Date    WBC 5 00 04/15/2021    HGB 11 4 (L) 04/15/2021    HCT 33 9 (L) 04/15/2021    MCV 78 (L) 04/15/2021     04/15/2021     Lab Results   Component Value Date    SODIUM 136 (L) 04/15/2021    K 4 2 04/15/2021     04/15/2021    CO2 31 (H) 04/15/2021    BUN 15 04/15/2021    CREATININE 0 59 (L) 04/15/2021    GLUC 96 04/15/2021    CALCIUM 9 9 04/15/2021     No results found for: INR, PROTIME        Current Facility-Administered Medications:     acetaminophen (TYLENOL) tablet 650 mg, 650 mg, Oral, Q6H PRN, Kit Alaniz MD, 650 mg at 04/09/21 0223    aluminum-magnesium hydroxide-simethicone (MYLANTA) oral suspension 30 mL, 30 mL, Oral, Q6H PRN, Kit Alaniz MD  Maureen Swain  [START ON 4/16/2021] ascorbic acid (VITAMIN C) tablet 500 mg, 500 mg, Oral, Daily, ELEUTERIO Dahl    bisacodyl (DULCOLAX) rectal suppository 10 mg, 10 mg, Rectal, Daily PRN, Kit Alaniz MD    calcium carbonate (TUMS) chewable tablet 1,000 mg, 1,000 mg, Oral, Daily PRN, Kit Alaniz MD    cyclobenzaprine (FLEXERIL) tablet 5 mg, 5 mg, Oral, TID PRN, Kit Alaniz MD    docusate sodium (COLACE) capsule 100 mg, 100 mg, Oral, BID, Kit Alaniz MD, 100 mg at 04/15/21 2912    enoxaparin (LOVENOX) subcutaneous injection 40 mg, 40 mg, Subcutaneous, Q24H [address], Nevaeh Kapadia MD, 40 mg at 04/15/21 3299    ergocalciferol (VITAMIN D2) capsule 50,000 Units, 50,000 Units, Oral, Weekly, ELEUTERIO Estrada, 50,000 Units at 04/09/21 0827    [START ON 4/16/2021] ferrous sulfate tablet 325 mg, 325 mg, Oral, Daily With Breakfast, JULIEN EstradaNP    levothyroxine tablet 175 mcg, 175 mcg, Oral, Early Morning, Nevaeh Kapadia MD, 175 mcg at 04/15/21 0604    ondansetron (ZOFRAN-ODT) dispersible tablet 4 mg, 4 mg, Oral, Q6H PRN, Nevaeh Kapadia MD    oxyCODONE (ROXICODONE) IR tablet 2 5 mg, 2 5 mg, Oral, Q4H PRN, Nevaeh Kapadia MD, 2 5 mg at 04/13/21 1521    oxyCODONE (ROXICODONE) IR tablet 5 mg, 5 mg, Oral, Q4H PRN, Nevaeh Kapadia MD, 5 mg at 04/14/21 0925    pantoprazole (PROTONIX) EC tablet 20 mg, 20 mg, Oral, Early Morning, Nevaeh Kapadia MD, 20 mg at 04/15/21 0604    polyethylene glycol (MIRALAX) packet 17 g, 17 g, Oral, Daily PRN, Nevaeh Kapadia MD    simethicone (MYLICON) chewable tablet 80 mg, 80 mg, Oral, 4x Daily PRN, Nevaeh Kapadia MD    Past Medical History:   Diagnosis Date    Disease of thyroid gland        Patient Active Problem List    Diagnosis Date Noted    Tibial plateau fracture 21/13/7954     Priority: High    Left wrist distal radius nondisplaced fracture 04/03/2021     Priority: Medium    Impaired mobility and activities of daily living 04/13/2021    Angular cheilitis 04/08/2021    History of sleeve gastrectomy 04/08/2021    Anemia 04/08/2021    Vitamin D deficiency 04/07/2021    Thrombocytopenia ([address] ) 04/07/2021    Dizziness 04/05/2021    Ambulatory dysfunction 04/03/2021    Hypothyroidism 04/03/2021    GERD (gastroesophageal reflux disease) 04/03/2021    Obesity 04/03/2021          Nevaeh Kapadia MD    Total time spent:  45 minutes with more than 50% spent counseling/coordinating care  Counseling includes discussion with patient re: progress and discussion with patient of his/her current medical/functional state/information   Coordination of patient's care was performed in conjunction with consulting services  Time invested included review of patient's labs, vitals, and management of their comorbidities with continued monitoring  The care of the patient was extensively discussed and appropriate treatment plan was formulated unique for this patient  ** Please Note:  voice to text software may have been used in the creation of this document   Although proof errors in transcription or interpretation are a potential of such software**

## 2021-04-15 NOTE — PROGRESS NOTES
04/15/21 1515   Pain Assessment   Pain Assessment Tool Pain Assessment not indicated - pt denies pain   Pain Score No Pain   Restrictions/Precautions   Precautions Fall Risk   LUE Weight Bearing Per Order NWB   RLE Weight Bearing Per Order NWB   Cognition   Overall Cognitive Status WFL   Arousal/Participation Alert; Cooperative   Attention Within functional limits   Orientation Level Oriented X4   Memory Within functional limits   Following Commands Follows all commands and directions without difficulty   Subjective   Subjective "Im ready" pt denies concern for DC home   Sit to Stand   Type of Assistance Needed Independent   Sit to Stand CARE Score 6   Bed-Chair Transfer   Type of Assistance Needed Independent   Chair/Bed-to-Chair Transfer CARE Score 6   Car Transfer   Type of Assistance Needed Supervision;Verbal cues   Amount of Physical Assistance Provided No physical assistance   Car Transfer CARE Score 4   Ambulation   Does the patient walk? 0  No, and walking goal is not clinically indicated  Assessment   Treatment Assessment Pt and daughter Kamla Paige engaged in 30 min family training for car transfer trial and education on HKB  Daughter brought in [place] which will differ from the  we will get her PF from, it will not fit  Daughter will bring in a different walker tomorrow for PF fitting  Time spent focusing on brace education and mgmt, education for properpositioning, and lock mechanism, as well as brackets and velcro tightening  Daughter verbalizes understanding  Assisted pt out to [facility] for car transfers trial, No difficulty with seat lowest and reclined  Use of grab handle above head for support  DME arrived, will setup pt with her WC and RW next day  Plan for DC to [facility] saturday with Home PT followup  Daughter will return tomorrow late afternoon for lovenox education and to deliver RW, RN aware      PT Therapy Minutes   PT Time In 8746   PT Time Out 1545   PT Total Time (minutes) 30   PT Mode of treatment - Individual (minutes) 30   PT Mode of treatment - Concurrent (minutes) 0   PT Mode of treatment - Group (minutes) 0   PT Mode of treatment - Co-treat (minutes) 0   PT Mode of Treatment - Total time(minutes) 30 minutes   PT Cumulative Minutes 630   Therapy Time missed   Time missed?  No

## 2021-04-15 NOTE — ASSESSMENT & PLAN NOTE
Hgb currently 11 4 from 12 1  Asymptomatic  Microcytic anemia - Will add-on iron panel and start iron supplementation  Monitor for acute s/s of bleeding  Continue to trend with routine CBC

## 2021-04-15 NOTE — TEAM CONFERENCE
Acute RehabilitationTeam Conference Note  Date: 4/15/2021   Time: 12:33 PM       Patient Name:  Kt Portland       Medical Record Number: [id number]   YOB: 1965  Sex: Female          Room/Bed:  [facility] 264/[facility] 264-01  Payor Info:  Payor: Jorge Mota / Plan: [employer] / Product Type: Blue Fee for Service /      Admitting Diagnosis: Tibial plateau fracture [[id number]]   Admit Date/Time:  4/7/2021  4:05 PM  Admission Comments: No comment available     Primary Diagnosis:  Tibial plateau fracture  Principal Problem: Tibial plateau fracture    Patient Active Problem List    Diagnosis Date Noted    Impaired mobility and activities of daily living 04/13/2021    Angular cheilitis 04/08/2021    History of sleeve gastrectomy 04/08/2021    Anemia 04/08/2021    Vitamin D deficiency 04/07/2021    Th[address] [address] ) 04/07/2021    Dizziness 04/05/2021    Ambulatory dysfunction 04/03/2021    Tibial plateau fracture 99/09/9108    Hypothyroidism 04/03/2021    GERD (gastroesophageal reflux disease) 04/03/2021    Left wrist distal radius nondisplaced fracture 04/03/2021    Obesity 04/03/2021       Physical Therapy:    Weight Bearing Status: Non-weight bearing(NWB L UE and R LE)  Transfers: Independent, Supervision(S for STS and bed <>chair)  Bed Mobility: Independent(uses leg  for R LE)  Amulation Distance (ft): 5 feet(hopping on LLE with L platform RW)  Ambulation: Incidental Touching  Assistive Device for Ambulation: Roller Walker  Roller Walker Attachments:  With platform on left  Wheelchair Mobility Distance: 250 ft  Wheelchair Mobility: Supervision, Independent  Ramp: Minimal Assistance  Assistive Device for Ramp: Wheelchair  Discharge Recommendations: Home with:  [address] with[de-identified] Home Physical Therapy, Family Support    4 8 21 Pt admitted yesterday to have PT eval this PM      04/14/2021  Pt daughter will be in for FT tomorrow afternoon and plan to focus on car transfers and education on brace management  Pt has achieved I goals for wc mobility at this time, and continues to progress towards I with all transfers with assist of leg lift for RLE  Pt is S for sit to stand and bed <> chair transfers  Plan to continue focus on transfers, wc mobility, and strengthing next session in order to improve overall independence and functional mobility to prepare for anticipated dc on 4/17, pending ortho input  Recommend DC to daughters at [facility] level and left platform RWfor short distance ambulation for bathroom access  Ordered walker and wc for dc  Home PT recommended for followup  Occupational Therapy:  Eating: (setup)  Grooming: Supervision  Bathing: Incidental Touching  Bathing: Incidental Touching  Upper Body Dressing: Supervision  Lower Body Dressing: Incidental Touching  Toileting: Incidental Touching  Tub/Shower Transfer: (NA Sb)  Toilet Transfer: Incidental Touching  Cognition: Within Defined Limits  Orientation: Person, Place, Time, Situation  Discharge Recommendations: Home with:  [address] with[de-identified] Family Support, First Floor Setup       ADL: UB A for bra clasp, CGA for LB dressing including CM  TRANSFER: CS SPT with PRW  D/C PLAN: anticipate DC  Plan is for pt to dc to dtrs home with FF setup  Pt reports dtr is and OT where she will A pt with ADLs in the AM prior to leaving for work  Pt is making steady gains and is anticipated to achieve Cole for toileting task  Pt barriers include LUE/RLE NWB, dec activity toelrance, dec balance, impacting participation in ADL/IADL's  In order to address fx deficits, skilled OT services have worked on self-care, therapeutic exercise, therapeutic activity,  in order to inc fx performance and independence  Therapist has discussed POC with pt and areas of focus with pt verbalizing understanding  Speech Therapy:           No notes on file    Nursing Notes:  Appetite: Good  Diet Type: Regular/House                           Type of Wound (LDA):  Wound Bladder: 6 - Modified [place]        Bowel: 6 - Modified [place]        Pain Location/Orientation: Orientation: Left, Location: Arm  Pain Score: 2  Pain 2  Pain Score 2: 2  Pain Location/Orientation 2: Orientation: Right, Location: Knee  Hospital Pain Intervention(s) 2: Repositioned, Elevated                    Hospital Pain Intervention(s): Declines     Medication Management/Safety  Injectable: Lovenox     63 y o  female with a PMH of hypothyroidism, GERD, and obesity who originally [facility]21 after a mechanical fall while getting out of the car  The patient hit her head on the curb and had a brief moment of loss of consciousness along with persistent L wrist and R knee pain  X-rays showed acute nondisplaced, nondepressed R tibial plateau fracture and L acute, nondisplaced distal radial metaphysis fracture  CT head showed no acute intracranial abnormality but did show mild left anterior frontal/forehead subgaleal hematoma  Ortho was consulted and had patient placed in sugar tong splint of the L wrist and TROM brace of the R knee that is locked in extension  Patient is to be NWB to L wrist and RLE  Follow-up with Dr Saida Anne in 1 week as outpatient with repeat x-rays  Admitted to [address] [place] [address]  4/7/21  GERD managed with protonix  Hypothyroidism managed with levothyroxine  Pain managed with tylenol & Oxy IR  Ppx: Lovenox  This week we will monitor pt's lab results & vital signs  Continent of bladder & bowel  Pt is Min A with platform walker to wheelchair  Pt will have safe transfers with use of call bell & hourly rounding to prevent falls  Pt will have adequate pain relief to fully participate in therapies  Pt will understand importance of T/R & off loading weight to prevent pressure injury   We will encourage independence with ADL's         Case Management:     Discharge Planning  Living Arrangements: Children  Support Systems: Children  Assistance Needed: tbd  Type of Current Residence: Private residence  [address]: No  4/8/2021  Patient presents to [facility] ER after a fall resulting in R tibial plateau and L radius fractures  Met with the patient to discuss the rehab process including therapy program, team meetings, communication, estimated LOS and role of CM  She is very motivated to participate in the rehab program so she can get home safely  She lives alone but will be staying at her daughter, Cam Hughes, house due to accessibility  Her daughter works during the day as an Occupational Therapist so the patient will be alone during those times  The patient stated she does not have any DME, is not setup with community resources and has no experience with home or outpatient therapies  She uses [facility] and did state that she would like to have her initial scripts filled with [facility]  CM will follow patient for care coordation and discharge planning/ facilitation  4/15/2021  Patient is making good progress and participating well  Her daughter, Jose Eduardo Manning, is a good support for the patient and will be assisting to facilitate her transition home  DME submitted for WC and platform attachment  Her daughter will be getting RW on her own  [facility]  CM will follow patient for care coordation and discharge planning/ facilitation  Is the patient actively participating in therapies? yes  List any modifications to the treatment plan: No    Barriers Interventions   Will need to inject lovenox Completing education and training with patient and daughter   NWB Adaptive equipment and compensatory strategies                  Is the patient making expected progress toward goals?  yes  List any update or changes to goals: None    Medical Goals: Patient will be medically stable for discharge to [facility] env[facility]ment upon completion of rehab program and Patient will be able to manage medical conditions and comorbid conditions with medications and follow up upon completion of rehab program    Weekly Team Goals:   Rehab Team Goals  ADL Team Goal: Patient will require assist with ADLs with least restrictive device upon completion of rehab program  Bowel/Bladder Team Goal: Patient will return to premorbid level for bladder/bowel management upon completion of rehab program  Transfer Team Goal: Patient will be independent with transfers with least restrictive device upon completion of rehab program  Locomotion Team Goal: Patient will be independent with locomotion with least restrictive device upon completion of rehab program(WC primary  )    Discussion: Making good progress and participating well  Ceciessie Lovelyn is supportive and has been present for FT  She is OT by aman and familiar with how to assist the patient competently  Will complete FT over the next few days to assure safe transition home  Will progress to in room privileges today to assess safety and consistency with transfers and self care  Patient is recommended to continue with home PT which is setup through [address] [place] [address]  Anticipated Discharge Date:  4/17/2021  [facility] Team Members Present: The following team members are supervising care for this patient and were present during this Weekly Team Conference      Physician: Dr Sandy Trevino MD  : Azucena Kasper DPT  Registered Nurse: Bill Eden, RN  Physical Therapist: Aminata Hernandez PT  Occupational Therapist: Lai Antonio MS, OTR/L

## 2021-04-15 NOTE — ASSESSMENT & PLAN NOTE
Plt count currently 214 from 121 on 4/6  Asymptomatic  Currently on lovenox injections  No acute s/s of bleeding  Resolved  Will continue to trend

## 2021-04-15 NOTE — CASE MANAGEMENT
Spoke with Suzanna Carrillo from [employer] at [phone] ext [phone] who confirmed that he received the clinical update sent yesterday but did not review  He stated that he would review it and call back tomorrow with a determination for approval for coverage through discharge on 4/17/2021

## 2021-04-15 NOTE — PROGRESS NOTES
04/15/21 1030   Pain Assessment   Pain Assessment Tool Pain Assessment not indicated - pt denies pain   Pain Score No Pain   Restrictions/Precautions   Precautions Fall Risk   Weight Bearing Restrictions Yes   LUE Weight Bearing Per Order NWB   RLE Weight Bearing Per Order NWB   Braces or Orthoses LE Immobilizer  (R LE  long split LUE)   Cognition   Overall Cognitive Status WFL   Arousal/Participation Alert; Cooperative   Attention Within functional limits   Orientation Level Oriented X4   Memory Within functional limits   Following Commands Follows all commands and directions without difficulty   Subjective   Subjective "I feel better today, I can move my leg up without my leg lift"    Sit to Stand   Type of Assistance Needed Independent; Adaptive equipment   Amount of Physical Assistance Provided No physical assistance   Comment Cole w/ PRW   Sit to Stand CARE Score 6   Bed-Chair Transfer   Type of Assistance Needed Independent; Adaptive equipment   Amount of Physical Assistance Provided No physical assistance   Comment Cole from w/ PRW   Chair/Bed-to-Chair Transfer CARE Score 6   Car Transfer   Reason if not Attempted Environmental limitations   Car Transfer CARE Score 10   Walk 10 Feet   Reason if not Attempted Safety concerns   Walk 10 Feet CARE Score 88   Walk 50 Feet with Two Turns   Reason if not Attempted Safety concerns   Walk 50 Feet with Two Turns CARE Score 88   Walk 150 Feet   Reason if not Attempted Safety concerns   Walk 150 Feet CARE Score 88   Walking 10 Feet on Uneven Surfaces   Reason if not Attempted Safety concerns   Walking 10 Feet on Uneven Surfaces CARE Score 88   Ambulation   Does the patient walk? 0  No, and walking goal is not clinically indicated  Primary Mode of Locomotion Prior to Admission Walk   Distance Walked (feet) 2 ft  (ft)   Assist Device Platform;Roller Walker   Gait Pattern Hopping   Limitations Noted In Endurance; Safety;Strength;Balance   Provided Assistance with: Balance Walk Assist Level Supervision   Findings hopping fwd w/ PRW to chair   Wheel 50 Feet with Two Turns   Type of Assistance Needed Independent   Amount of Physical Assistance Provided No physical assistance   Comment R UE and L LE   Wheel 50 Feet with Two Turns CARE Score 6   Wheel 150 Feet   Type of Assistance Needed Independent   Amount of Physical Assistance Provided No physical assistance   Comment R UE and L LE   Wheel 150 Feet CARE Score 6   Wheelchair mobility   Does the patient use a wheelchair? 1  Yes   Type of Wheelchair Used 1  Manual   Method Right upper extremity; Left lower extremity   Assistance Provided For Curbs  (indoor ramp assistance)   Distance Level Surface (feet) 300 ft  (ftx1  [id number])   Findings I w/ wc management  Curb or Single Stair   Reason if not Attempted Activity not applicable   1 Step (Curb) CARE Score 9   4 Steps   Reason if not Attempted Activity not applicable   4 Steps CARE Score 9   12 Steps   Reason if not Attempted Activity not applicable   12 Steps CARE Score 9   Picking Up Object   Reason if not Attempted Safety concerns   Picking Up Object CARE Score 88   Toilet Transfer   Type of Assistance Needed Independent; Adaptive equipment   Amount of Physical Assistance Provided No physical assistance   Comment Cole with PRW, A for placement of PRW   Toilet Transfer CARE Score 6   Therapeutic Interventions   Strengthening Supine on mat: R SLR AAROM from PT 3 x6  hip abd/add AAROM w/ slideboard R 2x10  ankleDF/PF/Ev/Inv w/ yellow tband [id number]  Seated: #2marching, #2LAQ, heel raise LLE 2x10   Flexibility PROM: hamstring/gastroc 20" x5 RLE   Assessment   Treatment Assessment Pt participated in 60min skilled PT session with emphasis on LE strengthening and wc mobility  Pt has IRP at Cole level with use of wc and PRW  Pt is able to manage all wc parts and does not need use of leg lift at this time due to strength improvements   Pt notes sharp pain below anterior knee with lying supine after about 20 min, even with use of wedge for LE elevation  Less active assist required for SLR  Pt progressed wc ambulation distance this session demonstrating improved endurance and strength  Pt needed a rest break about every 100ft for 1min  Required A for ascend and descend of indoor ramp  Plan to continue focus on strengthening, transfers, wc mobility, and conditioning for anticipated dc to daughter's home Saturday 04/17  FT planned for this afternoon session to practice car transfer  Problem List Decreased strength;Decreased endurance; Impaired balance;Orthopedic restrictions   Barriers to Discharge Inaccessible home environment

## 2021-04-15 NOTE — PLAN OF CARE
Problem: PAIN - ADULT  Goal: Verbalizes/displays adequate comfort level or baseline comfort level  Description: Interventions:  - Encourage patient to monitor pain and request assistance  - Assess pain using appropriate pain scale  - Administer analgesics based on type and severity of pain and evaluate response  - Implement non-pharmacological measures as appropriate and evaluate response  - Consider cultural and social influences on pain and pain management  - Notify physician/advanced practitioner if interventions unsuccessful or patient reports new pain  Outcome: Progressing     Problem: INFECTION - ADULT  Goal: Absence or prevention of progression during hospitalization  Description: INTERVENTIONS:  - Assess and monitor for signs and symptoms of infection  - Monitor lab/diagnostic results  - Monitor all insertion sites, i e  indwelling lines, tubes, and drains  - Monitor endotracheal if appropriate and nasal secretions for changes in amount and color  - [place] appropriate cooling/warming therapies per order  - Administer medications as ordered  - Instruct and encourage patient and family to use good hand hygiene technique  - Identify and instruct in appropriate isolation precautions for identified infection/condition  Outcome: Progressing  Goal: Absence of fever/infection during neutropenic period  Description: INTERVENTIONS:  - Monitor WBC    Outcome: Progressing     Problem: SAFETY ADULT  Goal: Patient will remain free of falls  Description: INTERVENTIONS:  - Assess patient frequently for physical needs  -  Identify cognitive and physical deficits and behaviors that affect risk of falls    -  [place] fall precautions as indicated by assessment   - Educate patient/family on patient safety including physical limitations  - Instruct patient to call for assistance with activity based on assessment  - Modify environment to reduce risk of injury  - Consider OT/PT consult to assist with strengthening/mobility  Outcome: Progressing  Goal: Maintain or return to baseline ADL function  Description: INTERVENTIONS:  -  Assess patient's ability to carry out ADLs; assess patient's baseline for ADL function and identify physical deficits which impact ability to perform ADLs (bathing, care of mouth/teeth, toileting, grooming, dressing, etc )  - Assess/evaluate cause of self-care deficits   - Assess range of motion  - Assess patient's mobility; develop plan if impaired  - Assess patient's need for assistive devices and provide as appropriate  - Encourage maximum independence but intervene and supervise when necessary  - Involve family in performance of ADLs  - Assess for home care needs following discharge   - Consider OT consult to assist with ADL evaluation and planning for discharge  - Provide patient education as appropriate  Outcome: Progressing  Goal: Maintain or return mobility status to optimal level  Description: INTERVENTIONS:  - Assess patient's baseline mobility status (ambulation, transfers, stairs, etc )    - Identify cognitive and physical deficits and behaviors that affect mobility  - Identify mobility aids required to assist with transfers and/or ambulation (gait belt, sit-to-stand, lift, walker, cane, etc )  - [place] fall precautions as indicated by assessment  - Record patient progress and toleration of activity level on Mobility SBAR; progress patient to next Phase/Stage  - Instruct patient to call for assistance with activity based on assessment  - Consider rehabilitation consult to assist with strengthening/weightbearing, etc   Outcome: Progressing     Problem: DISCHARGE PLANNING  Goal: Discharge to home or other facility with appropriate resources  Description: INTERVENTIONS:  - Identify barriers to discharge w/patient and caregiver  - Arrange for needed discharge resources and transportation as appropriate  - Identify discharge learning needs (meds, wound care, etc )  - Arrange for interpretive services to assist at discharge as needed  - Refer to Case Management Department for coordinating discharge planning if the patient needs post-hospital services based on physician/advanced practitioner order or complex needs related to functional status, cognitive ability, or social support system  Outcome: Progressing     Problem: Knowledge Deficit  Goal: Patient/family/caregiver demonstrates understanding of disease process, treatment plan, medications, and discharge instructions  Description: Complete learning assessment and assess knowledge base  Interventions:  - Provide teaching at level of understanding  - Provide teaching via preferred learning methods  Outcome: Progressing     Problem: Potential for Falls  Goal: Patient will remain free of falls  Description: INTERVENTIONS:  - Assess patient frequently for physical needs  -  Identify cognitive and physical deficits and behaviors that affect risk of falls    -  [place] fall precautions as indicated by assessment   - Educate patient/family on patient safety including physical limitations  - Instruct patient to call for assistance with activity based on assessment  - Modify environment to reduce risk of injury  - Consider OT/PT consult to assist with strengthening/mobility  Outcome: Progressing     Problem: Prexisting or High Potential for Compromised Skin Integrity  Goal: Skin integrity is maintained or improved  Description: INTERVENTIONS:  - Identify patients at risk for skin breakdown  - Assess and monitor skin integrity  - Assess and monitor nutrition and hydration status  - Monitor labs   - Assess for incontinence   - Turn and reposition patient  - Assist with mobility/ambulation  - Relieve pressure over bony prominences  - Avoid friction and shearing  - Provide appropriate hygiene as needed including keeping skin clean and dry  - Evaluate need for skin moisturizer/barrier cream  - Collaborate with interdisciplinary team   - Patient/family teaching  - Consider wound care consult   Outcome: Progressing     Problem: GASTROINTESTINAL - ADULT  Goal: Minimal or absence of nausea and/or vomiting  Description: INTERVENTIONS:  - Administer IV fluids if ordered to ensure adequate hydration  - Maintain NPO status until nausea and vomiting are resolved  - Nasogastric tube if ordered  - Administer ordered antiemetic medications as needed  - Provide nonpharmacologic comfort measures as appropriate  - Advance diet as tolerated, if ordered  - Consider nutrition services referral to assist patient with adequate nutrition and appropriate food choices  Outcome: Progressing  Goal: Maintains or returns to baseline bowel function  Description: INTERVENTIONS:  - Assess bowel function  - Encourage oral fluids to ensure adequate hydration  - Administer IV fluids if ordered to ensure adequate hydration  - Administer ordered medications as needed  - Encourage mobilization and activity  - Consider nutritional services referral to assist patient with adequate nutrition and appropriate food choices  Outcome: Progressing  Goal: Maintains adequate nutritional intake  Description: INTERVENTIONS:  - Monitor percentage of each meal consumed  - Identify factors contributing to decreased intake, treat as appropriate  - Assist with meals as needed  - Monitor I&O, weight, and lab values if indicated  - Obtain nutrition services referral as needed  Outcome: Progressing     Problem: GENITOURINARY - ADULT  Goal: Maintains or returns to baseline urinary function  Description: INTERVENTIONS:  - Assess urinary function  - Encourage oral fluids to ensure adequate hydration if ordered  - Administer IV fluids as ordered to ensure adequate hydration  - Administer ordered medications as needed  - Offer frequent toileting  - Follow urinary retention protocol if ordered  Outcome: Progressing     Problem: METABOLIC, FLUID AND ELECTROLYTES - ADULT  Goal: Electrolytes maintained within normal limits  Description: INTERVENTIONS:  - Monitor labs and assess patient for signs and symptoms of electrolyte imbalances  - Administer electrolyte replacement as ordered  - Monitor response to electrolyte replacements, including repeat lab results as appropriate  - Instruct patient on fluid and nutrition as appropriate  Outcome: Progressing  Goal: Fluid balance maintained  Description: INTERVENTIONS:  - Monitor labs   - Monitor I/O and WT  - Instruct patient on fluid and nutrition as appropriate  - Assess for signs & symptoms of volume excess or deficit  Outcome: Progressing     Problem: SKIN/TISSUE INTEGRITY - ADULT  Goal: Skin integrity remains intact  Description: INTERVENTIONS  - Identify patients at risk for skin breakdown  - Assess and monitor skin integrity  - Assess and monitor nutrition and hydration status  - Monitor labs (i e  albumin)  - Assess for incontinence   - Turn and reposition patient  - Assist with mobility/ambulation  - Relieve pressure over bony prominences  - Avoid friction and shearing  - Provide appropriate hygiene as needed including keeping skin clean and dry  - Evaluate need for skin moisturizer/barrier cream  - Collaborate with interdisciplinary team (i e  Nutrition, Rehabilitation, etc )   - Patient/family teaching  Outcome: Progressing     Problem: HEMATOLOGIC - ADULT  Goal: Maintains hematologic stability  Description: INTERVENTIONS  - Assess for signs and symptoms of bleeding or hemorrhage  - Monitor labs  - Administer supportive blood products/factors as ordered and appropriate  Outcome: Progressing     Problem: MUSCULOSKELETAL - ADULT  Goal: Maintain or return mobility to safest level of function  Description: INTERVENTIONS:  - Assess patient's ability to carry out ADLs; assess patient's baseline for ADL function and identify physical deficits which impact ability to perform ADLs (bathing, care of mouth/teeth, toileting, grooming, dressing, etc )  - Assess/evaluate cause of self-care deficits   - Assess range of motion  - Assess patient's mobility  - Assess patient's need for assistive devices and provide as appropriate  - Encourage maximum independence but intervene and supervise when necessary  - Involve family in performance of ADLs  - Assess for home care needs following discharge   - Consider OT consult to assist with ADL evaluation and planning for discharge  - Provide patient education as appropriate  Outcome: Progressing  Goal: Maintain proper alignment of affected body part  Description: INTERVENTIONS:  - Support, maintain and protect limb and body alignment  - Provide patient/ family with appropriate education  Outcome: Progressing     Problem: Prexisting or High Potential for Compromised Skin Integrity  Goal: Skin integrity is maintained or improved  Description: INTERVENTIONS:  - Identify patients at risk for skin breakdown  - Assess and monitor skin integrity  - Assess and monitor nutrition and hydration status  - Monitor labs   - Assess for incontinence   - Turn and reposition patient  - Assist with mobility/ambulation  - Relieve pressure over bony prominences  - Avoid friction and shearing  - Provide appropriate hygiene as needed including keeping skin clean and dry  - Evaluate need for skin moisturizer/barrier cream  - Collaborate with interdisciplinary team   - Patient/family teaching  - Consider wound care consult   Outcome: Progressing

## 2021-04-16 PROCEDURE — 97530 THERAPEUTIC ACTIVITIES: CPT

## 2021-04-16 PROCEDURE — 97110 THERAPEUTIC EXERCISES: CPT

## 2021-04-16 PROCEDURE — 97542 WHEELCHAIR MNGMENT TRAINING: CPT

## 2021-04-16 PROCEDURE — 97535 SELF CARE MNGMENT TRAINING: CPT

## 2021-04-16 PROCEDURE — 99233 SBSQ HOSP IP/OBS HIGH 50: CPT | Performed by: PHYSICAL MEDICINE & REHABILITATION

## 2021-04-16 PROCEDURE — 99232 SBSQ HOSP IP/OBS MODERATE 35: CPT | Performed by: INTERNAL MEDICINE

## 2021-04-16 RX ORDER — ERGOCALCIFEROL 1.25 MG/1
50000 CAPSULE ORAL WEEKLY
Qty: 4 CAPSULE | Refills: 0 | Status: SHIPPED | OUTPATIENT
Start: 2021-04-23

## 2021-04-16 RX ORDER — ASCORBIC ACID 500 MG
500 TABLET ORAL DAILY
Refills: 0
Start: 2021-04-17

## 2021-04-16 RX ORDER — CALCIUM CARBONATE 200(500)MG
1000 TABLET,CHEWABLE ORAL DAILY PRN
Refills: 0
Start: 2021-04-16

## 2021-04-16 RX ORDER — ACETAMINOPHEN 325 MG/1
650 TABLET ORAL EVERY 6 HOURS PRN
Refills: 0
Start: 2021-04-16

## 2021-04-16 RX ORDER — FERROUS SULFATE 325(65) MG
325 TABLET ORAL
Refills: 0
Start: 2021-04-17

## 2021-04-16 RX ORDER — OXYCODONE HYDROCHLORIDE 5 MG/1
TABLET ORAL
Qty: 30 TABLET | Refills: 0 | Status: SHIPPED | OUTPATIENT
Start: 2021-04-16

## 2021-04-16 RX ORDER — LEVOTHYROXINE SODIUM 175 UG/1
175 TABLET ORAL
Qty: 30 TABLET | Refills: 0 | Status: SHIPPED | OUTPATIENT
Start: 2021-04-17

## 2021-04-16 RX ADMIN — LEVOTHYROXINE SODIUM 175 MCG: 0.15 TABLET ORAL at 06:10

## 2021-04-16 RX ADMIN — PANTOPRAZOLE SODIUM 20 MG: 20 TABLET, DELAYED RELEASE ORAL at 06:11

## 2021-04-16 RX ADMIN — ENOXAPARIN SODIUM 40 MG: 40 INJECTION SUBCUTANEOUS at 16:15

## 2021-04-16 RX ADMIN — ERGOCALCIFEROL 50000 UNITS: 1.25 CAPSULE ORAL at 08:29

## 2021-04-16 RX ADMIN — OXYCODONE HYDROCHLORIDE AND ACETAMINOPHEN 500 MG: 500 TABLET ORAL at 08:29

## 2021-04-16 RX ADMIN — OXYCODONE HYDROCHLORIDE 5 MG: 5 TABLET ORAL at 10:19

## 2021-04-16 RX ADMIN — ONDANSETRON 4 MG: 4 TABLET, ORALLY DISINTEGRATING ORAL at 14:05

## 2021-04-16 RX ADMIN — DOCUSATE SODIUM 100 MG: 100 CAPSULE ORAL at 08:29

## 2021-04-16 RX ADMIN — Medication 1 TABLET: at 08:31

## 2021-04-16 RX ADMIN — FERROUS SULFATE TAB 325 MG (65 MG ELEMENTAL FE) 325 MG: 325 (65 FE) TAB at 08:29

## 2021-04-16 NOTE — PLAN OF CARE
Problem: PAIN - ADULT  Goal: Verbalizes/displays adequate comfort level or baseline comfort level  Description: Interventions:  - Encourage patient to monitor pain and request assistance  - Assess pain using appropriate pain scale  - Administer analgesics based on type and severity of pain and evaluate response  - Implement non-pharmacological measures as appropriate and evaluate response  - Consider cultural and social influences on pain and pain management  - Notify physician/advanced practitioner if interventions unsuccessful or patient reports new pain  Outcome: Progressing     Problem: INFECTION - ADULT  Goal: Absence or prevention of progression during hospitalization  Description: INTERVENTIONS:  - Assess and monitor for signs and symptoms of infection  - Monitor lab/diagnostic results  - Monitor all insertion sites, i e  indwelling lines, tubes, and drains  - Monitor endotracheal if appropriate and nasal secretions for changes in amount and color  - Rose appropriate cooling/warming therapies per order  - Administer medications as ordered  - Instruct and encourage patient and family to use good hand hygiene technique  - Identify and instruct in appropriate isolation precautions for identified infection/condition  Outcome: Progressing  Goal: Absence of fever/infection during neutropenic period  Description: INTERVENTIONS:  - Monitor WBC    Outcome: Progressing     Problem: SAFETY ADULT  Goal: Patient will remain free of falls  Description: INTERVENTIONS:  - Assess patient frequently for physical needs  -  Identify cognitive and physical deficits and behaviors that affect risk of falls    -  Rose fall precautions as indicated by assessment   - Educate patient/family on patient safety including physical limitations  - Instruct patient to call for assistance with activity based on assessment  - Modify environment to reduce risk of injury  - Consider OT/PT consult to assist with strengthening/mobility  Outcome: Progressing  Goal: Maintain or return to baseline ADL function  Description: INTERVENTIONS:  -  Assess patient's ability to carry out ADLs; assess patient's baseline for ADL function and identify physical deficits which impact ability to perform ADLs (bathing, care of mouth/teeth, toileting, grooming, dressing, etc )  - Assess/evaluate cause of self-care deficits   - Assess range of motion  - Assess patient's mobility; develop plan if impaired  - Assess patient's need for assistive devices and provide as appropriate  - Encourage maximum independence but intervene and supervise when necessary  - Involve family in performance of ADLs  - Assess for home care needs following discharge   - Consider OT consult to assist with ADL evaluation and planning for discharge  - Provide patient education as appropriate  Outcome: Progressing  Goal: Maintain or return mobility status to optimal level  Description: INTERVENTIONS:  - Assess patient's baseline mobility status (ambulation, transfers, stairs, etc )    - Identify cognitive and physical deficits and behaviors that affect mobility  - Identify mobility aids required to assist with transfers and/or ambulation (gait belt, sit-to-stand, lift, walker, cane, etc )  - [place] fall precautions as indicated by assessment  - Record patient progress and toleration of activity level on Mobility SBAR; progress patient to next Phase/Stage  - Instruct patient to call for assistance with activity based on assessment  - Consider rehabilitation consult to assist with strengthening/weightbearing, etc   Outcome: Progressing     Problem: DISCHARGE PLANNING  Goal: Discharge to home or other facility with appropriate resources  Description: INTERVENTIONS:  - Identify barriers to discharge w/patient and caregiver  - Arrange for needed discharge resources and transportation as appropriate  - Identify discharge learning needs (meds, wound care, etc )  - Arrange for interpretive services to assist at discharge as needed  - Refer to Case Management Department for coordinating discharge planning if the patient needs post-hospital services based on physician/advanced practitioner order or complex needs related to functional status, cognitive ability, or social support system  Outcome: Progressing     Problem: Knowledge Deficit  Goal: Patient/family/caregiver demonstrates understanding of disease process, treatment plan, medications, and discharge instructions  Description: Complete learning assessment and assess knowledge base  Interventions:  - Provide teaching at level of understanding  - Provide teaching via preferred learning methods  Outcome: Progressing     Problem: Potential for Falls  Goal: Patient will remain free of falls  Description: INTERVENTIONS:  - Assess patient frequently for physical needs  -  Identify cognitive and physical deficits and behaviors that affect risk of falls    -  [place] fall precautions as indicated by assessment   - Educate patient/family on patient safety including physical limitations  - Instruct patient to call for assistance with activity based on assessment  - Modify environment to reduce risk of injury  - Consider OT/PT consult to assist with strengthening/mobility  Outcome: Progressing     Problem: Prexisting or High Potential for Compromised Skin Integrity  Goal: Skin integrity is maintained or improved  Description: INTERVENTIONS:  - Identify patients at risk for skin breakdown  - Assess and monitor skin integrity  - Assess and monitor nutrition and hydration status  - Monitor labs   - Assess for incontinence   - Turn and reposition patient  - Assist with mobility/ambulation  - Relieve pressure over bony prominences  - Avoid friction and shearing  - Provide appropriate hygiene as needed including keeping skin clean and dry  - Evaluate need for skin moisturizer/barrier cream  - Collaborate with interdisciplinary team   - Patient/family teaching  - Consider wound care consult   Outcome: Progressing     Problem: GASTROINTESTINAL - ADULT  Goal: Minimal or absence of nausea and/or vomiting  Description: INTERVENTIONS:  - Administer IV fluids if ordered to ensure adequate hydration  - Maintain NPO status until nausea and vomiting are resolved  - Nasogastric tube if ordered  - Administer ordered antiemetic medications as needed  - Provide nonpharmacologic comfort measures as appropriate  - Advance diet as tolerated, if ordered  - Consider nutrition services referral to assist patient with adequate nutrition and appropriate food choices  Outcome: Progressing  Goal: Maintains or returns to baseline bowel function  Description: INTERVENTIONS:  - Assess bowel function  - Encourage oral fluids to ensure adequate hydration  - Administer IV fluids if ordered to ensure adequate hydration  - Administer ordered medications as needed  - Encourage mobilization and activity  - Consider nutritional services referral to assist patient with adequate nutrition and appropriate food choices  Outcome: Progressing  Goal: Maintains adequate nutritional intake  Description: INTERVENTIONS:  - Monitor percentage of each meal consumed  - Identify factors contributing to decreased intake, treat as appropriate  - Assist with meals as needed  - Monitor I&O, weight, and lab values if indicated  - Obtain nutrition services referral as needed  Outcome: Progressing     Problem: GENITOURINARY - ADULT  Goal: Maintains or returns to baseline urinary function  Description: INTERVENTIONS:  - Assess urinary function  - Encourage oral fluids to ensure adequate hydration if ordered  - Administer IV fluids as ordered to ensure adequate hydration  - Administer ordered medications as needed  - Offer frequent toileting  - Follow urinary retention protocol if ordered  Outcome: Progressing     Problem: METABOLIC, FLUID AND ELECTROLYTES - ADULT  Goal: Electrolytes maintained within normal limits  Description: INTERVENTIONS:  - Monitor labs and assess patient for signs and symptoms of electrolyte imbalances  - Administer electrolyte replacement as ordered  - Monitor response to electrolyte replacements, including repeat lab results as appropriate  - Instruct patient on fluid and nutrition as appropriate  Outcome: Progressing  Goal: Fluid balance maintained  Description: INTERVENTIONS:  - Monitor labs   - Monitor I/O and WT  - Instruct patient on fluid and nutrition as appropriate  - Assess for signs & symptoms of volume excess or deficit  Outcome: Progressing     Problem: SKIN/TISSUE INTEGRITY - ADULT  Goal: Skin integrity remains intact  Description: INTERVENTIONS  - Identify patients at risk for skin breakdown  - Assess and monitor skin integrity  - Assess and monitor nutrition and hydration status  - Monitor labs (i e  albumin)  - Assess for incontinence   - Turn and reposition patient  - Assist with mobility/ambulation  - Relieve pressure over bony prominences  - Avoid friction and shearing  - Provide appropriate hygiene as needed including keeping skin clean and dry  - Evaluate need for skin moisturizer/barrier cream  - Collaborate with interdisciplinary team (i e  Nutrition, Rehabilitation, etc )   - Patient/family teaching  Outcome: Progressing     Problem: HEMATOLOGIC - ADULT  Goal: Maintains hematologic stability  Description: INTERVENTIONS  - Assess for signs and symptoms of bleeding or hemorrhage  - Monitor labs  - Administer supportive blood products/factors as ordered and appropriate  Outcome: Progressing     Problem: MUSCULOSKELETAL - ADULT  Goal: Maintain or return mobility to safest level of function  Description: INTERVENTIONS:  - Assess patient's ability to carry out ADLs; assess patient's baseline for ADL function and identify physical deficits which impact ability to perform ADLs (bathing, care of mouth/teeth, toileting, grooming, dressing, etc )  - Assess/evaluate cause of self-care deficits   - Assess range of motion  - Assess patient's mobility  - Assess patient's need for assistive devices and provide as appropriate  - Encourage maximum independence but intervene and supervise when necessary  - Involve family in performance of ADLs  - Assess for home care needs following discharge   - Consider OT consult to assist with ADL evaluation and planning for discharge  - Provide patient education as appropriate  Outcome: Progressing  Goal: Maintain proper alignment of affected body part  Description: INTERVENTIONS:  - Support, maintain and protect limb and body alignment  - Provide patient/ family with appropriate education  Outcome: Progressing     Problem: Prexisting or High Potential for Compromised Skin Integrity  Goal: Skin integrity is maintained or improved  Description: INTERVENTIONS:  - Identify patients at risk for skin breakdown  - Assess and monitor skin integrity  - Assess and monitor nutrition and hydration status  - Monitor labs   - Assess for incontinence   - Turn and reposition patient  - Assist with mobility/ambulation  - Relieve pressure over bony prominences  - Avoid friction and shearing  - Provide appropriate hygiene as needed including keeping skin clean and dry  - Evaluate need for skin moisturizer/barrier cream  - Collaborate with interdisciplinary team   - Patient/family teaching  - Consider wound care consult   Outcome: Progressing

## 2021-04-16 NOTE — PROGRESS NOTES
04/16/21 1000   Pain Assessment   Pain Assessment Tool 0-10   Pain Score 8   Pain Location/Orientation Orientation: Right;Location: Leg   Restrictions/Precautions   Precautions Fall Risk;Pain   Weight Bearing Restrictions Yes   LUE Weight Bearing Per Order NWB   RLE Weight Bearing Per Order NWB   Braces or Orthoses LE Braces  (HKB R LE- locked in ext/ long splint LUE)   Cognition   Overall Cognitive Status WFL   Arousal/Participation Alert; Cooperative   Attention Within functional limits   Orientation Level Oriented X4   Memory Within functional limits   Following Commands Follows all commands and directions without difficulty   Subjective   Subjective "I'm in a lot of pain right now in my leg, can I get medicine?"   Sit to Stand   Type of Assistance Needed Independent; Adaptive equipment   Amount of Physical Assistance Provided No physical assistance   Comment Cole with PF RW   Sit to Stand CARE Score 6   Bed-Chair Transfer   Type of Assistance Needed Independent; Adaptive equipment   Amount of Physical Assistance Provided No physical assistance   Comment Cole with PF RW   Chair/Bed-to-Chair Transfer CARE Score 6   Car Transfer   Reason if not Attempted Environmental limitations   Car Transfer CARE Score 10   Walk 10 Feet   Reason if not Attempted Safety concerns   Walk 10 Feet CARE Score 88   Walk 50 Feet with Two Turns   Reason if not Attempted Safety concerns   Walk 50 Feet with Two Turns CARE Score 88   Walk 150 Feet   Reason if not Attempted Safety concerns   Walk 150 Feet CARE Score 88   Walking 10 Feet on Uneven Surfaces   Reason if not Attempted Safety concerns   Walking 10 Feet on Uneven Surfaces CARE Score 88   Ambulation   Does the patient walk? 0  No, and walking goal is not clinically indicated  Primary Mode of Locomotion Prior to Admission Walk   Findings Did not walk this session   stand pivot with P RW to wc   Wheel 50 Feet with Two Turns   Type of Assistance Needed Independent   Amount of Physical Assistance Provided No physical assistance   Comment R UE and L LE   Wheel 50 Feet with Two Turns CARE Score 6   Wheel 150 Feet   Type of Assistance Needed Independent   Amount of Physical Assistance Provided No physical assistance   Comment R UE and L LE   Wheel 150 Feet CARE Score 6   Wheelchair mobility   Does the patient use a wheelchair? 1  Yes   Type of Wheelchair Used 1  Manual   Method Right upper extremity; Left lower extremity   Assistance Provided For Other  (indoor ramp maxA)   Distance Level Surface (feet) 400 ft  (ftx1)   Findings I w/ wc management   Curb or Single Stair   Reason if not Attempted Activity not applicable   1 Step (Curb) CARE Score 9   4 Steps   Reason if not Attempted Activity not applicable   4 Steps CARE Score 9   12 Steps   Reason if not Attempted Activity not applicable   12 Steps CARE Score 9   Picking Up Object   Reason if not Attempted Safety concerns   Picking Up Object CARE Score 88   Assessment   Treatment Assessment Pt engaged in brief 30min skilled PT with emphasis on wc mobility  Pt noted increased RLE pain at beginning of session, and RN gave her pain medication  Pt demonstrated Cole for transfers and wc part management  Progressed wc ambulation distance this visit, requiring a few minutes rest break about 200 ft due to fatigue  Pt was unable to propel wc up the indoor ramp, and required cueing for slow descend in wc  Will trial propeling wc backwards up indoor ramp this afternoon  Plan to continue wc mobility, transfers, LE strengthening, and bed mobility during afternoon session to prepare for anticipated dc to daughter's house tomorrow 4/17  Problem List Decreased strength;Decreased endurance; Impaired balance;Orthopedic restrictions   Barriers to Discharge Inaccessible home environment   PT Barriers   Physical Impairment Decreased mobility; Decreased endurance;Decreased strength; Impaired balance;Orthopedic restrictions   Functional Limitation Walking;Transfers;Stair negotiation;Ramp negotiation;Car transfers   Plan   Treatment/Interventions Functional transfer training;LE strengthening/ROM; Elevations; Therapeutic exercise; Endurance training;Patient/family training   Progress Progressing toward goals   Recommendation   PT Discharge Recommendation Home with home health rehabilitation   PT Equipment ordered yes, w/c and PF RW   PT - OK to Discharge Yes   Discharge Summary 4/17/21   PT Therapy Minutes   PT Time In 1000   PT Time Out 1030   PT Total Time (minutes) 30   PT Mode of treatment - Individual (minutes) 30   PT Mode of treatment - Concurrent (minutes) 0   PT Mode of treatment - Group (minutes) 0   PT Mode of treatment - Co-treat (minutes) 0   PT Mode of Treatment - Total time(minutes) 30 minutes   PT Cumulative Minutes 660   Therapy Time missed   Time missed?  No

## 2021-04-16 NOTE — PROGRESS NOTES
04/16/21 1230   Pain Assessment   Pain Assessment Tool 0-10   Pain Score 5   Pain Location/Orientation Orientation: Right;Location: Leg   Restrictions/Precautions   Precautions Fall Risk;Pain   Weight Bearing Restrictions Yes   LUE Weight Bearing Per Order NWB   RLE Weight Bearing Per Order NWB   Cognition   Overall Cognitive Status WFL   Arousal/Participation Alert; Cooperative   Attention Within functional limits   Orientation Level Oriented X4   Memory Within functional limits   Following Commands Follows all commands and directions without difficulty   Subjective   Subjective "I'm just tired"   Roll Left and Right   Reason if not Attempted Safety concerns  (unable to roll to L due to LUE NWB )   Roll Left and Right CARE Score 88   Sit to Lying   Type of Assistance Needed Independent   Amount of Physical Assistance Provided No physical assistance   Sit to Lying CARE Score 6   Lying to Sitting on Side of Bed   Type of Assistance Needed Independent   Amount of Physical Assistance Provided No physical assistance   Lying to Sitting on Side of Bed CARE Score 6   Sit to Stand   Type of Assistance Needed Independent; Adaptive equipment   Amount of Physical Assistance Provided No physical assistance   Comment Cole with PF RW   Sit to Stand CARE Score 6   Bed-Chair Transfer   Type of Assistance Needed Independent; Adaptive equipment   Amount of Physical Assistance Provided No physical assistance   Comment Cole with PF RW   Chair/Bed-to-Chair Transfer CARE Score 6   Car Transfer   Reason if not Attempted Environmental limitations   Car Transfer CARE Score 10   Walk 10 Feet   Reason if not Attempted Safety concerns   Walk 10 Feet CARE Score 88   Walk 50 Feet with Two Turns   Reason if not Attempted Safety concerns   Walk 50 Feet with Two Turns CARE Score 88   Walk 150 Feet   Reason if not Attempted Safety concerns   Walk 150 Feet CARE Score 88   Walking 10 Feet on Uneven Surfaces   Reason if not Attempted Safety concerns Walking 10 Feet on Uneven Surfaces CARE Score 88   Ambulation   Does the patient walk? 0  No, and walking goal is not clinically indicated  Primary Mode of Locomotion Prior to Admission Walk   Distance Walked (feet) 8 ft  (ft)   Assist Device Platform;Roller Walker   Gait Pattern Hopping   Limitations Noted In Balance; Endurance; Safety;Strength;Speed   Provided Assistance with: Balance   Walk Assist Level Supervision   Findings short distance for hopping fwd with L PFRW, maintaining NWB R LE   Wheel 50 Feet with Two Turns   Type of Assistance Needed Independent   Amount of Physical Assistance Provided No physical assistance   Comment R UE and L LE   Wheel 50 Feet with Two Turns CARE Score 6   Wheel 150 Feet   Type of Assistance Needed Independent   Amount of Physical Assistance Provided No physical assistance   Comment R UE and L LE   Wheel 150 Feet CARE Score 6   Wheelchair mobility   Does the patient use a wheelchair? 1  Yes   Type of Wheelchair Used 1  Manual   Method Right upper extremity; Left lower extremity   Distance Level Surface (feet) 150 ft  (ftx2, [id number])   Findings I w/ wc management   Curb or Single Stair   Reason if not Attempted Activity not applicable   1 Step (Curb) CARE Score 9   4 Steps   Reason if not Attempted Activity not applicable   4 Steps CARE Score 9   12 Steps   Reason if not Attempted Activity not applicable   12 Steps CARE Score 9   Picking Up Object   Reason if not Attempted Safety concerns   Picking Up Object CARE Score 88   Therapeutic Interventions   Strengthening Seated: #2marching, #2LAQ, heel raise LLE 2x10  ankle DF/PF/EV/Inv x15 RLE yellow tband    Balance wc <> //bars STS with SLS and UE support from // bars 2min x3   Assessment   Treatment Assessment Pt participated in 60min skilled PT session with emphasis on wc mobility, tranfers, bed mobility, LE strengthening, and stand tolerance  Pt educated on propelling backwards in wc up indoor ramp, pt performed with S   Pt was unable to roll to the left at this time due to LUE pain and NWB status  Performed all other bed mobility and transfers independently  Pt performed short distance hopping for 8ft with S and left platform RW maintaining LUE and RLE NWB status  Did not meet set ambulation goal for 10-15 ft; however pt reports remian wc level for all locomotor tasks at dc  Pt has met all I- S goals set for therapy at this time  Plan to dc pt tomorrow 4/17 to daughter's house at wc level  Daughter to bring RW for platform fitting this pm or prior to dc in am    Problem List Decreased strength;Decreased endurance; Impaired balance;Orthopedic restrictions   PT Barriers   Physical Impairment Decreased mobility; Decreased endurance;Decreased strength; Impaired balance;Orthopedic restrictions   Functional Limitation Walking;Transfers;Stair negotiation;Ramp negotiation;Car transfers   Plan   Treatment/Interventions Functional transfer training;LE strengthening/ROM; Elevations; Therapeutic exercise;Patient/family training;Gait training;Bed mobility; Endurance training   Progress Progressing toward goals   Recommendation   PT Discharge Recommendation Home with home health rehabilitation   PT Equipment ordered yes, pt recieved PF RW   PT - OK to Discharge Yes   Discharge Summary 4/17/21   PT Therapy Minutes   PT Time In 1230   PT Time Out 1330   PT Total Time (minutes) 60   PT Mode of treatment - Individual (minutes) 60   PT Mode of treatment - Concurrent (minutes) 0   PT Mode of treatment - Group (minutes) 0   PT Mode of treatment - Co-treat (minutes) 0   PT Mode of Treatment - Total time(minutes) 60 minutes   PT Cumulative Minutes 720   Therapy Time missed   Time missed?  No

## 2021-04-16 NOTE — ASSESSMENT & PLAN NOTE
Noted on 4/8, not itchy or painful  Currently receiving bacitracin ointment  Pt has had in past and resolves with vaseline  Improved  Bacitracin discontinued  Started on multivitamin

## 2021-04-16 NOTE — DISCHARGE INSTRUCTIONS
DISCHARGE INSTRUCTIONS:    · ACTIVITY: Please follow mobility and self care instructions as you were taught by [facility] therapy  You will continue PT/OT at home with home care  Please contact orthopedics with questions regarding knee brace locked in extension or left wrist splint  · WEIGHT BEARING: mildred PAULSON to use platform attachment to left elbow  NWJUANIS RLE - maintain in TROM knee brace locked in extension  · MEDICATION CHANGES: 1   You have a new dose of Synthroid for your hypothyroidism  Please take this new dose and have your primary care physician follow your thyroid test   2   You have been diagnosed with Vitamin D deficiency and should continue the weekly dose for 4 weeks, then transition to daily supplement  Please discuss with your primary care physician to follow  3  You also have a daily Lovenox injections for 2 more weeks to prevent blood clots  4  You have been prescribed oxycodone for severe pain  If you need refills you will need to contact your doctor or orthopedic doctor  5  You should continue iron supplementation and Vit C supplementation for mild anemia  ·  Do Not Shower or submerge injured limbs in water  AVOID NSAIDS (Ibuprofen, Advil, Alevel, Motrin) until cleared by your orthopedic physician       · Please obtain medication refills with your PCP

## 2021-04-16 NOTE — PROGRESS NOTES
PM&R PROGRESS NOTE:  Qamar Hurt 63 y o  female MRN: [id number]  Unit/Bed#: -01 Encounter: [id number]        Rehab Diagnosis: Impairment of mobility, safety and Activities of Daily Living (ADLs) due to Orthopedic Disorders:  08 9 Other Orthopedic Right tibial plateu fracture, left radius fracture    HPI: Qamar Hurt is a 63 y o  female with hypothyroidism, GERD, gastric sleeve, who presented to the [address] on 4/3/21 status post traumatic fall upon exiting her car  Sustained a right tibial plateau comminuted nondisplaced fracture, left least distal radius fracture  Evaluated by Orthopedics and treated nonsurgically  Deemed nonweightbearing right lower extremity as well as left wrist (allowed to use platform walker at left elbow)  Placed in a hinged knee brace locked in extension and a sugar-tong splint for the left upper extremity  Patient will require repeat imaging in 1 week  Medically, patient was found to have some orthostasis which was symptomatic and IV fluids  Patient with mild anemia and thrombocytopenia which was followed  Patient also treated for acute nociceptive pain  Patient found to have acute functional her baseline given her nonweightbearing status, and after clearance and stabilization was admitted to [facility] for acute inpatient rehabilitation  SUBJECTIVE:  Patient seen in her room  Answered all questions for home  No acute issues overnight  Prepared for discharge in AM      ASSESSMENT: Stable, progressing      PLAN:    Rehabilitation   Functional deficits: right lower extremity weakness, left upper extremity weakness, decreased endurance, impaired mobility, self care    Continue current rehabilitation plan of care to maximize function   Family training in therapies today for discharge tomorrow    Overall progressed to Supervision - Mod I   Estimated Discharge:  Discharge home Saturday 4/17/21 with home care PT, OT      DVT prophylaxis  · Managed on subcutaneous Lovenox - Daughter will be performing these for home for an additional 2-3 weeks      Pain  · Managed on p r n  Tylenol, oxycodone IR 2 5 mg-5 mg every 4 hours p r n  for moderate to severe pain  · P r n  Flexeril available for muscular spasms  · Continue topical ice and elevation of limb     Bladder plan  · Continent     Bowel plan  · Continent  · Last BM 4/15/21      * Tibial plateau fracture  Assessment & Plan  · Sustained after traumatic fall on 4/3/21  · Treated non-operatively by Orthopedics  · Deemed NWB RLE placed in a hinged knee brace locked in extension  · Follow-up x-ray completed 4/12/21 - reviewed by Ortho - continue current plan of care  · Follow-up with Orthopedics as outpatient     Left wrist distal radius nondisplaced fracture  Assessment & Plan  · Sustained after traumatic fall on 4/3/21  · Treated non-operatively by Orthopedics  · Deemed NWB Left wrist - but allowed platform walker at left elbow  · Placed in a sugar-tong splint  · Follow-up x-ray comleted 4/12/21 - reviewed by Ortho - splint was re-wrapped 4/13/21 with improvement in discomfort  Patient has follow-up appointment on Tuesday 4/21/21  · Follow-up with Orthopedics as outpatient        Anemia  Assessment & Plan  · Mild  · Asymptomatic  · Will follow    History of sleeve gastrectomy  Assessment & Plan  · S/p laparascopic sleeve gastrectomy and,cholecystectomy - Dr Gina Rousseau @ [facility] on 12/9/15      Thrombocytopenia ([address] )  Assessment & Plan  · Resolved    Dizziness  Assessment & Plan  · Resolved  · Was related to mild orthostasis recovered with IV hydration  · In ARC, if needed will use TEDS and abdominal binder    GERD (gastroesophageal reflux disease)  Assessment & Plan  · Managed on Protonix (therapeutic exchange for Omeprazole)  · PRN tums and mylanta    Hypothyroidism  Assessment & Plan  · Managed on Synthroid 175mg daily   · Repeat Thyroid labs in 4-6 weeks     Appreciate IM consultants medical co-management  Labs, medications, and imaging personally reviewed  ROS:  A ten point review of systems was completed on 4/16/21 and pertinent positives are listed in subjective section  All other systems reviewed were negative  OBJECTIVE:   /60 (BP Location: Right arm)   Pulse 80   Temp 97 6 °F (36 4 °C) (Tympanic)   Resp 18   Ht 5' 5" (1 651 m)   Wt 102 kg (224 lb 4 8 oz)   LMP 10/24/2016   SpO2 98%   BMI 37 33 kg/m²       Physical Exam  Vitals signs and nursing note reviewed  Constitutional:       General: She is not in acute distress  Appearance: She is well-developed  HENT:      Head: Normocephalic  Nose: Nose normal    Eyes:      Conjunctiva/sclera: Conjunctivae normal    Neck:      Musculoskeletal: Neck supple  Cardiovascular:      Rate and Rhythm: Normal rate  Pulmonary:      Effort: Pulmonary effort is normal       Breath sounds: No wheezing  Abdominal:      General: There is no distension  Palpations: Abdomen is soft  Musculoskeletal:      Right lower leg: Edema present  Comments: Hinged knee brace in place - improved swelling in knee  Splint LUE in place - fingers with good mobility   Skin:     General: Skin is warm  Neurological:      Mental Status: She is alert and oriented to person, place, and time     Psychiatric:         Mood and Affect: Mood normal           Lab Results   Component Value Date    WBC 5 00 04/15/2021    HGB 11 4 (L) 04/15/2021    HCT 33 9 (L) 04/15/2021    MCV 78 (L) 04/15/2021     04/15/2021     Lab Results   Component Value Date    SODIUM 136 (L) 04/15/2021    K 4 2 04/15/2021     04/15/2021    CO2 31 (H) 04/15/2021    BUN 15 04/15/2021    CREATININE 0 59 (L) 04/15/2021    GLUC 96 04/15/2021    CALCIUM 9 9 04/15/2021     No results found for: INR, PROTIME        Current Facility-Administered Medications:     acetaminophen (TYLENOL) tablet 650 mg, 650 mg, Oral, Q6H PRN, Marialuisa Suarez MD, 650 mg at 04/09/21 8699   aluminum-magnesium hydroxide-simethicone (MYLANTA) oral suspension 30 mL, 30 mL, Oral, Q6H PRN, Ankur Banegas MD    ascorbic acid (VITAMIN C) tablet 500 mg, 500 mg, Oral, Daily, ELEUTERIO Mar, 500 mg at 04/16/21 1616    bisacodyl (DULCOLAX) rectal suppository 10 mg, 10 mg, Rectal, Daily PRN, Ankur Bnaegas MD    calcium carbonate (TUMS) chewable tablet 1,000 mg, 1,000 mg, Oral, Daily PRN, Ankur Banegas MD    cyclobenzaprine (FLEXERIL) tablet 5 mg, 5 mg, Oral, TID PRN, Ankur Banegas MD    docusate sodium (COLACE) capsule 100 mg, 100 mg, Oral, BID, Ankur Banegas MD, 100 mg at 04/16/21 0829    enoxaparin (LOVENOX) subcutaneous injection 40 mg, 40 mg, Subcutaneous, Q24H [facility], ELEUTERIO Mar    ergocalciferol (VITAMIN D2) capsule 50,000 Units, 50,000 Units, Oral, Weekly, ELEUTERIO Mar, 50,000 Units at 04/16/21 0820    ferrous sulfate tablet 325 mg, 325 mg, Oral, Daily With Breakfast, ELEUTERIO Mar, 325 mg at 04/16/21 8201    levothyroxine tablet 175 mcg, 175 mcg, Oral, Early Morning, Ankur Banegas MD, 175 mcg at 04/16/21 0610    multivitamin-minerals (CENTRUM) tablet 1 tablet, 1 tablet, Oral, Daily, ELEUTERIO Mar, 1 tablet at 04/16/21 0831    ondansetron (ZOFRAN-ODT) dispersible tablet 4 mg, 4 mg, Oral, Q6H PRN, Ankur Banegas MD, 4 mg at 04/16/21 1405    oxyCODONE (ROXICODONE) IR tablet 2 5 mg, 2 5 mg, Oral, Q4H PRN, Ankur Banegas MD, 2 5 mg at 04/13/21 1521    oxyCODONE (ROXICODONE) IR tablet 5 mg, 5 mg, Oral, Q4H PRN, Ankur Banegas MD, 5 mg at 04/16/21 1019    pantoprazole (PROTONIX) EC tablet 20 mg, 20 mg, Oral, Early Morning, Ankur Banegas MD, 20 mg at 04/16/21 3811    polyethylene glycol (MIRALAX) packet 17 g, 17 g, Oral, Daily PRN, Ankur Banegas MD    simethicone (MYLICON) chewable tablet 80 mg, 80 mg, Oral, 4x Daily PRN, Ankur Banegas MD    Past Medical History:   Diagnosis Date    Disease of thyroid gland        Patient Active Problem List    Diagnosis Date Noted    Tibial plateau fracture 34/41/2703     Priority: High    Left wrist distal radius nondisplaced fracture 04/03/2021     Priority: Medium    Impaired mobility and activities of daily living 04/13/2021    Angular cheilitis 04/08/2021    History of sleeve gastrectomy 04/08/2021    Anemia 04/08/2021    Vitamin D deficiency 04/07/2021    Thrombocytopenia ([address] ) 04/07/2021    Dizziness 04/05/2021    Ambulatory dysfunction 04/03/2021    Hypothyroidism 04/03/2021    GERD (gastroesophageal reflux disease) 04/03/2021    Obesity 04/03/2021          Irene Ornelas MD    Total time spent:  30 minutes with more than 50% spent counseling/coordinating care  Counseling includes discussion with patient re: progress and discussion with patient of his/her current medical/functional state/information  Coordination of patient's care was performed in conjunction with consulting services  Time invested included review of patient's labs, vitals, and management of their comorbidities with continued monitoring  The care of the patient was extensively discussed and appropriate treatment plan was formulated unique for this patient  ** Please Note:  voice to text software may have been used in the creation of this document   Although proof errors in transcription or interpretation are a potential of such software**

## 2021-04-16 NOTE — ASSESSMENT & PLAN NOTE
4/3 - R tibial plateau fracture and L nondisplaced wrist fracture s/p mechanical fall   + Head strike, LOC  CT head - no acute abnormalities  Pain management  L wrist NWB, Sugar tong splint  RLE NWB, TROM brace locked in extension  Platform walker on L - allowed to bear weight on L elbow  Follow-up with Ortho (Dr Lindalee Cockayne) with repeat x-rays  (4/12)    X-ray on 4/12 - "Nondisplaced fracture of the proximal tibial metaphysis with intra-articular extension along the lateral tibial eminence, unchanged "    PT/OT Therapies  Primary team following

## 2021-04-16 NOTE — ASSESSMENT & PLAN NOTE
4/3 - R tibial plateau fracture and L nondisplaced wrist fracture s/p mechanical fall   + Head strike, LOC  CT head - no acute abnormalities  L wrist NWB, Sugar tong splint  Platform walker on L - allowed to bear weight on L elbow  Pain management  Follow-up with Ortho (Dr Yunier Booth) with repeat x-rays  (4/12)    X-ray on 4/12: Nondisplaced distal radius fracture is not well visualized  Alignment is near anatomic  PT/OT Therapies  Primary team following

## 2021-04-16 NOTE — PROGRESS NOTES
04/16/21 0700   Pain Assessment   Pain Assessment Tool 0-10   Pain Score 6   Pain Location/Orientation Orientation: Right;Location: Knee   Pain Onset/Description Onset: Gradual   Effect of Pain on Daily Activities Tolerated   Patient's Stated Pain Goal 1   Hospital Pain Intervention(s) Shower/Bath   Multiple Pain Sites No   Restrictions/Precautions   Precautions Fall Risk;Pain   Weight Bearing Restrictions Yes   LUE Weight Bearing Per Order NWB   RLE Weight Bearing Per Order NWB   Braces or Orthoses LE Immobilizer  (long splint LUE)   Eating   Type of Assistance Needed Independent   Amount of Physical Assistance Provided No physical assistance   Eating CARE Score 6   Oral Hygiene   Type of Assistance Needed Independent   Amount of Physical Assistance Provided No physical assistance   Comment Seated at sink   Oral Hygiene CARE Score 6   Shower/Bathe Self   Type of Assistance Needed Supervision; Adaptive equipment   Amount of Physical Assistance Provided No physical assistance   Comment Seated at sink to perform SB routine, bathing 10/10 parts  Pt seated to bathe UB and BLE's to feet, utilizing LH reacher with wash cloth to thoroughly bathe B/L feet  In stance to bathe arley/buttock with CS, demo G ability to maintain NWB restrictions  Shower/Bathe Self CARE Score 4   Tub/Shower Transfer   Reason Not Assessed Sponge Bath;Medical   Upper Body Dressing   Type of Assistance Needed Physical assistance   Amount of Physical Assistance Provided Less than 25%   Comment Nuria to clasp bra 2* to LUE restrictions  Pt able to don loose fitting [place] shirt IND   Upper Body Dressing CARE Score 3   Lower Body Dressing   Type of Assistance Needed Supervision; Adaptive equipment   Amount of Physical Assistance Provided No physical assistance   Comment Seated utilizing LHAE to thread BLE's into underwear/pants  In stance at sink top to perform CM up over hips with CS and no LOB     Lower Body Dressing CARE Score 4   Putting On/Taking Off Footwear   Type of Assistance Needed Physical assistance; Adaptive equipment   Amount of Physical Assistance Provided Less than 25%   Comment Utilized LHAE to don B/L socks, req A to fully manage R sock up over heel  Pt able to don L shoe without difficulty  Pt's daughter to A with donning footwear upon D/c home  Putting On/Taking Off Footwear CARE Score 3   Sit to Lying   Type of Assistance Needed Independent; Adaptive equipment   Amount of Physical Assistance Provided No physical assistance   Comment Use of leg    Sit to Lying CARE Score 6   Lying to Sitting on Side of Bed   Type of Assistance Needed Independent; Adaptive equipment   Amount of Physical Assistance Provided No physical assistance   Comment Use of leg    Lying to Sitting on Side of Bed CARE Score 6   Sit to Stand   Type of Assistance Needed Independent; Adaptive equipment   Amount of Physical Assistance Provided No physical assistance   Comment Cole with PF RW   Sit to Stand CARE Score 6   Bed-Chair Transfer   Type of Assistance Needed Independent; Adaptive equipment   Amount of Physical Assistance Provided No physical assistance   Comment Cole with PF RW   Chair/Bed-to-Chair Transfer CARE Score 6   Toileting Hygiene   Type of Assistance Needed Independent; Adaptive equipment   Amount of Physical Assistance Provided No physical assistance   Comment Cole with PF RW to perform CM down/up and hygiene task   Toileting Hygiene CARE Score 6   Toilet Transfer   Type of Assistance Needed Independent; Adaptive equipment   Amount of Physical Assistance Provided No physical assistance   Comment Cole with PF RW and use of over the toilet commode   Toilet Transfer CARE Score 6   Therapeutic Excerise-Strength   UE Strength Yes   Right Upper Extremity- Strength   R Shoulder Flexion;ABduction; Extension;Horizontal ABduction; External rotation; Internal rotation   R Elbow Elbow flexion;Elbow extension   R Position Seated   Equipment [place]  (4#)   R Weight/Reps/Sets 2 x 15   RUE Strength Comment Pt engaged in RUE TE, with pt requesting to utilize 4# dumbbell 2* to c/o of soreness from previous RUE TE  Focus of RUE TE to improve strength and endurance for ADL transfer's  Pt tolerated TE well  Cognition   Overall Cognitive Status WFL   Arousal/Participation Alert; Cooperative   Attention Within functional limits   Orientation Level Oriented X4   Memory Within functional limits   Following Commands Follows all commands and directions without difficulty   Additional Activities   Additional Activities Other (Comment)   Additional Activities Comments Pt performed fxnl w/c mobility utilizing new w/c  Pt demo G ability to navigate around obstacles within pathway, as well as manuever w/c within tight spaces  BAIN provided EDU on purpose of activity to familiarize pt with new w/c, as well as improve fxnl mobility w/c level for anticipated upcoming MD appointments  Activity Tolerance   Activity Tolerance Patient tolerated treatment well   Assessment   Treatment Assessment Pt participated in 90 min skilled OT Tx session with focus on ADL retraining, RUE TE, and fxnl w/c mobility  See above for further Tx details  Pt is performing SB routine at CS level while seated at sink  Nuria for UB dressing, CS LB dressing with use of LHAE  Pt is Cole for SPT's with PF RW, as well as Cole with PF RW and use of over the toilet commode for toileting routine  Pt plan to D/C to daughter's home on 4 17 with family support from daughter and Home PT services  DME includes PF RW, BSC, W/C, and LHAE  At this time, pt has no further questions for OT and is appropriate to D/C home  Prognosis Good   Problem List Decreased range of motion; Impaired balance;Orthopedic restrictions;Pain   Recommendation   OT Discharge Recommendation Home with home health rehabilitation  (no Home OT req'd at this time)   OT - OK to Discharge Yes   OT Therapy Minutes   OT Time In 0700   OT Time Out 0830   OT Total Time (minutes) 90   OT Mode of treatment - Individual (minutes) 90   OT Mode of treatment - Concurrent (minutes) 0   OT Mode of treatment - Group (minutes) 0   OT Mode of treatment - Co-treat (minutes) 0   OT Mode of Treatment - Total time(minutes) 90 minutes   OT Cumulative Minutes 870   Therapy Time missed   Time missed?  No

## 2021-04-16 NOTE — ASSESSMENT & PLAN NOTE
Hgb currently 11 4 from 12 1  Asymptomatic  Microcytic anemia - started on iron supplementation  Iron Panel: 18% Iron Sat, TIBC 326, Iron 60, Ferritin 42  Monitor for acute s/s of bleeding  Continue to trend with routine CBC

## 2021-04-16 NOTE — CASE MANAGEMENT
Spoke with Rich Rebollar from [employer] who stated that the review was on his list of things to do and would be calling me back with determination on approving coverage through tomorrow 4/17/21    Update @ [phone]: received a call from Rich Rebollar who approved from 4/14/21-4/16/2021 with anticipated discharge on 4/17/2021

## 2021-04-16 NOTE — PROGRESS NOTES
[facility]  Progress Note Valentin Koroma 1965, 63 y o  female MRN: [id number]  Unit/Bed#: -48 Encounter: [id number]  Primary Care Provider: Ephraim Sotelo MD   Date and time admitted to hospital: 4/7/2021  4:05 PM    Anemia  Assessment & Plan  Hgb currently 11 4 from 12 1  Asymptomatic  Microcytic anemia - started on iron supplementation  Iron Panel: 18% Iron Sat, TIBC 326, Iron 60, Ferritin 42  Monitor for acute s/s of bleeding  Continue to trend with routine CBC  Angular cheilitis  Assessment & Plan  Noted on 4/8, not itchy or painful  Currently receiving bacitracin ointment  Pt has had in past and resolves with vaseline  Improved  Bacitracin discontinued  Started on multivitamin  Thrombocytopenia (HCC)  Assessment & Plan  Plt count currently 214 from 121 on 4/6  Asymptomatic  Currently on lovenox injections  No acute s/s of bleeding  Resolved  Will continue to trend  Vitamin D deficiency  Assessment & Plan  Last Vitamin D level was 14 in 2019  Vitamin D level 12 4 on 4/8  Started on Vitamin D supplementation, 50,000u weekly  Continue for 6 weeks and follow-up with PCP  Dizziness  Assessment & Plan  Experienced orthostasis in the acute setting that resolved after IV fluids  Monitor BP with routine VS   Encourage fluid intake  Orthostatic BPs obtained  Currently asymptomatic  Obesity  Assessment & Plan  Nutritional counseling as needed  Gastric sleeve surgery 5 years ago  Recently started to gain weight, Endocrine ordered Contrave for patient, but she would prefer nutritional counseling  Has an appointment to follow-up with Weight Management  Left wrist distal radius nondisplaced fracture  Assessment & Plan  4/3 - R tibial plateau fracture and L nondisplaced wrist fracture s/p mechanical fall   + Head strike, LOC  CT head - no acute abnormalities  L wrist NWB, Sugar tong splint    Platform walker on L - allowed to bear weight on L elbow  Pain management  Follow-up with Ortho (Dr Lisa Bocanegra) with repeat x-rays  (4/12)    X-ray on 4/12: Nondisplaced distal radius fracture is not well visualized  Alignment is near anatomic  PT/OT Therapies  Primary team following  GERD (gastroesophageal reflux disease)  Assessment & Plan  Takes Pepcid 20mg daily at home  Non-formulary  Continue with Protonix 20mg daily  Hypothyroidism  Assessment & Plan  Currently taking levothyroxine 175mcg  Dose decreased by Endocrinology in March  Last TSH was 0 14 and T4 was 1 57 on 1/28  Continue to follow-up with Endocrinology as outpatient  TSH 2 190 on 4/13  Continue current levothyroxine dose  * Tibial plateau fracture  Assessment & Plan  4/3 - R tibial plateau fracture and L nondisplaced wrist fracture s/p mechanical fall   + Head strike, LOC  CT head - no acute abnormalities  Pain management  L wrist NWB, Sugar tong splint  RLE NWB, TROM brace locked in extension  Platform walker on L - allowed to bear weight on L elbow  Follow-up with Ortho (Dr Lisa Bocanegra) with repeat x-rays  (4/12)    X-ray on 4/12 - "Nondisplaced fracture of the proximal tibial metaphysis with intra-articular extension along the lateral tibial eminence, unchanged "    PT/OT Therapies  Primary team following  VTE Pharmacologic Prophylaxis:   Pharmacologic: Enoxaparin (Lovenox)  Mechanical VTE Prophylaxis in Place: Yes - sequential compression devices to LLE  Current Length of Stay: 9 day(s)    Current Patient Status: Inpatient Rehab     Discharge Plan: As per primary team     Code Status: Level 1 - Full Code    Subjective:   Pt examined while sitting in WC in pt room  Complaints of RLE pain, 9/10, aching/throbbing, due for pain medication - states that it usually helps  Mild pain to L wrist, 3/10, aching, improves with pain medication  Therapy is going well  Plans for discharge tomorrow  Currently has no questions about her medications    Currently does not take a multivitamin at home but agrees that she should start to take them  Daughter is coming in later today for Lovenox teaching  Objective:     Vitals:   Temp (24hrs), Av 9 °F (36 6 °C), Min:97 6 °F (36 4 °C), Max:98 2 °F (36 8 °C)    Temp:  [97 6 °F (36 4 °C)-98 2 °F (36 8 °C)] 97 6 °F (36 4 °C)  HR:  [80-93] 80  Resp:  [18] 18  BP: (109-122)/(53-60) 122/60  SpO2:  [98 %-100 %] 98 %  Body mass index is 37 33 kg/m²  Review of Systems   Constitutional: Negative for chills, fatigue and fever  Respiratory: Negative for cough and shortness of breath  Cardiovascular: Negative for chest pain, palpitations and leg swelling  Gastrointestinal: Negative for abdominal distention, abdominal pain, constipation, diarrhea, nausea and vomiting  LBM 4/15   Genitourinary: Negative for difficulty urinating  Musculoskeletal: Positive for arthralgias (RLE pain, 9/10, L wrist 3/10, aching throbbing, improves with pain medication  )  Neurological: Negative for dizziness and light-headedness  Psychiatric/Behavioral: Negative for sleep disturbance  Input and Output Summary (last 24 hours): Intake/Output Summary (Last 24 hours) at 2021 0852  Last data filed at 4/15/2021 1300  Gross per 24 hour   Intake 240 ml   Output --   Net 240 ml       Physical Exam:     Physical Exam  Vitals signs and nursing note reviewed  Constitutional:       Appearance: Normal appearance  She is obese  HENT:      Head: Normocephalic and atraumatic  Cardiovascular:      Rate and Rhythm: Normal rate and regular rhythm  Pulses: Normal pulses  Heart sounds: Murmur present  Systolic murmur present with a grade of 2/6  No friction rub  Pulmonary:      Effort: Pulmonary effort is normal  No respiratory distress  Breath sounds: Normal breath sounds  No wheezing or rhonchi  Abdominal:      General: Abdomen is flat  Bowel sounds are normal  There is no distension        Palpations: Abdomen is soft       Tenderness: There is no abdominal tenderness  Musculoskeletal:      Right lower leg: Edema (Minimal non-pitting edema) present  Left lower leg: No edema  Comments: L wrist in splint  RLE in immobilizer  Skin:     General: Skin is warm and dry  Neurological:      Mental Status: She is alert and oriented to person, place, and time     Psychiatric:         Mood and Affect: Mood normal          Behavior: Behavior normal          Additional Data:     Labs:    Results from last 7 days   Lab Units 04/15/21  0604 04/12/21  0640   WBC Thousand/uL 5 00 4 20*   HEMOGLOBIN g/dL 11 4* 11 5*   HEMATOCRIT % 33 9* 34 4*   PLATELETS Thousands/uL 214 170   NEUTROS PCT %  --  53   LYMPHS PCT %  --  33   MONOS PCT %  --  11*   EOS PCT %  --  3     Results from last 7 days   Lab Units 04/15/21  0604   SODIUM mmol/L 136*   POTASSIUM mmol/L 4 2   CHLORIDE mmol/L 102   CO2 mmol/L 31*   BUN mg/dL 15   CREATININE mg/dL 0 59*   ANION GAP mmol/L 3*   CALCIUM mg/dL 9 9   GLUCOSE RANDOM mg/dL 96                       Labs reviewed    Imaging:    Imaging reviewed    Recent Cultures (last 7 days):           Last 24 Hours Medication List:   Current Facility-Administered Medications   Medication Dose Route Frequency Provider Last Rate    acetaminophen  650 mg Oral Q6H PRN Trever Chiang MD      aluminum-magnesium hydroxide-simethicone  30 mL Oral Q6H PRN Trever Chiang MD      ascorbic acid  500 mg Oral Daily ELEUTERIO Ceballos      bisacodyl  10 mg Rectal Daily PRN Trever Chiang MD      calcium carbonate  1,000 mg Oral Daily PRN Trever Chiang MD      cyclobenzaprine  5 mg Oral TID PRN Trever Chiang MD      docusate sodium  100 mg Oral BID Trever Chiang MD      enoxaparin  40 mg Subcutaneous Q24H [facility] ELEUTERIO Ceballos      ergocalciferol  50,000 Units Oral Weekly ELEUTERIO Ceballos      ferrous sulfate  325 mg Oral Daily With Breakfast ELEUTERIO Ceballos      levothyroxine  175 mcg Oral Early Morning Chasity G Ayla Lagunas MD      multivitamin-minerals  1 tablet Oral Daily ELEUTERIO Estrada      ondansetron  4 mg Oral Q6H PRN Nevaeh Kapadia MD      oxyCODONE  2 5 mg Oral Q4H PRN Nevaeh Kapadia MD      oxyCODONE  5 mg Oral Q4H PRN Nevaeh Kapadia MD      pantoprazole  20 mg Oral Early Morning Nevaeh Kapadia MD      polyethylene glycol  17 g Oral Daily PRN Nevaeh Kapadia MD      simethicone  80 mg Oral 4x Daily PRN Nevaeh Kapadia MD          M*Modal software was used to dictate this note  It may contain errors with dictating incorrect words or incorrect spelling  Please contact the provider directly with any questions

## 2021-04-16 NOTE — PLAN OF CARE
Problem: PAIN - ADULT  Goal: Verbalizes/displays adequate comfort level or baseline comfort level  Description: Interventions:  - Encourage patient to monitor pain and request assistance  - Assess pain using appropriate pain scale  - Administer analgesics based on type and severity of pain and evaluate response  - Implement non-pharmacological measures as appropriate and evaluate response  - Consider cultural and social influences on pain and pain management  - Notify physician/advanced practitioner if interventions unsuccessful or patient reports new pain  Outcome: Progressing     Problem: INFECTION - ADULT  Goal: Absence or prevention of progression during hospitalization  Description: INTERVENTIONS:  - Assess and monitor for signs and symptoms of infection  - Monitor lab/diagnostic results  - Monitor all insertion sites, i e  indwelling lines, tubes, and drains  - Monitor endotracheal if appropriate and nasal secretions for changes in amount and color  - [place] appropriate cooling/warming therapies per order  - Administer medications as ordered  - Instruct and encourage patient and family to use good hand hygiene technique  - Identify and instruct in appropriate isolation precautions for identified infection/condition  Outcome: Progressing  Goal: Absence of fever/infection during neutropenic period  Description: INTERVENTIONS:  - Monitor WBC    Outcome: Progressing     Problem: SAFETY ADULT  Goal: Patient will remain free of falls  Description: INTERVENTIONS:  - Assess patient frequently for physical needs  -  Identify cognitive and physical deficits and behaviors that affect risk of falls    -  [place] fall precautions as indicated by assessment   - Educate patient/family on patient safety including physical limitations  - Instruct patient to call for assistance with activity based on assessment  - Modify environment to reduce risk of injury  - Consider OT/PT consult to assist with strengthening/mobility  Outcome: Progressing  Goal: Maintain or return to baseline ADL function  Description: INTERVENTIONS:  -  Assess patient's ability to carry out ADLs; assess patient's baseline for ADL function and identify physical deficits which impact ability to perform ADLs (bathing, care of mouth/teeth, toileting, grooming, dressing, etc )  - Assess/evaluate cause of self-care deficits   - Assess range of motion  - Assess patient's mobility; develop plan if impaired  - Assess patient's need for assistive devices and provide as appropriate  - Encourage maximum independence but intervene and supervise when necessary  - Involve family in performance of ADLs  - Assess for home care needs following discharge   - Consider OT consult to assist with ADL evaluation and planning for discharge  - Provide patient education as appropriate  Outcome: Progressing  Goal: Maintain or return mobility status to optimal level  Description: INTERVENTIONS:  - Assess patient's baseline mobility status (ambulation, transfers, stairs, etc )    - Identify cognitive and physical deficits and behaviors that affect mobility  - Identify mobility aids required to assist with transfers and/or ambulation (gait belt, sit-to-stand, lift, walker, cane, etc )  - [place] fall precautions as indicated by assessment  - Record patient progress and toleration of activity level on Mobility SBAR; progress patient to next Phase/Stage  - Instruct patient to call for assistance with activity based on assessment  - Consider rehabilitation consult to assist with strengthening/weightbearing, etc   Outcome: Progressing     Problem: DISCHARGE PLANNING  Goal: Discharge to home or other facility with appropriate resources  Description: INTERVENTIONS:  - Identify barriers to discharge w/patient and caregiver  - Arrange for needed discharge resources and transportation as appropriate  - Identify discharge learning needs (meds, wound care, etc )  - Arrange for interpretive services to assist at discharge as needed  - Refer to Case Management Department for coordinating discharge planning if the patient needs post-hospital services based on physician/advanced practitioner order or complex needs related to functional status, cognitive ability, or social support system  Outcome: Progressing     Problem: Knowledge Deficit  Goal: Patient/family/caregiver demonstrates understanding of disease process, treatment plan, medications, and discharge instructions  Description: Complete learning assessment and assess knowledge base  Interventions:  - Provide teaching at level of understanding  - Provide teaching via preferred learning methods  Outcome: Progressing     Problem: Potential for Falls  Goal: Patient will remain free of falls  Description: INTERVENTIONS:  - Assess patient frequently for physical needs  -  Identify cognitive and physical deficits and behaviors that affect risk of falls    -  [place] fall precautions as indicated by assessment   - Educate patient/family on patient safety including physical limitations  - Instruct patient to call for assistance with activity based on assessment  - Modify environment to reduce risk of injury  - Consider OT/PT consult to assist with strengthening/mobility  Outcome: Progressing     Problem: Prexisting or High Potential for Compromised Skin Integrity  Goal: Skin integrity is maintained or improved  Description: INTERVENTIONS:  - Identify patients at risk for skin breakdown  - Assess and monitor skin integrity  - Assess and monitor nutrition and hydration status  - Monitor labs   - Assess for incontinence   - Turn and reposition patient  - Assist with mobility/ambulation  - Relieve pressure over bony prominences  - Avoid friction and shearing  - Provide appropriate hygiene as needed including keeping skin clean and dry  - Evaluate need for skin moisturizer/barrier cream  - Collaborate with interdisciplinary team   - Patient/family teaching  - Consider wound care consult   Outcome: Progressing     Problem: GASTROINTESTINAL - ADULT  Goal: Minimal or absence of nausea and/or vomiting  Description: INTERVENTIONS:  - Administer IV fluids if ordered to ensure adequate hydration  - Maintain NPO status until nausea and vomiting are resolved  - Nasogastric tube if ordered  - Administer ordered antiemetic medications as needed  - Provide nonpharmacologic comfort measures as appropriate  - Advance diet as tolerated, if ordered  - Consider nutrition services referral to assist patient with adequate nutrition and appropriate food choices  Outcome: Progressing  Goal: Maintains or returns to baseline bowel function  Description: INTERVENTIONS:  - Assess bowel function  - Encourage oral fluids to ensure adequate hydration  - Administer IV fluids if ordered to ensure adequate hydration  - Administer ordered medications as needed  - Encourage mobilization and activity  - Consider nutritional services referral to assist patient with adequate nutrition and appropriate food choices  Outcome: Progressing  Goal: Maintains adequate nutritional intake  Description: INTERVENTIONS:  - Monitor percentage of each meal consumed  - Identify factors contributing to decreased intake, treat as appropriate  - Assist with meals as needed  - Monitor I&O, weight, and lab values if indicated  - Obtain nutrition services referral as needed  Outcome: Progressing     Problem: GENITOURINARY - ADULT  Goal: Maintains or returns to baseline urinary function  Description: INTERVENTIONS:  - Assess urinary function  - Encourage oral fluids to ensure adequate hydration if ordered  - Administer IV fluids as ordered to ensure adequate hydration  - Administer ordered medications as needed  - Offer frequent toileting  - Follow urinary retention protocol if ordered  Outcome: Progressing     Problem: METABOLIC, FLUID AND ELECTROLYTES - ADULT  Goal: Electrolytes maintained within normal limits  Description: INTERVENTIONS:  - Monitor labs and assess patient for signs and symptoms of electrolyte imbalances  - Administer electrolyte replacement as ordered  - Monitor response to electrolyte replacements, including repeat lab results as appropriate  - Instruct patient on fluid and nutrition as appropriate  Outcome: Progressing  Goal: Fluid balance maintained  Description: INTERVENTIONS:  - Monitor labs   - Monitor I/O and WT  - Instruct patient on fluid and nutrition as appropriate  - Assess for signs & symptoms of volume excess or deficit  Outcome: Progressing     Problem: SKIN/TISSUE INTEGRITY - ADULT  Goal: Skin integrity remains intact  Description: INTERVENTIONS  - Identify patients at risk for skin breakdown  - Assess and monitor skin integrity  - Assess and monitor nutrition and hydration status  - Monitor labs (i e  albumin)  - Assess for incontinence   - Turn and reposition patient  - Assist with mobility/ambulation  - Relieve pressure over bony prominences  - Avoid friction and shearing  - Provide appropriate hygiene as needed including keeping skin clean and dry  - Evaluate need for skin moisturizer/barrier cream  - Collaborate with interdisciplinary team (i e  Nutrition, Rehabilitation, etc )   - Patient/family teaching  Outcome: Progressing     Problem: HEMATOLOGIC - ADULT  Goal: Maintains hematologic stability  Description: INTERVENTIONS  - Assess for signs and symptoms of bleeding or hemorrhage  - Monitor labs  - Administer supportive blood products/factors as ordered and appropriate  Outcome: Progressing     Problem: MUSCULOSKELETAL - ADULT  Goal: Maintain or return mobility to safest level of function  Description: INTERVENTIONS:  - Assess patient's ability to carry out ADLs; assess patient's baseline for ADL function and identify physical deficits which impact ability to perform ADLs (bathing, care of mouth/teeth, toileting, grooming, dressing, etc )  - Assess/evaluate cause of self-care deficits   - Assess range of motion  - Assess patient's mobility  - Assess patient's need for assistive devices and provide as appropriate  - Encourage maximum independence but intervene and supervise when necessary  - Involve family in performance of ADLs  - Assess for home care needs following discharge   - Consider OT consult to assist with ADL evaluation and planning for discharge  - Provide patient education as appropriate  Outcome: Progressing  Goal: Maintain proper alignment of affected body part  Description: INTERVENTIONS:  - Support, maintain and protect limb and body alignment  - Provide patient/ family with appropriate education  Outcome: Progressing     Problem: Prexisting or High Potential for Compromised Skin Integrity  Goal: Skin integrity is maintained or improved  Description: INTERVENTIONS:  - Identify patients at risk for skin breakdown  - Assess and monitor skin integrity  - Assess and monitor nutrition and hydration status  - Monitor labs   - Assess for incontinence   - Turn and reposition patient  - Assist with mobility/ambulation  - Relieve pressure over bony prominences  - Avoid friction and shearing  - Provide appropriate hygiene as needed including keeping skin clean and dry  - Evaluate need for skin moisturizer/barrier cream  - Collaborate with interdisciplinary team   - Patient/family teaching  - Consider wound care consult   Outcome: Progressing

## 2021-04-16 NOTE — CASE MANAGEMENT
Met with the patient to discuss her discharge tomorrow  She has made good progress during her rehab course and is returning to her daughter's house that is more accessible than her own  Her daughter will be working during the day but will be at home with her on evenings  Patient confirmed that she received the wheelchair and platform attachment  Reviewed that she will be receiving home health PT through [facility] [employer]  Her daughter will be picking her up tomorrow  Patient denies any concerns or questions regarding her DC home  Patient was instructed on how to contact unit after discharge with questions

## 2021-04-17 VITALS
RESPIRATION RATE: 20 BRPM | HEIGHT: 65 IN | BODY MASS INDEX: 37.37 KG/M2 | SYSTOLIC BLOOD PRESSURE: 109 MMHG | DIASTOLIC BLOOD PRESSURE: 63 MMHG | OXYGEN SATURATION: 98 % | WEIGHT: 224.3 LBS | TEMPERATURE: 98.7 F | HEART RATE: 71 BPM

## 2021-04-17 PROCEDURE — 99239 HOSP IP/OBS DSCHRG MGMT >30: CPT | Performed by: PHYSICAL MEDICINE & REHABILITATION

## 2021-04-17 RX ADMIN — Medication 1 TABLET: at 08:26

## 2021-04-17 RX ADMIN — LEVOTHYROXINE SODIUM 175 MCG: 0.15 TABLET ORAL at 06:23

## 2021-04-17 RX ADMIN — FERROUS SULFATE TAB 325 MG (65 MG ELEMENTAL FE) 325 MG: 325 (65 FE) TAB at 08:26

## 2021-04-17 RX ADMIN — OXYCODONE HYDROCHLORIDE AND ACETAMINOPHEN 500 MG: 500 TABLET ORAL at 08:26

## 2021-04-17 RX ADMIN — PANTOPRAZOLE SODIUM 20 MG: 20 TABLET, DELAYED RELEASE ORAL at 06:23

## 2021-04-17 RX ADMIN — OXYCODONE HYDROCHLORIDE 5 MG: 5 TABLET ORAL at 09:37

## 2021-04-17 NOTE — PLAN OF CARE
Problem: PAIN - ADULT  Goal: Verbalizes/displays adequate comfort level or baseline comfort level  Description: Interventions:  - Encourage patient to monitor pain and request assistance  - Assess pain using appropriate pain scale  - Administer analgesics based on type and severity of pain and evaluate response  - Implement non-pharmacological measures as appropriate and evaluate response  - Consider cultural and social influences on pain and pain management  - Notify physician/advanced practitioner if interventions unsuccessful or patient reports new pain  4/17/2021 1014 by Татьяна Andino RN  Outcome: Adequate for Discharge  4/17/2021 0735 by Татьяна Andino RN  Outcome: Progressing     Problem: INFECTION - ADULT  Goal: Absence or prevention of progression during hospitalization  Description: INTERVENTIONS:  - Assess and monitor for signs and symptoms of infection  - Monitor lab/diagnostic results  - Monitor all insertion sites, i e  indwelling lines, tubes, and drains  - Monitor endotracheal if appropriate and nasal secretions for changes in amount and color  - [place] appropriate cooling/warming therapies per order  - Administer medications as ordered  - Instruct and encourage patient and family to use good hand hygiene technique  - Identify and instruct in appropriate isolation precautions for identified infection/condition  4/17/2021 1014 by Татьяна Andino RN  Outcome: Adequate for Discharge  4/17/2021 0735 by Татьяна Andino RN  Outcome: Progressing  Goal: Absence of fever/infection during neutropenic period  Description: INTERVENTIONS:  - Monitor WBC    4/17/2021 1014 by Татьяна Andino RN  Outcome: Adequate for Discharge  4/17/2021 0735 by Татьяна Andino RN  Outcome: Progressing     Problem: SAFETY ADULT  Goal: Patient will remain free of falls  Description: INTERVENTIONS:  - Assess patient frequently for physical needs  -  Identify cognitive and physical deficits and behaviors that affect risk of falls    -  [place] fall precautions as indicated by assessment   - Educate patient/family on patient safety including physical limitations  - Instruct patient to call for assistance with activity based on assessment  - Modify environment to reduce risk of injury  - Consider OT/PT consult to assist with strengthening/mobility  4/17/2021 1014 by Gogo Munguia RN  Outcome: Adequate for Discharge  4/17/2021 0735 by Gogo Munguia RN  Outcome: Progressing  Goal: Maintain or return to baseline ADL function  Description: INTERVENTIONS:  -  Assess patient's ability to carry out ADLs; assess patient's baseline for ADL function and identify physical deficits which impact ability to perform ADLs (bathing, care of mouth/teeth, toileting, grooming, dressing, etc )  - Assess/evaluate cause of self-care deficits   - Assess range of motion  - Assess patient's mobility; develop plan if impaired  - Assess patient's need for assistive devices and provide as appropriate  - Encourage maximum independence but intervene and supervise when necessary  - Involve family in performance of ADLs  - Assess for home care needs following discharge   - Consider OT consult to assist with ADL evaluation and planning for discharge  - Provide patient education as appropriate  4/17/2021 1014 by Gogo Munguia RN  Outcome: Adequate for Discharge  4/17/2021 0735 by Gogo Munguia RN  Outcome: Progressing  Goal: Maintain or return mobility status to optimal level  Description: INTERVENTIONS:  - Assess patient's baseline mobility status (ambulation, transfers, stairs, etc )    - Identify cognitive and physical deficits and behaviors that affect mobility  - Identify mobility aids required to assist with transfers and/or ambulation (gait belt, sit-to-stand, lift, walker, cane, etc )  - [place] fall precautions as indicated by assessment  - Record patient progress and toleration of activity level on Mobility SBAR; progress patient to next Phase/Stage  - Instruct patient to call for assistance with activity based on assessment  - Consider rehabilitation consult to assist with strengthening/weightbearing, etc   4/17/2021 1014 by Satya Soni RN  Outcome: Adequate for Discharge  4/17/2021 0735 by Satya Soni RN  Outcome: Progressing     Problem: DISCHARGE PLANNING  Goal: Discharge to home or other facility with appropriate resources  Description: INTERVENTIONS:  - Identify barriers to discharge w/patient and caregiver  - Arrange for needed discharge resources and transportation as appropriate  - Identify discharge learning needs (meds, wound care, etc )  - Arrange for interpretive services to assist at discharge as needed  - Refer to Case Management Department for coordinating discharge planning if the patient needs post-hospital services based on physician/advanced practitioner order or complex needs related to functional status, cognitive ability, or social support system  4/17/2021 1014 by Satya Soni RN  Outcome: Adequate for Discharge  4/17/2021 0735 by Satya Soni RN  Outcome: Progressing     Problem: Knowledge Deficit  Goal: Patient/family/caregiver demonstrates understanding of disease process, treatment plan, medications, and discharge instructions  Description: Complete learning assessment and assess knowledge base  Interventions:  - Provide teaching at level of understanding  - Provide teaching via preferred learning methods  4/17/2021 1014 by Satya Soni RN  Outcome: Adequate for Discharge  4/17/2021 0735 by Satya Soni RN  Outcome: Progressing     Problem: Potential for Falls  Goal: Patient will remain free of falls  Description: INTERVENTIONS:  - Assess patient frequently for physical needs  -  Identify cognitive and physical deficits and behaviors that affect risk of falls    -  [place] fall precautions as indicated by assessment   - Educate patient/family on patient safety including physical limitations  - Instruct patient to call for assistance with activity based on assessment  - Modify environment to reduce risk of injury  - Consider OT/PT consult to assist with strengthening/mobility  4/17/2021 1014 by Татьяна Andino RN  Outcome: Adequate for Discharge  4/17/2021 0735 by Татьяна Andino RN  Outcome: Progressing     Problem: Prexisting or High Potential for Compromised Skin Integrity  Goal: Skin integrity is maintained or improved  Description: INTERVENTIONS:  - Identify patients at risk for skin breakdown  - Assess and monitor skin integrity  - Assess and monitor nutrition and hydration status  - Monitor labs   - Assess for incontinence   - Turn and reposition patient  - Assist with mobility/ambulation  - Relieve pressure over bony prominences  - Avoid friction and shearing  - Provide appropriate hygiene as needed including keeping skin clean and dry  - Evaluate need for skin moisturizer/barrier cream  - Collaborate with interdisciplinary team   - Patient/family teaching  - Consider wound care consult   4/17/2021 1014 by Татьяна Andino RN  Outcome: Adequate for Discharge  4/17/2021 0735 by Татьяна Andino RN  Outcome: Progressing     Problem: GASTROINTESTINAL - ADULT  Goal: Minimal or absence of nausea and/or vomiting  Description: INTERVENTIONS:  - Administer IV fluids if ordered to ensure adequate hydration  - Maintain NPO status until nausea and vomiting are resolved  - Nasogastric tube if ordered  - Administer ordered antiemetic medications as needed  - Provide nonpharmacologic comfort measures as appropriate  - Advance diet as tolerated, if ordered  - Consider nutrition services referral to assist patient with adequate nutrition and appropriate food choices  4/17/2021 1014 by Татьяна Andino RN  Outcome: Adequate for Discharge  4/17/2021 0735 by Татьяна Andino RN  Outcome: Progressing  Goal: Maintains or returns to baseline bowel function  Description: INTERVENTIONS:  - Assess bowel function  - Encourage oral fluids to ensure adequate hydration  - Administer IV fluids if ordered to ensure adequate hydration  - Administer ordered medications as needed  - Encourage mobilization and activity  - Consider nutritional services referral to assist patient with adequate nutrition and appropriate food choices  4/17/2021 1014 by Татьяна Andino RN  Outcome: Adequate for Discharge  4/17/2021 0735 by Татьяна Andino RN  Outcome: Progressing  Goal: Maintains adequate nutritional intake  Description: INTERVENTIONS:  - Monitor percentage of each meal consumed  - Identify factors contributing to decreased intake, treat as appropriate  - Assist with meals as needed  - Monitor I&O, weight, and lab values if indicated  - Obtain nutrition services referral as needed  4/17/2021 1014 by Татьяна Andino RN  Outcome: Adequate for Discharge  4/17/2021 0735 by Татьяна Andino RN  Outcome: Progressing     Problem: GENITOURINARY - ADULT  Goal: Maintains or returns to baseline urinary function  Description: INTERVENTIONS:  - Assess urinary function  - Encourage oral fluids to ensure adequate hydration if ordered  - Administer IV fluids as ordered to ensure adequate hydration  - Administer ordered medications as needed  - Offer frequent toileting  - Follow urinary retention protocol if ordered  4/17/2021 1014 by Татьяна Andino RN  Outcome: Adequate for Discharge  4/17/2021 0735 by Татьяна Andino RN  Outcome: Progressing     Problem: METABOLIC, FLUID AND ELECTROLYTES - ADULT  Goal: Electrolytes maintained within normal limits  Description: INTERVENTIONS:  - Monitor labs and assess patient for signs and symptoms of electrolyte imbalances  - Administer electrolyte replacement as ordered  - Monitor response to electrolyte replacements, including repeat lab results as appropriate  - Instruct patient on fluid and nutrition as appropriate  4/17/2021 1014 by Татьяна Andino RN  Outcome: Adequate for Discharge  4/17/2021 0735 by Kaitlin Iglesias RN  Outcome: Progressing  Goal: Fluid balance maintained  Description: INTERVENTIONS:  - Monitor labs   - Monitor I/O and WT  - Instruct patient on fluid and nutrition as appropriate  - Assess for signs & symptoms of volume excess or deficit  4/17/2021 1014 by Kiatlin Iglesias RN  Outcome: Adequate for Discharge  4/17/2021 0735 by Kaitlin Iglesias RN  Outcome: Progressing     Problem: SKIN/TISSUE INTEGRITY - ADULT  Goal: Skin integrity remains intact  Description: INTERVENTIONS  - Identify patients at risk for skin breakdown  - Assess and monitor skin integrity  - Assess and monitor nutrition and hydration status  - Monitor labs (i e  albumin)  - Assess for incontinence   - Turn and reposition patient  - Assist with mobility/ambulation  - Relieve pressure over bony prominences  - Avoid friction and shearing  - Provide appropriate hygiene as needed including keeping skin clean and dry  - Evaluate need for skin moisturizer/barrier cream  - Collaborate with interdisciplinary team (i e  Nutrition, Rehabilitation, etc )   - Patient/family teaching  4/17/2021 1014 by Kaitlin Iglesias RN  Outcome: Adequate for Discharge  4/17/2021 0735 by Kaitlin Iglesias RN  Outcome: Progressing     Problem: HEMATOLOGIC - ADULT  Goal: Maintains hematologic stability  Description: INTERVENTIONS  - Assess for signs and symptoms of bleeding or hemorrhage  - Monitor labs  - Administer supportive blood products/factors as ordered and appropriate  4/17/2021 1014 by Kaitlin Iglesias RN  Outcome: Adequate for Discharge  4/17/2021 0735 by Kaitlin Iglesias RN  Outcome: Progressing     Problem: MUSCULOSKELETAL - ADULT  Goal: Maintain or return mobility to safest level of function  Description: INTERVENTIONS:  - Assess patient's ability to carry out ADLs; assess patient's baseline for ADL function and identify physical deficits which impact ability to perform ADLs (bathing, care of mouth/teeth, toileting, grooming, dressing, etc )  - Assess/evaluate cause of self-care deficits   - Assess range of motion  - Assess patient's mobility  - Assess patient's need for assistive devices and provide as appropriate  - Encourage maximum independence but intervene and supervise when necessary  - Involve family in performance of ADLs  - Assess for home care needs following discharge   - Consider OT consult to assist with ADL evaluation and planning for discharge  - Provide patient education as appropriate  4/17/2021 1014 by Suman Morrell RN  Outcome: Adequate for Discharge  4/17/2021 0735 by Suman Morrell RN  Outcome: Progressing  Goal: Maintain proper alignment of affected body part  Description: INTERVENTIONS:  - Support, maintain and protect limb and body alignment  - Provide patient/ family with appropriate education  4/17/2021 1014 by Suman Morrell RN  Outcome: Adequate for Discharge  4/17/2021 0735 by Suman Morrell RN  Outcome: Progressing     Problem: Prexisting or High Potential for Compromised Skin Integrity  Goal: Skin integrity is maintained or improved  Description: INTERVENTIONS:  - Identify patients at risk for skin breakdown  - Assess and monitor skin integrity  - Assess and monitor nutrition and hydration status  - Monitor labs   - Assess for incontinence   - Turn and reposition patient  - Assist with mobility/ambulation  - Relieve pressure over bony prominences  - Avoid friction and shearing  - Provide appropriate hygiene as needed including keeping skin clean and dry  - Evaluate need for skin moisturizer/barrier cream  - Collaborate with interdisciplinary team   - Patient/family teaching  - Consider wound care consult   4/17/2021 1014 by Suman Morrell RN  Outcome: Adequate for Discharge  4/17/2021 0735 by Suman Morrell RN  Outcome: Progressing

## 2021-04-17 NOTE — PROGRESS NOTES
04/17/21 1100   Car Transfer   Type of Assistance Needed Independent; Adaptive equipment   Car Transfer CARE Score 6       Assisted pt outside to her daughter's car to d/c home  Pt able to perform car transfer at [address] with use of platform RW  Pt and her daughter had no questions at time of d/c

## 2021-04-17 NOTE — PLAN OF CARE
Problem: PAIN - ADULT  Goal: Verbalizes/displays adequate comfort level or baseline comfort level  Description: Interventions:  - Encourage patient to monitor pain and request assistance  - Assess pain using appropriate pain scale  - Administer analgesics based on type and severity of pain and evaluate response  - Implement non-pharmacological measures as appropriate and evaluate response  - Consider cultural and social influences on pain and pain management  - Notify physician/advanced practitioner if interventions unsuccessful or patient reports new pain  Outcome: Progressing     Problem: INFECTION - ADULT  Goal: Absence or prevention of progression during hospitalization  Description: INTERVENTIONS:  - Assess and monitor for signs and symptoms of infection  - Monitor lab/diagnostic results  - Monitor all insertion sites, i e  indwelling lines, tubes, and drains  - Monitor endotracheal if appropriate and nasal secretions for changes in amount and color  - [place] appropriate cooling/warming therapies per order  - Administer medications as ordered  - Instruct and encourage patient and family to use good hand hygiene technique  - Identify and instruct in appropriate isolation precautions for identified infection/condition  Outcome: Progressing  Goal: Absence of fever/infection during neutropenic period  Description: INTERVENTIONS:  - Monitor WBC    Outcome: Progressing     Problem: SAFETY ADULT  Goal: Patient will remain free of falls  Description: INTERVENTIONS:  - Assess patient frequently for physical needs  -  Identify cognitive and physical deficits and behaviors that affect risk of falls    -  [place] fall precautions as indicated by assessment   - Educate patient/family on patient safety including physical limitations  - Instruct patient to call for assistance with activity based on assessment  - Modify environment to reduce risk of injury  - Consider OT/PT consult to assist with strengthening/mobility  Outcome: Progressing  Goal: Maintain or return to baseline ADL function  Description: INTERVENTIONS:  -  Assess patient's ability to carry out ADLs; assess patient's baseline for ADL function and identify physical deficits which impact ability to perform ADLs (bathing, care of mouth/teeth, toileting, grooming, dressing, etc )  - Assess/evaluate cause of self-care deficits   - Assess range of motion  - Assess patient's mobility; develop plan if impaired  - Assess patient's need for assistive devices and provide as appropriate  - Encourage maximum independence but intervene and supervise when necessary  - Involve family in performance of ADLs  - Assess for home care needs following discharge   - Consider OT consult to assist with ADL evaluation and planning for discharge  - Provide patient education as appropriate  Outcome: Progressing  Goal: Maintain or return mobility status to optimal level  Description: INTERVENTIONS:  - Assess patient's baseline mobility status (ambulation, transfers, stairs, etc )    - Identify cognitive and physical deficits and behaviors that affect mobility  - Identify mobility aids required to assist with transfers and/or ambulation (gait belt, sit-to-stand, lift, walker, cane, etc )  - [place] fall precautions as indicated by assessment  - Record patient progress and toleration of activity level on Mobility SBAR; progress patient to next Phase/Stage  - Instruct patient to call for assistance with activity based on assessment  - Consider rehabilitation consult to assist with strengthening/weightbearing, etc   Outcome: Progressing     Problem: DISCHARGE PLANNING  Goal: Discharge to home or other facility with appropriate resources  Description: INTERVENTIONS:  - Identify barriers to discharge w/patient and caregiver  - Arrange for needed discharge resources and transportation as appropriate  - Identify discharge learning needs (meds, wound care, etc )  - Arrange for interpretive services to assist at discharge as needed  - Refer to Case Management Department for coordinating discharge planning if the patient needs post-hospital services based on physician/advanced practitioner order or complex needs related to functional status, cognitive ability, or social support system  Outcome: Progressing     Problem: Knowledge Deficit  Goal: Patient/family/caregiver demonstrates understanding of disease process, treatment plan, medications, and discharge instructions  Description: Complete learning assessment and assess knowledge base  Interventions:  - Provide teaching at level of understanding  - Provide teaching via preferred learning methods  Outcome: Progressing     Problem: Potential for Falls  Goal: Patient will remain free of falls  Description: INTERVENTIONS:  - Assess patient frequently for physical needs  -  Identify cognitive and physical deficits and behaviors that affect risk of falls    -  [place] fall precautions as indicated by assessment   - Educate patient/family on patient safety including physical limitations  - Instruct patient to call for assistance with activity based on assessment  - Modify environment to reduce risk of injury  - Consider OT/PT consult to assist with strengthening/mobility  Outcome: Progressing     Problem: Prexisting or High Potential for Compromised Skin Integrity  Goal: Skin integrity is maintained or improved  Description: INTERVENTIONS:  - Identify patients at risk for skin breakdown  - Assess and monitor skin integrity  - Assess and monitor nutrition and hydration status  - Monitor labs   - Assess for incontinence   - Turn and reposition patient  - Assist with mobility/ambulation  - Relieve pressure over bony prominences  - Avoid friction and shearing  - Provide appropriate hygiene as needed including keeping skin clean and dry  - Evaluate need for skin moisturizer/barrier cream  - Collaborate with interdisciplinary team   - Patient/family teaching  - Consider wound care consult   Outcome: Progressing     Problem: GASTROINTESTINAL - ADULT  Goal: Minimal or absence of nausea and/or vomiting  Description: INTERVENTIONS:  - Administer IV fluids if ordered to ensure adequate hydration  - Maintain NPO status until nausea and vomiting are resolved  - Nasogastric tube if ordered  - Administer ordered antiemetic medications as needed  - Provide nonpharmacologic comfort measures as appropriate  - Advance diet as tolerated, if ordered  - Consider nutrition services referral to assist patient with adequate nutrition and appropriate food choices  Outcome: Progressing  Goal: Maintains or returns to baseline bowel function  Description: INTERVENTIONS:  - Assess bowel function  - Encourage oral fluids to ensure adequate hydration  - Administer IV fluids if ordered to ensure adequate hydration  - Administer ordered medications as needed  - Encourage mobilization and activity  - Consider nutritional services referral to assist patient with adequate nutrition and appropriate food choices  Outcome: Progressing  Goal: Maintains adequate nutritional intake  Description: INTERVENTIONS:  - Monitor percentage of each meal consumed  - Identify factors contributing to decreased intake, treat as appropriate  - Assist with meals as needed  - Monitor I&O, weight, and lab values if indicated  - Obtain nutrition services referral as needed  Outcome: Progressing     Problem: GENITOURINARY - ADULT  Goal: Maintains or returns to baseline urinary function  Description: INTERVENTIONS:  - Assess urinary function  - Encourage oral fluids to ensure adequate hydration if ordered  - Administer IV fluids as ordered to ensure adequate hydration  - Administer ordered medications as needed  - Offer frequent toileting  - Follow urinary retention protocol if ordered  Outcome: Progressing     Problem: METABOLIC, FLUID AND ELECTROLYTES - ADULT  Goal: Electrolytes maintained within normal limits  Description: INTERVENTIONS:  - Monitor labs and assess patient for signs and symptoms of electrolyte imbalances  - Administer electrolyte replacement as ordered  - Monitor response to electrolyte replacements, including repeat lab results as appropriate  - Instruct patient on fluid and nutrition as appropriate  Outcome: Progressing  Goal: Fluid balance maintained  Description: INTERVENTIONS:  - Monitor labs   - Monitor I/O and WT  - Instruct patient on fluid and nutrition as appropriate  - Assess for signs & symptoms of volume excess or deficit  Outcome: Progressing     Problem: SKIN/TISSUE INTEGRITY - ADULT  Goal: Skin integrity remains intact  Description: INTERVENTIONS  - Identify patients at risk for skin breakdown  - Assess and monitor skin integrity  - Assess and monitor nutrition and hydration status  - Monitor labs (i e  albumin)  - Assess for incontinence   - Turn and reposition patient  - Assist with mobility/ambulation  - Relieve pressure over bony prominences  - Avoid friction and shearing  - Provide appropriate hygiene as needed including keeping skin clean and dry  - Evaluate need for skin moisturizer/barrier cream  - Collaborate with interdisciplinary team (i e  Nutrition, Rehabilitation, etc )   - Patient/family teaching  Outcome: Progressing     Problem: HEMATOLOGIC - ADULT  Goal: Maintains hematologic stability  Description: INTERVENTIONS  - Assess for signs and symptoms of bleeding or hemorrhage  - Monitor labs  - Administer supportive blood products/factors as ordered and appropriate  Outcome: Progressing     Problem: MUSCULOSKELETAL - ADULT  Goal: Maintain or return mobility to safest level of function  Description: INTERVENTIONS:  - Assess patient's ability to carry out ADLs; assess patient's baseline for ADL function and identify physical deficits which impact ability to perform ADLs (bathing, care of mouth/teeth, toileting, grooming, dressing, etc )  - Assess/evaluate cause of self-care deficits   - Assess range of motion  - Assess patient's mobility  - Assess patient's need for assistive devices and provide as appropriate  - Encourage maximum independence but intervene and supervise when necessary  - Involve family in performance of ADLs  - Assess for home care needs following discharge   - Consider OT consult to assist with ADL evaluation and planning for discharge  - Provide patient education as appropriate  Outcome: Progressing  Goal: Maintain proper alignment of affected body part  Description: INTERVENTIONS:  - Support, maintain and protect limb and body alignment  - Provide patient/ family with appropriate education  Outcome: Progressing     Problem: Prexisting or High Potential for Compromised Skin Integrity  Goal: Skin integrity is maintained or improved  Description: INTERVENTIONS:  - Identify patients at risk for skin breakdown  - Assess and monitor skin integrity  - Assess and monitor nutrition and hydration status  - Monitor labs   - Assess for incontinence   - Turn and reposition patient  - Assist with mobility/ambulation  - Relieve pressure over bony prominences  - Avoid friction and shearing  - Provide appropriate hygiene as needed including keeping skin clean and dry  - Evaluate need for skin moisturizer/barrier cream  - Collaborate with interdisciplinary team   - Patient/family teaching  - Consider wound care consult   Outcome: Progressing

## 2021-04-19 ENCOUNTER — DOCUMENTATION (OUTPATIENT)
Dept: SOCIAL WORK | Facility: HOSPITAL | Age: 56
End: 2021-04-19

## 2021-04-19 NOTE — PROGRESS NOTES
Notification of Assess not Admit and update for Orthopedic    Hossein November SHAWANDA has assessed your patient for Home Health services and has determined the patient is not eligible for service due to the following    Home PT jennifer performed this session with patient staying with daughter with first floor setup in place and using wc as primary mobility  Has platform walker for transfers as needed  Has all appropriate DME in home and daughter that she is staying with is an OT  Patient is currently sponge bathing  Patient demonstrates appropriate transfers and mobility in home with use of wc and encouraged to perform hep from rehab  Patient is compliant with icing as needed and able to maintain NWB status  Patient is to fu with ortho tomorrow  Based on current status no skilled home PT needs at this time but aware that if needs change a new order would be needed      Pau Life

## 2021-04-19 NOTE — PHYSICAL THERAPY NOTE
[facility] PT DC SUMMARY  Pt is a 63year old female who fell getting out of her car and was admitted to [facility] on 04/03/2021 found to have R knee nondisplaced tibial plateau fracture, mild frontal forehead hematoma, and per xray note taken on 04/03 likely acute nondisplaced distal radial metaphysis fracture  Recommend conservative treatment only at the time  Pt was prescribed a sugar tong splint for L wrist and may only weight bear through elbow  Per chart cleared to use platform walker  Pt is NWB for R LE and prescribed TROM brace locked in extension  PMH includes history of multiple GI surgeries including gastric sleeve  PLOF, pt was independent with all ADLs, IADs, driving, and worked 2 full time jobs  Pt lives in a single level home with 3 MICHELLE (1+1+1), lives with adult son who works full time  Pt DC at mod I level for WC mobility, opting to remain non-ambulatory for comfort and safety  To use PF RW for transfers  Pt provided with PF and WC/cushion, daughter brought in [employer] for PF fitting  Education for HEP provided  Will need A for brace mgmt  Plan for Home PT followup  DC to daughters home, 0 MICHELLE

## 2021-04-20 ENCOUNTER — APPOINTMENT (OUTPATIENT)
Dept: RADIOLOGY | Facility: MEDICAL CENTER | Age: 56
End: 2021-04-20
Payer: COMMERCIAL

## 2021-04-20 ENCOUNTER — OFFICE VISIT (OUTPATIENT)
Dept: OBGYN CLINIC | Facility: MEDICAL CENTER | Age: 56
End: 2021-04-20
Payer: COMMERCIAL

## 2021-04-20 VITALS — HEIGHT: 65 IN | WEIGHT: 224 LBS | BODY MASS INDEX: 37.32 KG/M2

## 2021-04-20 DIAGNOSIS — M25.561 RIGHT KNEE PAIN, UNSPECIFIED CHRONICITY: ICD-10-CM

## 2021-04-20 DIAGNOSIS — S82.121A CLOSED FRACTURE OF LATERAL PORTION OF RIGHT TIBIAL PLATEAU, INITIAL ENCOUNTER: Primary | ICD-10-CM

## 2021-04-20 DIAGNOSIS — M25.532 LEFT WRIST PAIN: ICD-10-CM

## 2021-04-20 DIAGNOSIS — S52.502A CLOSED FRACTURE OF DISTAL END OF LEFT RADIUS, UNSPECIFIED FRACTURE MORPHOLOGY, INITIAL ENCOUNTER: ICD-10-CM

## 2021-04-20 PROCEDURE — 73564 X-RAY EXAM KNEE 4 OR MORE: CPT

## 2021-04-20 PROCEDURE — 99213 OFFICE O/P EST LOW 20 MIN: CPT | Performed by: ORTHOPAEDIC SURGERY

## 2021-04-20 PROCEDURE — 73110 X-RAY EXAM OF WRIST: CPT

## 2021-04-20 NOTE — PROGRESS NOTES
Orthopaedic Surgery - Office Note  Yesica Mendenhall (22 y o  female)   : 1965   MRN: [id number]  Encounter Date: 2021    Assessment / Plan  62 yo female 2 weeks s/p fall with left non-displaced and right tibial plateau fracture  · The diagnosis and treatment options were reviewed  · The risks, benefits, and alternatives were discussed  · Activity as tolerated  · NWB to LUE and RLE  · Short arm cast for LUE  · HKB for RLE  · Begin outpatient PT for quadriceps strengthening and R knee ROM  Corina Zambrano can be unlocked for ROM therapy exercises but on during ambulation  · Tylenol prn pain  Return in about 3 weeks (around 2021)  History of Present Illness  Roderick Lam is a 63 y o  female who presents for a hospital follow up s/p fall on 4/3/2021 with right knee and left knee pain  At that time, she was found to have a non-displaced right tibial plateau and left distal radius fracture and treated non-operatively in a sugartong splint and HKB, with both being NWB  Since then, she has been compliant with her weight bearing status  No new injuries since then, and she feels her pain is improving  No associated numbness or tingling  Review of Systems  Pertinent items are noted in HPI  All other systems were reviewed and are negative  Physical Exam  Ht 5' 5" (1 651 m)   Wt 102 kg (224 lb)   LMP 10/24/2016   BMI 37 28 kg/m²   Cons: Appears well  No apparent distress  Psych: Alert  Oriented x3  Mood and affect normal   Eyes: PERRLA, EOMI  Resp: Normal effort  No audible wheezing or stridor  CV: Palpable pulse  No discernable arrhythmia  No LE edema  Lymph:  No palpable cervical, axillary, or inguinal lymphadenopathy  Skin: Warm  No palpable masses  No visible lesions  Neuro: Normal muscle tone  Normal and symmetric DTR's  Left Hand & Wrist Exam  Alignment:  Normal resting hand posture  Inspection:  Moderate swelling  Minimal volar ecchymosis    Palpation:  Mild tenderness at wrist   ROM:  Not tested  Strength:  Not tested  Stability:  No objective hand or wrist instability  Tests:  (-) Tinel of median nerve at wrist   Neurovascular:  Sensation intact in Ax/R/M/U nerve distributions  2+ radial pulse  Right Knee Exam  Alignment:  Normal knee alignment  Inspection:  MInimal swelling  Palpation:  Mild tenderness at knee  ROM:  Knee Extension 5  Knee Flexion 40  Strength:  Able to fire quadriceps  Stability:  No objective knee instability  Stable Varus / Valgus stress, Lachman, and Posterior drawer  Tests:  No pertinent positive or negative tests  Patella:  Patella tracks centrally without crepitus  Neurovascular:  Sensation intact in DP/SP/Cochran/Sa/T nerve distributions  2+ DP & PT pulses  Gait:  Not tested  Studies Reviewed  XR of left wrist - stable alignment of non-displaced distal radius fracture with evidence of bridging callus  XR of right knee - stable alignment of non-displaced tibial plateau fracture    Procedures  No procedures today  Medical, Surgical, Family, and Social History  The patient's medical history, family history, and social history, were reviewed and updated as appropriate  Past Medical History:   Diagnosis Date    Disease of thyroid gland        Past Surgical History:   Procedure Laterality Date    GASTRIC BYPASS      HERNIA REPAIR         History reviewed  No pertinent family history      Social History     Occupational History    Not on file   Tobacco Use    Smoking status: Never Smoker    Smokeless tobacco: Never Used   Substance and Sexual Activity    Alcohol use: Not Currently     Frequency: Never     Drinks per session: Patient refused     Binge frequency: Never    Drug use: No    Sexual activity: Not Currently       No Known Allergies      Current Outpatient Medications:     acetaminophen (TYLENOL) 325 mg tablet, Take 2 tablets (650 mg total) by mouth every 6 (six) hours as needed for mild pain, Disp: , Rfl: 0    ascorbic acid (VITAMIN C) 500 MG tablet, Take 1 tablet (500 mg total) by mouth daily, Disp: , Rfl: 0    calcium carbonate (TUMS) 500 mg chewable tablet, Chew 2 tablets (1,000 mg total) daily as needed for indigestion or heartburn, Disp: , Rfl: 0    enoxaparin (LOVENOX) 40 mg/0 4 mL, Inject 0 4 mL (40 mg total) under the skin every 24 hours, Disp: 14 Syringe, Rfl: 0    [START ON 4/23/2021] ergocalciferol (VITAMIN D2) 50,000 units, Take 1 capsule (50,000 Units total) by mouth once a week, Disp: 4 capsule, Rfl: 0    ferrous sulfate 325 (65 Fe) mg tablet, Take 1 tablet (325 mg total) by mouth daily with breakfast, Disp: , Rfl: 0    levothyroxine 175 mcg tablet, Take 1 tablet (175 mcg total) by mouth daily in the early morning, Disp: 30 tablet, Rfl: 0    omeprazole (PriLOSEC) 20 mg delayed release capsule, Take 20 mg by mouth daily, Disp: , Rfl:     oxyCODONE (ROXICODONE) 5 mg immediate release tablet, Take 1/2 tablet (2 5mg) or 1 tablet (5mg) every 6 hours as needed for moderate - severe pain, Disp: 30 tablet, Rfl: Anna Ramos MD

## 2021-04-22 NOTE — DISCHARGE SUMMARY
Discharge Summary - PMR   Cris Sanchez 63 y o  female MRN: [id number]  Unit/Bed#: -75 Encounter: [id number]    Admission Date: 4/7/2021     Discharge Date: 4/17/2021    Etiologic/Rehab Diagnosis: Impairment of mobility, safety and Activities of Daily Living (ADLs) due to Orthopedic Disorders:  08 9  Other Orthopedic Right tibial plateu fracture, left radius fracture     History of Present Illness:   Delvin Lujan is a 63 y o  female with hypothyroidism, GERD, gastric sleeve, who presented to the [facility] on 4/3/21 status post traumatic fall upon exiting her car  Sustained a right tibial plateau comminuted nondisplaced fracture, left least distal radius fracture  Evaluated by Orthopedics and treated nonsurgically  Deemed nonweightbearing right lower extremity as well as left wrist (allowed to use platform walker at left elbow)  Placed in a hinged knee brace locked in extension and a sugar-tong splint for the left upper extremity  Patient will require repeat imaging in 1 week  Medically, patient was found to have some orthostasis which was symptomatic and IV fluids  Patient with mild anemia and thrombocytopenia which was followed  Patient also treated for acute nociceptive pain  Patient found to have acute functional her baseline given her nonweightbearing status, and after clearance and stabilization was admitted to [facility] for acute inpatient rehabilitation  Acute Rehabilitation Center Course:   Functional deficits: right lower extremity weakness, left upper extremity weakness, decreased endurance, impaired mobility, self care   All improving after [facility] ARC program   NWB LUE (Left wrist splinted) and NWB RLE (in TROM brace locked in extension)  Was cleared to use platform on left elbow  Patient participated in a comprehensive interdisciplinary inpatient rehabilitation program which included involvment of MD, therapies (PT, OT, and/or SLP), RN, CM, SW, dietary   She was able to be advanced to an overall supervision level of assist at a wheelchair level with mobility, and CGA with self care and considered safe for discharge home with family  Hands on family training completed with her daughter who she will be living with  Please see below for patient's day of discharge function  Patient will be continuing her rehabilitation with home care PT and OT  Physical Therapy Occupational Therapy Speech Therapy   Weight Bearing Status: Non-weight bearing(NWB L UE and R LE)  Transfers: Independent, Supervision(S for STS and bed <>chair)  Bed Mobility: Independent(uses leg  for R LE)  Amulation Distance (ft): 5 feet(hopping on LLE with L platform RW)  Ambulation: Incidental Touching  Assistive Device for Ambulation: Roller Walker  Roller Walker Attachments: With platform on left  Wheelchair Mobility Distance: 250 ft  Wheelchair Mobility: Supervision, Independent  Ramp: Minimal Assistance  Assistive Device for Ramp: Wheelchair  Discharge Recommendations: Home with:  [address] with[de-identified] Home Physical Therapy, Family Support   Eating: (setup)  Grooming: Supervision  Bathing: Incidental Touching  Bathing: Incidental Touching  Upper Body Dressing: Supervision  Lower Body Dressing: Incidental Touching  Toileting: Incidental Touching  Tub/Shower Transfer: (NA Sb)  Toilet Transfer: Incidental Touching  Cognition: Within Defined Limits  Orientation: Person, Place, Time, Situation               Medical Issues Addressed While at [facility]:     DVT prophylaxis  · Managed on subcutaneous Lovenox - Daughter will be performing these for home for an additional 2 weeks      Pain  Managed on p r n  Tylenol, oxycodone IR 2 5 mg-5 mg every 4 hours p r n  for moderate to severe pain  Checked [place]MP; no red flags identified; safe to proceed with prescription     · Continue topical ice and elevation of limb     Bladder plan  · Continent     Bowel plan  · Continent      * Tibial plateau fracture  Assessment & Plan  · Sustained after traumatic fall on 4/3/21  · Treated non-operatively by Orthopedics  · Deemed NWB RLE placed in a hinged knee brace locked in extension  · Follow-up x-ray completed 4/12/21 - reviewed by Ortho - continue current plan of care  · Follow-up with Orthopedics as outpatient     Left wrist distal radius nondisplaced fracture  Assessment & Plan  · Sustained after traumatic fall on 4/3/21  · Treated non-operatively by Orthopedics  · Deemed NWB Left wrist - but allowed platform walker at left elbow  · Placed in a sugar-tong splint  · Follow-up x-ray comleted 4/12/21 - reviewed by Ortho - splint was re-wrapped 4/13/21 with improvement in discomfort  Patient has follow-up appointment on Tuesday 4/21/21  · Follow-up with Orthopedics as outpatient  Vitamin D deficiency  Assessment & Plan  Last Vitamin D level was 14 in 2019  Vitamin D level 12 4 on 4/8  Started on Vitamin D supplementation, 50,000u weekly  Continue for 6 weeks and follow-up with PCP  Anemia  Assessment & Plan  · Stable    History of sleeve gastrectomy  Assessment & Plan  · S/p laparascopic sleeve gastrectomy and,cholecystectomy - Dr Natacha Bravo @ [facility] on 12/9/15    Thrombocytopenia ([address] )  Assessment & Plan  · Resolved    Dizziness  Assessment & Plan  · Resolved  · Was related to mild orthostasis recovered with IV hydration  · In ARC, used TEDS and abdominal binder    GERD (gastroesophageal reflux disease)  Assessment & Plan  · Managed on Protonix (therapeutic exchange for Omeprazole)  · PRN tums and mylanta    Hypothyroidism  Assessment & Plan  · Managed on Synthroid 175mg daily   · Repeat Thyroid labs in 4-6 weeks follow-up with PCP        Discharge Physical Examination:  Physical Exam  Vitals signs and nursing note reviewed  Constitutional:       General: She is not in acute distress  Appearance: She is well-developed  HENT:      Head: Normocephalic        Nose: Nose normal    Eyes:      Conjunctiva/sclera: Conjunctivae normal    Neck:      Musculoskeletal: Neck supple  Cardiovascular:      Rate and Rhythm: Normal rate  Pulmonary:      Effort: Pulmonary effort is normal       Breath sounds: No wheezing  Abdominal:      General: There is no distension  Palpations: Abdomen is soft  Musculoskeletal:      Right lower leg: Edema present  Comments: Hinged knee brace in place - improved swelling in knee  Splint LUE in place - fingers with good mobility   Skin:     General: Skin is warm  Neurological:      Mental Status: She is alert and oriented to person, place, and time  Psychiatric:         Mood and Affect: Mood normal        Condition at Discharge: stable     Discharge instructions/Information to patient and family:   See after visit summary for information provided to patient and family  Provisions for Follow-Up Care:  See after visit summary for information related to follow-up care and any pertinent home health orders  Future Appointments   Date Time Provider Kristy Newell   5/11/2021  3:00 PM Steff Horton MD ORTHO [facility]       Disposition: Home      Planned Readmission: No    Discharge Statement   I spent more than 30 minutes discharging the patient  This time was spent on the day of discharge  I had direct contact with the patient on the day of discharge  Greater than 50% of the total time was spent examining patient, answering all patient questions, arranging and discussing plan of care with patient as well as directly providing post-discharge instructions  Additional time then spent on discharge activities  Discharge Medications:  See after visit summary for reconciled discharge medications provided to patient and family

## 2021-04-22 NOTE — OCCUPATIONAL THERAPY NOTE
OT DISCHARGE SUMMARY    Pt made good progress during stay on the ARC following admission for tibial plateau fx  Pt presented upon IE with generalized weakness, decreased endurance, activity tolerance, and functional mobility  On evaluation, pt required  Nuria/modA to complete all ADLs and functional transfers  Pt was discharged to home with dtr  Pt currently functioning at S level for ADL, Cole level for transfers with PRW  The following DME was recommended BSC, GB, PRW, LHAE  FT not req during [facility] stay   OT recommended pt continue tor receive home OT services       -Samantha Brasher MS, OTR/L

## 2021-04-26 ENCOUNTER — TELEPHONE (OUTPATIENT)
Dept: NEUROLOGY | Facility: CLINIC | Age: 56
End: 2021-04-26

## 2021-05-11 ENCOUNTER — APPOINTMENT (OUTPATIENT)
Dept: RADIOLOGY | Facility: MEDICAL CENTER | Age: 56
End: 2021-05-11
Payer: COMMERCIAL

## 2021-05-11 ENCOUNTER — OFFICE VISIT (OUTPATIENT)
Dept: OBGYN CLINIC | Facility: MEDICAL CENTER | Age: 56
End: 2021-05-11
Payer: COMMERCIAL

## 2021-05-11 VITALS
HEART RATE: 80 BPM | WEIGHT: 224 LBS | HEIGHT: 65 IN | BODY MASS INDEX: 37.32 KG/M2 | DIASTOLIC BLOOD PRESSURE: 72 MMHG | SYSTOLIC BLOOD PRESSURE: 104 MMHG

## 2021-05-11 DIAGNOSIS — S82.121A CLOSED FRACTURE OF LATERAL PORTION OF RIGHT TIBIAL PLATEAU, INITIAL ENCOUNTER: ICD-10-CM

## 2021-05-11 DIAGNOSIS — S52.502A CLOSED FRACTURE OF DISTAL END OF LEFT RADIUS, UNSPECIFIED FRACTURE MORPHOLOGY, INITIAL ENCOUNTER: ICD-10-CM

## 2021-05-11 DIAGNOSIS — M25.532 PAIN IN LEFT WRIST: ICD-10-CM

## 2021-05-11 DIAGNOSIS — S82.121A CLOSED FRACTURE OF LATERAL PORTION OF RIGHT TIBIAL PLATEAU, INITIAL ENCOUNTER: Primary | ICD-10-CM

## 2021-05-11 PROCEDURE — 99213 OFFICE O/P EST LOW 20 MIN: CPT | Performed by: ORTHOPAEDIC SURGERY

## 2021-05-11 PROCEDURE — 73110 X-RAY EXAM OF WRIST: CPT

## 2021-05-11 PROCEDURE — 73564 X-RAY EXAM KNEE 4 OR MORE: CPT

## 2021-05-11 NOTE — PROGRESS NOTES
Orthopaedic Surgery - Office Note  Rosalinda Sandhoff (81 y o  female)   : 1965   MRN: [id number]  Encounter Date: 2021    Chief Complaint   Patient presents with    Right Lower Leg - Follow-up       Assessment / Plan  Left non-displaced distal radius fracture DOI: 2021  Right tibial plateau fracture DOI: 2021    · WBAT on RLE and LUE  · Cock up splint given today in office  · D/c TROM  · PT script given today to begin for RLE  · She will remain out of work at this time due to having be able to walk at work  Return in about 4 weeks (around 2021)  History of Present Illness  Buck Campos is a 63 y o  female who presents for follow up of left distal radius and right tibial plateau fracture sustained on 2021  She has remained compliant with NWB of both LUE and RLE  She has been in a short arm cast with no complaints since her prior visit  She feels that she is improving and offers no complaints at this time  She denies any radiating symptoms  Review of Systems  Pertinent items are noted in HPI  All other systems were reviewed and are negative  Physical Exam  /72   Pulse 80   Ht 5' 5" (1 651 m)   Wt 102 kg (224 lb)   LMP 10/24/2016   BMI 37 28 kg/m²   Cons: Appears well  No apparent distress  Psych: Alert  Oriented x3  Mood and affect normal   Eyes: PERRLA, EOMI  Resp: Normal effort  No audible wheezing or stridor  CV: Palpable pulse  No discernable arrhythmia  No LE edema  Lymph:  No palpable cervical, axillary, or inguinal lymphadenopathy  Skin: Warm  No palpable masses  No visible lesions  Neuro: Normal muscle tone  Normal and symmetric DTR's  Left Hand & Wrist Exam  Alignment:  Normal resting hand posture  Inspection:  No swelling  No erythema  No ecchymosis  Palpation:  No tenderness  No effusion  ROM:  Wrist Flexion 60  Pronation 60  Supination 60  Strength:  5/5  and pinch  Stability:  Not tested    Tests:  No pertinent positive or negative tests   Neurovascular:  Sensation intact in Ax/R/M/U nerve distributions  2+ radial pulse  Right Knee Exam  Alignment:  Normal knee alignment  Inspection:  No swelling  No erythema  No ecchymosis  Palpation:  No tenderness  No effusion  ROM:  Knee Extension 0  Knee Flexion 90  Strength:  Not tested  Stability:  Not tested  Tests:  not tested  Patella:  Not tested  Neurovascular:  Sensation intact in DP/SP/Cochran/Sa/T nerve distributions  2+ DP & PT pulses  Gait:  patient in wheelchair    Studies Reviewed  I have personally reviewed pertinent films in PACS  XR of right tibia - images from 5/11/2021 which demostrate healing over fracture site  XR of left wrist - images from 5/11/2021 which demostrate healing over fracture site    Procedures  No procedures today  Medical, Surgical, Family, and Social History  The patient's medical history, family history, and social history, were reviewed and updated as appropriate  Past Medical History:   Diagnosis Date    Disease of thyroid gland        Past Surgical History:   Procedure Laterality Date    GASTRIC BYPASS      HERNIA REPAIR         History reviewed  No pertinent family history      Social History     Occupational History    Not on file   Tobacco Use    Smoking status: Never Smoker    Smokeless tobacco: Never Used   Substance and Sexual Activity    Alcohol use: Not Currently     Frequency: Never     Drinks per session: Patient refused     Binge frequency: Never    Drug use: No    Sexual activity: Not Currently       No Known Allergies      Current Outpatient Medications:     acetaminophen (TYLENOL) 325 mg tablet, Take 2 tablets (650 mg total) by mouth every 6 (six) hours as needed for mild pain, Disp: , Rfl: 0    enoxaparin (LOVENOX) 40 mg/0 4 mL, Inject 0 4 mL (40 mg total) under the skin every 24 hours, Disp: 14 Syringe, Rfl: 0    ergocalciferol (VITAMIN D2) 50,000 units, Take 1 capsule (50,000 Units total) by mouth once a week, Disp: 4 capsule, Rfl: 0    levothyroxine 175 mcg tablet, Take 1 tablet (175 mcg total) by mouth daily in the early morning, Disp: 30 tablet, Rfl: 0    omeprazole (PriLOSEC) 20 mg delayed release capsule, Take 20 mg by mouth daily, Disp: , Rfl:     oxyCODONE (ROXICODONE) 5 mg immediate release tablet, Take 1/2 tablet (2 5mg) or 1 tablet (5mg) every 6 hours as needed for moderate - severe pain, Disp: 30 tablet, Rfl: 0    ascorbic acid (VITAMIN C) 500 MG tablet, Take 1 tablet (500 mg total) by mouth daily, Disp: , Rfl: 0    calcium carbonate (TUMS) 500 mg chewable tablet, Chew 2 tablets (1,000 mg total) daily as needed for indigestion or heartburn, Disp: , Rfl: 0    ferrous sulfate 325 (65 Fe) mg tablet, Take 1 tablet (325 mg total) by mouth daily with breakfast, Disp: , Rfl: 0      [employer]    I,:  Amari Garcia am acting as a scribe while in the presence of the attending physician :       I,:  Jp Hutchinson MD personally performed the services described in this documentation    as scribed in my presence :

## 2021-05-12 ENCOUNTER — TELEPHONE (OUTPATIENT)
Dept: OBGYN CLINIC | Facility: MEDICAL CENTER | Age: 56
End: 2021-05-12

## 2021-05-12 NOTE — TELEPHONE ENCOUNTER
Patient sees Dr Reji Wu  April at [facility] requesting the script for Physical Therapy (# [id number])  be faxed to [phone]

## 2021-06-04 ENCOUNTER — TELEPHONE (OUTPATIENT)
Dept: OBGYN CLINIC | Facility: HOSPITAL | Age: 56
End: 2021-06-04

## 2021-06-04 DIAGNOSIS — S52.502A CLOSED FRACTURE OF DISTAL END OF LEFT RADIUS, UNSPECIFIED FRACTURE MORPHOLOGY, INITIAL ENCOUNTER: Primary | ICD-10-CM

## 2021-06-04 NOTE — TELEPHONE ENCOUNTER
PT script for the left wrist was put in the system    Please obtain POC paperwork so we sign and send it back to  The rehab

## 2021-06-04 NOTE — TELEPHONE ENCOUNTER
Patient needs the POC care signed and faxed to [facility] at [phone]   Thank you

## 2021-06-04 NOTE — TELEPHONE ENCOUNTER
Patient is calling to request an order for PT for the left wrist   She goes to [facility]  Please advise      Cris [phone]

## 2021-06-18 ENCOUNTER — TELEPHONE (OUTPATIENT)
Dept: OBGYN CLINIC | Facility: HOSPITAL | Age: 56
End: 2021-06-18

## 2021-06-18 ENCOUNTER — OFFICE VISIT (OUTPATIENT)
Dept: OBGYN CLINIC | Facility: MEDICAL CENTER | Age: 56
End: 2021-06-18
Payer: COMMERCIAL

## 2021-06-18 ENCOUNTER — APPOINTMENT (OUTPATIENT)
Dept: RADIOLOGY | Facility: MEDICAL CENTER | Age: 56
End: 2021-06-18
Payer: COMMERCIAL

## 2021-06-18 VITALS
DIASTOLIC BLOOD PRESSURE: 68 MMHG | WEIGHT: 220 LBS | SYSTOLIC BLOOD PRESSURE: 112 MMHG | HEIGHT: 65 IN | RESPIRATION RATE: 18 BRPM | HEART RATE: 64 BPM | BODY MASS INDEX: 36.65 KG/M2

## 2021-06-18 DIAGNOSIS — S82.121A CLOSED FRACTURE OF LATERAL PORTION OF RIGHT TIBIAL PLATEAU, INITIAL ENCOUNTER: Primary | ICD-10-CM

## 2021-06-18 DIAGNOSIS — S52.502A CLOSED FRACTURE OF DISTAL END OF LEFT RADIUS, UNSPECIFIED FRACTURE MORPHOLOGY, INITIAL ENCOUNTER: ICD-10-CM

## 2021-06-18 DIAGNOSIS — S82.121A CLOSED FRACTURE OF LATERAL PORTION OF RIGHT TIBIAL PLATEAU, INITIAL ENCOUNTER: ICD-10-CM

## 2021-06-18 PROCEDURE — 73564 X-RAY EXAM KNEE 4 OR MORE: CPT

## 2021-06-18 PROCEDURE — 99214 OFFICE O/P EST MOD 30 MIN: CPT | Performed by: ORTHOPAEDIC SURGERY

## 2021-06-18 NOTE — PROGRESS NOTES
Orthopaedic Surgery - Office Note  Faviola Hickman (21 y o  female)   : 1965   MRN: [id number]  Encounter Date: 2021    Chief Complaint   Patient presents with    Right Lower Leg - Follow-up       Assessment / Plan  Left non-displaced distal radius fracture DOI: 2021  Right tibial plateau fracture DOI: 2021    · Continue with PT  · The pain she is feeling in her knee is likely form an increase in activity and should continue to resolve  · Continue to focus on stretching her knee to obtain full extension  · She can take 600 mg of ibf every 8 hours prn for pain and can supplement with Tylenol   · Short hinge knee brace given today in office  · If her pain continues or worsens we can consider an MRI   Return in about 6 weeks (around 2021)  History of Present Illness  Verónica Hood is a 63 y o  female who presents for follow up Left non-displaced distal radius fracture DOI: 2021 and right tibial plateau fracture DOI: 2021  She has transitioned to walking with a cane for the past 3 weeks which has been going well  She does note continued pain in her right knee  She has been attending PT for both her left wrist and right knee which she feels is going well  She denies any recent pain or discomfort in her left wrist  She denies any radiating symptoms  Review of Systems  Pertinent items are noted in HPI  All other systems were reviewed and are negative  Physical Exam  /68   Pulse 64   Resp 18   Ht 5' 5" (1 651 m)   Wt 99 8 kg (220 lb)   LMP 10/24/2016   BMI 36 61 kg/m²   Cons: Appears well  No apparent distress  Psych: Alert  Oriented x3  Mood and affect normal   Eyes: PERRLA, EOMI  Resp: Normal effort  No audible wheezing or stridor  CV: Palpable pulse  No discernable arrhythmia  No LE edema  Lymph:  No palpable cervical, axillary, or inguinal lymphadenopathy  Skin: Warm  No palpable masses  No visible lesions  Neuro: Normal muscle tone    Normal and symmetric DTR's      Right Knee Exam  Alignment:  Normal knee alignment  Inspection:  mild swelling  No erythema  No ecchymosis  Palpation:  mild medial joint line tenderness  No effusion  ROM:  Knee Extension 10  Knee Flexion 115  Strength:  Able to actively extend knee against gravity  Stability:  No objective knee instability  Stable Varus / Valgus stress, Lachman, and Posterior drawer  Tests:  (-) Donna  Patella:  Normal patellar mobility  Neurovascular:  Sensation intact in DP/SP/Cochran/Sa/T nerve distributions  2+ DP & PT pulses  Gait:  Steady with cane  Left Hand & Wrist Exam  Alignment:  Normal resting hand posture  Inspection:  No swelling  No erythema  No ecchymosis  Palpation:  No tenderness  No effusion  ROM:  Wrist Extension 60  Wrist Flexion 70  Pronation 70  Supination 60  Strength:  5/5 wrist flexors and wrist extensors  5/5  and pinch  Stability:  No objective hand or wrist instability  Tests:  No pertinent positive or negative tests  Neurovascular:  Sensation intact in Ax/R/M/U nerve distributions  2+ radial pulse  Studies Reviewed  I have personally reviewed pertinent films in PACS  XR of right tibia - images from 6/18/2021 which demostrate healing over fracture site  XR of left wrist - images from 5/11/2021 which demostrate healing over fracture site    Procedures  No procedures today  Medical, Surgical, Family, and Social History  The patient's medical history, family history, and social history, were reviewed and updated as appropriate  Past Medical History:   Diagnosis Date    Disease of thyroid gland        Past Surgical History:   Procedure Laterality Date    GASTRIC BYPASS      HERNIA REPAIR         History reviewed  No pertinent family history      Social History     Occupational History    Not on file   Tobacco Use    Smoking status: Never Smoker    Smokeless tobacco: Never Used   Vaping Use    Vaping Use: Never assessed   Substance and Sexual Activity    Alcohol use: Not Currently    Drug use: No    Sexual activity: Not Currently       No Known Allergies      Current Outpatient Medications:     acetaminophen (TYLENOL) 325 mg tablet, Take 2 tablets (650 mg total) by mouth every 6 (six) hours as needed for mild pain, Disp: , Rfl: 0    ergocalciferol (VITAMIN D2) 50,000 units, Take 1 capsule (50,000 Units total) by mouth once a week, Disp: 4 capsule, Rfl: 0    levothyroxine 175 mcg tablet, Take 1 tablet (175 mcg total) by mouth daily in the early morning, Disp: 30 tablet, Rfl: 0    omeprazole (PriLOSEC) 20 mg delayed release capsule, Take 20 mg by mouth daily, Disp: , Rfl:     ascorbic acid (VITAMIN C) 500 MG tablet, Take 1 tablet (500 mg total) by mouth daily, Disp: , Rfl: 0    calcium carbonate (TUMS) 500 mg chewable tablet, Chew 2 tablets (1,000 mg total) daily as needed for indigestion or heartburn, Disp: , Rfl: 0    enoxaparin (LOVENOX) 40 mg/0 4 mL, Inject 0 4 mL (40 mg total) under the skin every 24 hours, Disp: 14 Syringe, Rfl: 0    ferrous sulfate 325 (65 Fe) mg tablet, Take 1 tablet (325 mg total) by mouth daily with breakfast, Disp: , Rfl: 0    oxyCODONE (ROXICODONE) 5 mg immediate release tablet, Take 1/2 tablet (2 5mg) or 1 tablet (5mg) every 6 hours as needed for moderate - severe pain, Disp: 30 tablet, Rfl: 0      [employer]    I,:  Burak Fermin am acting as a scribe while in the presence of the attending physician :       I,:  Molina Ibarra MD personally performed the services described in this documentation    as scribed in my presence :

## 2021-06-18 NOTE — LETTER
June 18, 2021     Patient: Kt Schultz   YOB: 1965   Date of Visit: 6/18/2021       To Whom it May Concern:    Kt Schultz is under my professional care  She was seen in my office on 6/18/2021  She may return to work on 6/21/2021 to her sedentary and sitting work but should remain out of work that requires standing  We will reevaluate this at her next visit  If you have any questions or concerns, please don't hesitate to call           Sincerely,          Pippa Laboy MD        CC: No Recipients

## 2021-06-18 NOTE — TELEPHONE ENCOUNTER
Patient sees Dr Macias Settler  Lei Beck is calling in from [facility] leave of abscence department calling in relating the work note they received on this patient, and is asking when this patient is supposed to be seen next in the office

## 2021-06-23 ENCOUNTER — TELEPHONE (OUTPATIENT)
Dept: OBGYN CLINIC | Facility: HOSPITAL | Age: 56
End: 2021-06-23

## 2021-06-23 NOTE — TELEPHONE ENCOUNTER
Patient called about [place] paperwork, states she needs by Monday, received 6/23 advise 10-14 business days

## 2021-06-24 ENCOUNTER — TELEPHONE (OUTPATIENT)
Dept: OBGYN CLINIC | Facility: HOSPITAL | Age: 56
End: 2021-06-24

## 2021-06-24 NOTE — TELEPHONE ENCOUNTER
Dr Anabelle Bhagat    [phone]    Patient states that she is having 5/10 pain in her right knee  She has taken Ibuprofen, which helps some  She would like to know if a cortisone injection would help her pain? Please advise

## 2021-06-24 NOTE — TELEPHONE ENCOUNTER
Spoke with the patient and stated that Dr Laura Corey would hold off from steroid injections for at least 3 months afterwards and then considerate depending on progress

## 2021-07-02 ENCOUNTER — TELEPHONE (OUTPATIENT)
Dept: OBGYN CLINIC | Facility: HOSPITAL | Age: 56
End: 2021-07-02

## 2021-07-02 NOTE — TELEPHONE ENCOUNTER
Patient called about her disability paperwork,  Adv of [employer]    She said this paperwork is due Monday 7/5 and is asking for it to be expedited  Adv no guarantees        # [phone]

## 2021-07-09 ENCOUNTER — TELEPHONE (OUTPATIENT)
Dept: OBGYN CLINIC | Facility: HOSPITAL | Age: 56
End: 2021-07-09

## 2021-07-09 DIAGNOSIS — S52.601A CLOSED FRACTURE DISTAL RADIUS AND ULNA, RIGHT, INITIAL ENCOUNTER: Primary | ICD-10-CM

## 2021-07-09 DIAGNOSIS — S52.501A CLOSED FRACTURE DISTAL RADIUS AND ULNA, RIGHT, INITIAL ENCOUNTER: Primary | ICD-10-CM

## 2021-07-09 DIAGNOSIS — S82.131A CLOSED FRACTURE OF MEDIAL PORTION OF RIGHT TIBIAL PLATEAU, INITIAL ENCOUNTER: ICD-10-CM

## 2021-07-09 NOTE — TELEPHONE ENCOUNTER
Farncis Singer at [facility]  Ph [phone]  fx [phone]    Needing updated RX for PT RT knee/Lft wrist

## 2021-07-27 ENCOUNTER — OFFICE VISIT (OUTPATIENT)
Dept: OBGYN CLINIC | Facility: MEDICAL CENTER | Age: 56
End: 2021-07-27
Payer: COMMERCIAL

## 2021-07-27 VITALS
HEART RATE: 90 BPM | HEIGHT: 65 IN | SYSTOLIC BLOOD PRESSURE: 118 MMHG | BODY MASS INDEX: 36.65 KG/M2 | WEIGHT: 220 LBS | DIASTOLIC BLOOD PRESSURE: 74 MMHG

## 2021-07-27 DIAGNOSIS — S52.601A CLOSED FRACTURE DISTAL RADIUS AND ULNA, RIGHT, INITIAL ENCOUNTER: Primary | ICD-10-CM

## 2021-07-27 DIAGNOSIS — S82.131A CLOSED FRACTURE OF MEDIAL PORTION OF RIGHT TIBIAL PLATEAU, INITIAL ENCOUNTER: ICD-10-CM

## 2021-07-27 DIAGNOSIS — S52.501A CLOSED FRACTURE DISTAL RADIUS AND ULNA, RIGHT, INITIAL ENCOUNTER: Primary | ICD-10-CM

## 2021-07-27 PROCEDURE — 99213 OFFICE O/P EST LOW 20 MIN: CPT | Performed by: ORTHOPAEDIC SURGERY

## 2021-07-27 NOTE — LETTER
July 27, 2021     Patient: Lisa Pacheco   YOB: 1965   Date of Visit: 7/27/2021       To Whom it May Concern:    Lisa Pacheco is under my professional care  She was seen in my office on 7/27/2021  She may return to work on 7/28/2021  If you have any questions or concerns, please don't hesitate to call           Sincerely,          Reyes Lyons MD        CC: No Recipients

## 2021-07-27 NOTE — PROGRESS NOTES
Orthopaedic Surgery - Office Note  Lisa Pacheco (47 y o  female)   : 1965   MRN: [id number]  Encounter Date: 2021    Chief Complaint   Patient presents with    Right Lower Leg - Follow-up       Assessment / Plan  Left non-displaced distal radius fracture, good progression  Right tibial plateau fracture, good progression      · Activity as tolerated   · Continue with outpatient PT  · Patient inquired about a CSI in her knee but she understands that one is not recommended at this time   · Work note given stating she is cleared to return to work  Return in about 2 months (around 2021)  History of Present Illness  Nuris Mao is a 63 y o  female who presents for follow up left non-displaced distal radius fracture DOI: 2021 and right tibial plateau fracture DOI: 2021  She has been walking unassisted for 3 weeks  She does walk with an analgetic gait  She has continued to attend PT for both her wrist and knee which she feels is going well  She does note continued discomfort along the medial knee with ambulating  She has d/c the use of the short hinge knee brace  Overall, she is happy with how she has progressed  Review of Systems  Pertinent items are noted in HPI  All other systems were reviewed and are negative  Physical Exam  /74   Pulse 90   Ht 5' 5" (1 651 m)   Wt 99 8 kg (220 lb)   LMP 10/24/2016   BMI 36 61 kg/m²   Cons: Appears well  No apparent distress  Psych: Alert  Oriented x3  Mood and affect normal   Eyes: PERRLA, EOMI  Resp: Normal effort  No audible wheezing or stridor  CV: Palpable pulse  No discernable arrhythmia  No LE edema  Lymph:  No palpable cervical, axillary, or inguinal lymphadenopathy  Skin: Warm  No palpable masses  No visible lesions  Neuro: Normal muscle tone  Normal and symmetric DTR's  Right Knee Exam  Alignment:  Normal knee alignment  Inspection:  No swelling  No erythema  No ecchymosis  Palpation:  No tenderness   No effusion  ROM:  Knee Extension 0  Knee Flexion 130  Strength:  5/5 quadriceps and hamstrings  Stability:  No objective knee instability  Stable Varus / Valgus stress, Lachman, and Posterior drawer  Tests:  No pertinent positive or negative tests  Patella:  Patella tracks centrally without crepitus  Neurovascular:  Sensation intact in DP/SP/Cochran/Sa/T nerve distributions  2+ DP & PT pulses  Gait:  Antalgic  Left Hand & Wrist Exam  Alignment:  Normal resting hand posture  Inspection:  No swelling  No erythema  No ecchymosis  Palpation:  No tenderness  No effusion  ROM:  Wrist Extension 80  Wrist Flexion 70  Pronation 70  Supination 70  Strength:  5/5  and pinch  Stability:  No objective hand or wrist instability  Tests:  No pertinent positive or negative tests  Neurovascular:  Sensation intact in Ax/R/M/U nerve distributions  2+ radial pulse  Studies Reviewed  I have personally reviewed pertinent films in PACS  XR of right tibia - images from 6/18/2021 which demostrate healing over fracture site  XR of left wrist - images from 5/11/2021 which demostrate healing over fracture site    Procedures  No procedures today  Medical, Surgical, Family, and Social History  The patient's medical history, family history, and social history, were reviewed and updated as appropriate  Past Medical History:   Diagnosis Date    Disease of thyroid gland        Past Surgical History:   Procedure Laterality Date    GASTRIC BYPASS      HERNIA REPAIR         History reviewed  No pertinent family history      Social History     Occupational History    Not on file   Tobacco Use    Smoking status: Never Smoker    Smokeless tobacco: Never Used   Vaping Use    Vaping Use: Never assessed   Substance and Sexual Activity    Alcohol use: Not Currently    Drug use: No    Sexual activity: Not Currently       No Known Allergies      Current Outpatient Medications:     acetaminophen (TYLENOL) 325 mg tablet, Take 2 tablets (650 mg total) by mouth every 6 (six) hours as needed for mild pain, Disp: , Rfl: 0    ergocalciferol (VITAMIN D2) 50,000 units, Take 1 capsule (50,000 Units total) by mouth once a week, Disp: 4 capsule, Rfl: 0    levothyroxine 175 mcg tablet, Take 1 tablet (175 mcg total) by mouth daily in the early morning, Disp: 30 tablet, Rfl: 0    omeprazole (PriLOSEC) 20 mg delayed release capsule, Take 20 mg by mouth daily, Disp: , Rfl:     ascorbic acid (VITAMIN C) 500 MG tablet, Take 1 tablet (500 mg total) by mouth daily, Disp: , Rfl: 0    calcium carbonate (TUMS) 500 mg chewable tablet, Chew 2 tablets (1,000 mg total) daily as needed for indigestion or heartburn, Disp: , Rfl: 0    enoxaparin (LOVENOX) 40 mg/0 4 mL, Inject 0 4 mL (40 mg total) under the skin every 24 hours, Disp: 14 Syringe, Rfl: 0    ferrous sulfate 325 (65 Fe) mg tablet, Take 1 tablet (325 mg total) by mouth daily with breakfast, Disp: , Rfl: 0    oxyCODONE (ROXICODONE) 5 mg immediate release tablet, Take 1/2 tablet (2 5mg) or 1 tablet (5mg) every 6 hours as needed for moderate - severe pain, Disp: 30 tablet, Rfl: 0      [employer]    I,:  Severo Coker am acting as a scribe while in the presence of the attending physician :       I,:  Kevin Fernandez MD personally performed the services described in this documentation    as scribed in my presence :

## 2021-08-13 ENCOUNTER — TELEPHONE (OUTPATIENT)
Dept: OBGYN CLINIC | Facility: OTHER | Age: 56
End: 2021-08-13

## 2021-08-13 NOTE — TELEPHONE ENCOUNTER
Malgorzata Escobar @ [facility] is requesting a New Physical Therapy Script, for some reason theirs  on   Or if you can just fax the one  From  in our system      Fax # [phone]  Attn Aydee NEUMANN/fawad # [phone]

## 2021-08-16 NOTE — TELEPHONE ENCOUNTER
Kate calling back about the script, appointment is tomorrow at 5:30 pm    Judith @ [facility]  [address] is requesting a New Physical Therapy Script, for some reason theirs  on       Or if you can just fax the one  From  in our system      Fax # [phone]  Attn : Svetlana Alonso # [phone]

## 2021-09-28 ENCOUNTER — OFFICE VISIT (OUTPATIENT)
Dept: OBGYN CLINIC | Facility: MEDICAL CENTER | Age: 56
End: 2021-09-28
Payer: COMMERCIAL

## 2021-09-28 VITALS
SYSTOLIC BLOOD PRESSURE: 119 MMHG | DIASTOLIC BLOOD PRESSURE: 75 MMHG | WEIGHT: 230 LBS | HEART RATE: 73 BPM | BODY MASS INDEX: 38.27 KG/M2

## 2021-09-28 DIAGNOSIS — S52.601A CLOSED FRACTURE DISTAL RADIUS AND ULNA, RIGHT, INITIAL ENCOUNTER: ICD-10-CM

## 2021-09-28 DIAGNOSIS — M79.89 RIGHT LEG SWELLING: Primary | ICD-10-CM

## 2021-09-28 DIAGNOSIS — M79.89 LEFT LEG SWELLING: ICD-10-CM

## 2021-09-28 DIAGNOSIS — S52.501A CLOSED FRACTURE DISTAL RADIUS AND ULNA, RIGHT, INITIAL ENCOUNTER: ICD-10-CM

## 2021-09-28 PROCEDURE — 99213 OFFICE O/P EST LOW 20 MIN: CPT | Performed by: ORTHOPAEDIC SURGERY

## 2021-09-28 NOTE — PROGRESS NOTES
Orthopaedic Surgery - Office Note  David Medel (42 y o  female)   : 1965   MRN: [id number]  Encounter Date: 2021    No chief complaint on file  Assessment / Plan  Left non-displaced distal radius fracture, good progression  Right tibial plateau fracture, good progression     · Activity as tolerated  · Prescription for 15-20 mm mercury knee-high compression stocking for bilateral lower extremity edema was given at today's visit  · Continue outpatient PT  · Recommended consulting PCP about LE edema  No follow-ups on file  History of Present Illness  Cristin Gallo is a 61 y o  female who presents for follow-up evaluation of left nondisplaced distal radius fracture and right tibial plateau fracture with the date of injury 2021  She continues to have no issues with her wrist and says that she has done little physical therapy for that  She states that she also continues formal physical therapy for her right knee, which helps and she is progressing well  She states that walking is better however bending down still bothers her  She has noticed an increased swelling in her knees  She does mention that she had injury last weekend when she fell down the stairs, however she went to urgent care and there were no issues or complications  She also mentions that she has been experiencing by medial pain on both her ankles recently got a 2nd opinion who is treating her for bilateral plantar fasciitis  She denies any distal paresthesias  Review of Systems  Pertinent items are noted in HPI  All other systems were reviewed and are negative  Physical Exam  /75   Pulse 73   Wt 104 kg (230 lb)   LMP 10/24/2016   BMI 38 27 kg/m²   Cons: Appears well  No apparent distress  Psych: Alert  Oriented x3  Mood and affect normal   Eyes: PERRLA, EOMI  Resp: Normal effort  No audible wheezing or stridor  CV: Palpable pulse  No discernable arrhythmia  3+ pitting LE edema    Lymph:  No palpable cervical, axillary, or inguinal lymphadenopathy  Skin: Warm  No palpable masses  No visible lesions  Neuro: Normal muscle tone  Normal and symmetric DTR's  Right Knee Exam  Alignment:  Normal knee alignment  Inspection:  Moderate swelling  Palpation:  No tenderness  No effusion  ROM:  Knee Extension 0  Knee Flexion 120  Strength:  Able to actively extend knee against gravity  Stability:  No objective knee instability  Stable Varus / Valgus stress, Lachman, and Posterior drawer  Tests:  No pertinent positive or negative tests  Patella:  Not tested  Normal patellar mobility  Neurovascular:  Sensation intact in DP/SP/Cochran/Sa/T nerve distributions  2+ DP & PT pulses  Gait:  Normal      Left Hand & Wrist Exam  Alignment:  Normal resting hand posture  Inspection:  No swelling  No erythema  No ecchymosis  Palpation:  No tenderness  No effusion  ROM:  Wrist Extension 80  Wrist Flexion 70  Pronation 70  Supination 70  Strength:  5/5  and pinch  Stability:  No objective hand or wrist instability  Tests:  No pertinent positive or negative tests  Neurovascular:  Sensation intact in Ax/R/M/U nerve distributions  2+ radial pulse  Studies Reviewed  No studies to review    Procedures  No procedures today  Medical, Surgical, Family, and Social History  The patient's medical history, family history, and social history, were reviewed and updated as appropriate  Past Medical History:   Diagnosis Date    Disease of thyroid gland        Past Surgical History:   Procedure Laterality Date    GASTRIC BYPASS      HERNIA REPAIR         No family history on file      Social History     Occupational History    Not on file   Tobacco Use    Smoking status: Never Smoker    Smokeless tobacco: Never Used   Vaping Use    Vaping Use: Never assessed   Substance and Sexual Activity    Alcohol use: Not Currently    Drug use: No    Sexual activity: Not Currently       No Known Allergies      Current Outpatient Medications:     acetaminophen (TYLENOL) 325 mg tablet, Take 2 tablets (650 mg total) by mouth every 6 (six) hours as needed for mild pain, Disp: , Rfl: 0    ascorbic acid (VITAMIN C) 500 MG tablet, Take 1 tablet (500 mg total) by mouth daily, Disp: , Rfl: 0    calcium carbonate (TUMS) 500 mg chewable tablet, Chew 2 tablets (1,000 mg total) daily as needed for indigestion or heartburn, Disp: , Rfl: 0    enoxaparin (LOVENOX) 40 mg/0 4 mL, Inject 0 4 mL (40 mg total) under the skin every 24 hours, Disp: 14 Syringe, Rfl: 0    ergocalciferol (VITAMIN D2) 50,000 units, Take 1 capsule (50,000 Units total) by mouth once a week, Disp: 4 capsule, Rfl: 0    ferrous sulfate 325 (65 Fe) mg tablet, Take 1 tablet (325 mg total) by mouth daily with breakfast, Disp: , Rfl: 0    levothyroxine 175 mcg tablet, Take 1 tablet (175 mcg total) by mouth daily in the early morning, Disp: 30 tablet, Rfl: 0    omeprazole (PriLOSEC) 20 mg delayed release capsule, Take 20 mg by mouth daily, Disp: , Rfl:     oxyCODONE (ROXICODONE) 5 mg immediate release tablet, Take 1/2 tablet (2 5mg) or 1 tablet (5mg) every 6 hours as needed for moderate - severe pain, Disp: 30 tablet, Rfl: 0      Dimas Hicks    Scribe Attestation    I,:  Demetrius Velasquez am acting as a scribe while in the presence of the attending physician :       I,:  Reyes Lyons MD personally performed the services described in this documentation    as scribed in my presence :

## 2022-07-13 ENCOUNTER — APPOINTMENT (OUTPATIENT)
Dept: RADIOLOGY | Facility: MEDICAL CENTER | Age: 57
End: 2022-07-13
Payer: COMMERCIAL

## 2022-07-13 ENCOUNTER — OFFICE VISIT (OUTPATIENT)
Dept: URGENT CARE | Facility: MEDICAL CENTER | Age: 57
End: 2022-07-13
Payer: COMMERCIAL

## 2022-07-13 VITALS
DIASTOLIC BLOOD PRESSURE: 57 MMHG | SYSTOLIC BLOOD PRESSURE: 122 MMHG | HEART RATE: 70 BPM | TEMPERATURE: 98.7 F | RESPIRATION RATE: 18 BRPM | OXYGEN SATURATION: 98 %

## 2022-07-13 DIAGNOSIS — S92.514A CLOSED NONDISPLACED FRACTURE OF PROXIMAL PHALANX OF LESSER TOE OF RIGHT FOOT, INITIAL ENCOUNTER: Primary | ICD-10-CM

## 2022-07-13 DIAGNOSIS — S99.921A INJURY OF RIGHT FOOT, INITIAL ENCOUNTER: ICD-10-CM

## 2022-07-13 PROCEDURE — 99213 OFFICE O/P EST LOW 20 MIN: CPT | Performed by: EMERGENCY MEDICINE

## 2022-07-13 PROCEDURE — 73660 X-RAY EXAM OF TOE(S): CPT

## 2022-07-13 NOTE — PROGRESS NOTES
[facility]        NAME: Bertram Bond is a 61 y o  female  : 1965    MRN: [id number]  DATE: 2022  TIME: 2:20 PM    Assessment and Plan   Closed nondisplaced fracture of distal phalanx of lesser toe of right foot, initial encounter [W93 176G]  1  Closed nondisplaced fracture of distal phalanx of lesser toe of right foot, initial encounter  XR toe right fifth min 2 views     X-ray shows a nondisplaced fracture of the proximal phalanx of the 5th toe  Will buddy-tape and place an orthopedic shoe for comfort  Follow up with PCP as needed  Patient Instructions   Toe Fracture   WHAT YOU NEED TO KNOW:   A toe fracture is a break in a bone in your toe  DISCHARGE INSTRUCTIONS:   Return to the emergency department if:   · Blood soaks through your bandage  · You have severe pain in your toe  · Your toe is cold or numb  Call your doctor if:   · You have a fever  · Your pain does not go away, even after treatment  · Your toe continues to hurt even after it has healed  · You have questions or concerns about your condition or care  Medicines: You may need any of the following:  · NSAIDs , such as ibuprofen, help decrease swelling, pain, and fever  This medicine is available with or without a doctor's order  NSAIDs can cause stomach bleeding or kidney problems in certain people  If you take blood thinner medicine, always ask your healthcare provider if NSAIDs are safe for you  Always read the medicine label and follow directions  · Prescription pain medicine  may be given  Ask your healthcare provider how to take this medicine safely  Some prescription pain medicines contain acetaminophen  Do not take other medicines that contain acetaminophen without talking to your healthcare provider  Too much acetaminophen may cause liver damage  Prescription pain medicine may cause constipation  Ask your healthcare provider how to prevent or treat constipation       · Antibiotics treat a bacterial infection  You may need antibiotics if you have an open wound  · Take your medicine as directed  Contact your healthcare provider if you think your medicine is not helping or if you have side effects  Tell him of her if you are allergic to any medicine  Keep a list of the medicines, vitamins, and herbs you take  Include the amounts, and when and why you take them  Bring the list or the pill bottles to follow-up visits  Carry your medicine list with you in case of an emergency  Self-care:   · Rest  your toe so that it can heal  Return to normal activities as directed  · Apply ice  on your toe for 15 to 20 minutes every hour or as directed  Use an ice pack, or put crushed ice in a plastic bag  Cover it with a towel before you put it on your toe  Ice helps prevent tissue damage and decreases swelling and pain  · Elevate  your toe above the level of your heart as often as you can  This will help decrease swelling and pain  Prop your toe on pillows or blankets to keep it elevated comfortably  · Use ryley tape, an elastic bandage, or a splint  as directed  These help keep your toe in its correct position as it heals  Ryley tape means your fractured toe and the toe next to it are taped together  · Use a support device  such as a cane, crutches, walking boot, or hard soled shoe as directed  These help protect your toe and limit movement so it can heal        Follow up with your doctor as directed: You may need to return in 2 to 4 weeks  Write down your questions so you remember to ask them during your visits  © Copyright [employer] 2022 Information is for End User's use only and may not be sold, redistributed or otherwise used for commercial purposes  All illustrations and images included in CareNotes® are the copyrighted property of [employer]  or Anil Wilson   The above information is an  only   It is not intended as medical advice for individual conditions or treatments  Talk to your doctor, nurse or pharmacist before following any medical regimen to see if it is safe and effective for you  Follow up with PCP in 3-5 days  Proceed to  ER if symptoms worsen  Chief Complaint     Chief Complaint   Patient presents with    Foot Pain     Right foot and left thigh pain x yesterday when she stubbed foot on a highchair and then stumbled  History of Present Illness       48-year-old female presents today for evaluation of right 5th toe pain and left hip pain after an injury last night  She states she stubbed her toe on a high chair and stumbled forward  She states she did not fall  Pain in her left hip is worse with flexion  She is able to weight bear and ambulate  Denies other injury  She is not on any blood thinners  Review of Systems   Review of Systems   Constitutional: Negative for chills, fatigue and fever  HENT: Negative for postnasal drip, sore throat and trouble swallowing  Respiratory: Negative for chest tightness and shortness of breath  Gastrointestinal: Negative for abdominal pain  Genitourinary: Negative for dysuria  Musculoskeletal: Positive for arthralgias  Skin: Negative for rash  Allergic/Immunologic: Negative for immunocompromised state  Neurological: Negative for dizziness, light-headedness and headaches           Current Medications       Current Outpatient Medications:     acetaminophen (TYLENOL) 325 mg tablet, Take 2 tablets (650 mg total) by mouth every 6 (six) hours as needed for mild pain, Disp: , Rfl: 0    ergocalciferol (VITAMIN D2) 50,000 units, Take 1 capsule (50,000 Units total) by mouth once a week, Disp: 4 capsule, Rfl: 0    levothyroxine 175 mcg tablet, Take 1 tablet (175 mcg total) by mouth daily in the early morning, Disp: 30 tablet, Rfl: 0    omeprazole (PriLOSEC) 20 mg delayed release capsule, Take 20 mg by mouth daily, Disp: , Rfl:     ascorbic acid (VITAMIN C) 500 MG tablet, Take 1 tablet (500 mg total) by mouth daily, Disp: , Rfl: 0    calcium carbonate (TUMS) 500 mg chewable tablet, Chew 2 tablets (1,000 mg total) daily as needed for indigestion or heartburn, Disp: , Rfl: 0    enoxaparin (LOVENOX) 40 mg/0 4 mL, Inject 0 4 mL (40 mg total) under the skin every 24 hours, Disp: 14 Syringe, Rfl: 0    ferrous sulfate 325 (65 Fe) mg tablet, Take 1 tablet (325 mg total) by mouth daily with breakfast, Disp: , Rfl: 0    oxyCODONE (ROXICODONE) 5 mg immediate release tablet, Take 1/2 tablet (2 5mg) or 1 tablet (5mg) every 6 hours as needed for moderate - severe pain, Disp: 30 tablet, Rfl: 0    Current Allergies     Allergies as of 07/13/2022    (No Known Allergies)            The following portions of the patient's history were reviewed and updated as appropriate: allergies, current medications, past family history, past medical history, past social history, past surgical history and problem list      Past Medical History:   Diagnosis Date    Disease of thyroid gland        Past Surgical History:   Procedure Laterality Date    GASTRIC BYPASS      HERNIA REPAIR         No family history on file  Medications have been verified  Objective   /57   Pulse 70   Temp 98 7 °F (37 1 °C)   Resp 18   LMP 10/24/2016   SpO2 98%        Physical Exam     Physical Exam  Vitals and nursing note reviewed  Constitutional:       Appearance: Normal appearance  HENT:      Head: Normocephalic and atraumatic  Musculoskeletal:      Right hip: Normal       Left hip: Tenderness (IT band) present  No deformity, lacerations, bony tenderness or crepitus  Normal range of motion  Normal strength  Right foot: Bony tenderness (Fifth digit) present  Left foot: Normal    Skin:     General: Skin is warm and dry  Capillary Refill: Capillary refill takes less than 2 seconds  Neurological:      General: No focal deficit present        Mental Status: She is alert and oriented to person, place, and time     Psychiatric:         Mood and Affect: Mood normal          Behavior: Behavior normal

## 2022-07-13 NOTE — PATIENT INSTRUCTIONS
Toe Fracture   WHAT YOU NEED TO KNOW:   A toe fracture is a break in a bone in your toe  DISCHARGE INSTRUCTIONS:   Return to the emergency department if:   Blood soaks through your bandage  You have severe pain in your toe  Your toe is cold or numb  Call your doctor if:   You have a fever  Your pain does not go away, even after treatment  Your toe continues to hurt even after it has healed  You have questions or concerns about your condition or care  Medicines: You may need any of the following:  NSAIDs , such as ibuprofen, help decrease swelling, pain, and fever  This medicine is available with or without a doctor's order  NSAIDs can cause stomach bleeding or kidney problems in certain people  If you take blood thinner medicine, always ask your healthcare provider if NSAIDs are safe for you  Always read the medicine label and follow directions  Prescription pain medicine  may be given  Ask your healthcare provider how to take this medicine safely  Some prescription pain medicines contain acetaminophen  Do not take other medicines that contain acetaminophen without talking to your healthcare provider  Too much acetaminophen may cause liver damage  Prescription pain medicine may cause constipation  Ask your healthcare provider how to prevent or treat constipation  Antibiotics  treat a bacterial infection  You may need antibiotics if you have an open wound  Take your medicine as directed  Contact your healthcare provider if you think your medicine is not helping or if you have side effects  Tell him of her if you are allergic to any medicine  Keep a list of the medicines, vitamins, and herbs you take  Include the amounts, and when and why you take them  Bring the list or the pill bottles to follow-up visits  Carry your medicine list with you in case of an emergency  Self-care:   Rest  your toe so that it can heal  Return to normal activities as directed      Apply ice  on your toe for 15 to 20 minutes every hour or as directed  Use an ice pack, or put crushed ice in a plastic bag  Cover it with a towel before you put it on your toe  Ice helps prevent tissue damage and decreases swelling and pain  Elevate  your toe above the level of your heart as often as you can  This will help decrease swelling and pain  Prop your toe on pillows or blankets to keep it elevated comfortably  Use ryley tape, an elastic bandage, or a splint  as directed  These help keep your toe in its correct position as it heals  Ryley tape means your fractured toe and the toe next to it are taped together  Use a support device  such as a cane, crutches, walking boot, or hard soled shoe as directed  These help protect your toe and limit movement so it can heal        Follow up with your doctor as directed: You may need to return in 2 to 4 weeks  Write down your questions so you remember to ask them during your visits  © Copyright [employer] 2022 Information is for End User's use only and may not be sold, redistributed or otherwise used for commercial purposes  All illustrations and images included in CareNotes® are the copyrighted property of [employer]  or Anil Wilson   The above information is an  only  It is not intended as medical advice for individual conditions or treatments  Talk to your doctor, nurse or pharmacist before following any medical regimen to see if it is safe and effective for you
